# Patient Record
Sex: MALE | Race: WHITE | NOT HISPANIC OR LATINO | Employment: OTHER | URBAN - METROPOLITAN AREA
[De-identification: names, ages, dates, MRNs, and addresses within clinical notes are randomized per-mention and may not be internally consistent; named-entity substitution may affect disease eponyms.]

---

## 2017-01-17 ENCOUNTER — PATIENT MESSAGE (OUTPATIENT)
Dept: MEDICAL GROUP | Age: 67
End: 2017-01-17

## 2017-01-17 DIAGNOSIS — E66.9 OBESITY (BMI 35.0-39.9 WITHOUT COMORBIDITY): ICD-10-CM

## 2017-01-17 DIAGNOSIS — Z86.69 HISTORY OF OBSTRUCTIVE SLEEP APNEA: ICD-10-CM

## 2017-01-17 DIAGNOSIS — N40.1 BENIGN NODULAR PROSTATIC HYPERPLASIA WITH LOWER URINARY TRACT SYMPTOMS: ICD-10-CM

## 2017-01-17 DIAGNOSIS — E78.2 MIXED HYPERLIPIDEMIA: ICD-10-CM

## 2017-01-17 DIAGNOSIS — E61.1 IRON DEFICIENCY: ICD-10-CM

## 2017-01-17 DIAGNOSIS — E53.8 VITAMIN B 12 DEFICIENCY: ICD-10-CM

## 2017-01-17 NOTE — TELEPHONE ENCOUNTER
From: Jhony Rodriguez  To: Reinaldo Fenton M.D.  Sent: 1/17/2017 10:39 AM PST  Subject: Non-Urgent Medical Question    Hello Dr. Fenton,   I am still in the process of securing Long Term Care Insurance, and I am close to approval, but I would like your assistance concerning some final issues that I think are easily resolvable. I will leave an example letter for your review that I would like to have in my records file to allow completion of my LTC insurance. If you would like to discuss this with me, I will make an appointment. Thank you for your assistance. home number - [phone]. Jhony Rodriguez (Pete)

## 2017-01-18 ENCOUNTER — TELEPHONE (OUTPATIENT)
Dept: MEDICAL GROUP | Age: 67
End: 2017-01-18

## 2017-01-18 NOTE — TELEPHONE ENCOUNTER
----- Message from Hiral Jacob sent at 1/17/2017  2:19 PM PST -----  Pt came by to drop off a example letter that he needs Tameka Jennings to write on pt behalf. Patient ask if tameka Jennings can email said letter to him at [email or web address], or pt can  letter,also pt says if needs an appointment he is glad to come in. Please let him know what he needs to do. Put example letter in your basket..  Thank You,  Hiral

## 2017-01-18 NOTE — TELEPHONE ENCOUNTER
Needs appt. In order for me to do letrter. Also, Get and link last colonoscopy report- 2011 at [facility] I believe. Thanks

## 2017-01-18 NOTE — TELEPHONE ENCOUNTER
JORDYN ONLY      Phone Number Called: [phone]    Message: Pt scheduled for tomorrow.  records release sent to [facility].    Left Message for patient to call back: no

## 2017-01-18 NOTE — Clinical Note
[facility]  Reinaldo Fenton M.D.  [address]  [place] [address]  Fax: [phone] Authorization for Release/Disclosure of Protected Health Information   Name: JHONY RODRIGUEZ : 1950 SSN: [id number]   Address: [address] [place] [address] Phone:    [phone] (home) [phone] (work)   I authorize the entity listed below to release/disclose the PHI below to [facility]/Reinaldo Fenton M.D.   Provider or Entity Name:    [facility]   Address   City, State, [facility]   Phone:      Fax:  [phone]     Reason for request: continuity of care   Information to be released:    [x  ] LAST COLONOSCOPY, including any PATH REPORT [  ] LAST DEXA  [  ] LAST MAMMOGRAM  [  ] LAST PAP [  ] RETINA EXAM REPORT  [  ] IMMUNIZATION RECORDS  [  ] Release all info      [  ] Check here and initial the line next to each item to release ALL health information INCLUDING  _____ Care and treatment for drug and / or alcohol abuse  _____ HIV testing, infection status, or AIDS  _____ Genetic Testing    DATES OF SERVICE OR TIME PERIOD TO BE DISCLOSED: _____________  I understand and acknowledge that:  * This Authorization may be revoked at any time by you in writing, except if your health information has already been used or disclosed.  * Your health information that will be used or disclosed as a result of you signing this authorization could be re-disclosed by the recipient. If this occurs, your re-disclosed health information may no longer be protected by State or Federal laws.  * You may refuse to sign this Authorization. Your refusal will not affect your ability to obtain treatment.  * This Authorization becomes effective upon signing and will  on (date) __________. If no date is indicated, this Authorization will  one (1) year from the signature date.    Name: Jhony Rodriguez    Signature: ELECTRONICALLY SIGNED     Date: 2017

## 2017-01-18 NOTE — TELEPHONE ENCOUNTER
Please check chart every day and let me know when colonoscopy show up in [facility]- CALL [facility] DAILY IF NOT SHOWING UP

## 2017-01-19 ENCOUNTER — OFFICE VISIT (OUTPATIENT)
Dept: MEDICAL GROUP | Age: 67
End: 2017-01-19
Payer: COMMERCIAL

## 2017-01-19 VITALS
OXYGEN SATURATION: 99 % | SYSTOLIC BLOOD PRESSURE: 120 MMHG | HEART RATE: 66 BPM | TEMPERATURE: 98 F | HEIGHT: 77 IN | BODY MASS INDEX: 37.19 KG/M2 | DIASTOLIC BLOOD PRESSURE: 78 MMHG | WEIGHT: 315 LBS | RESPIRATION RATE: 16 BRPM

## 2017-01-19 DIAGNOSIS — E78.2 MIXED HYPERLIPIDEMIA: ICD-10-CM

## 2017-01-19 DIAGNOSIS — E61.1 IRON DEFICIENCY: ICD-10-CM

## 2017-01-19 DIAGNOSIS — Z98.84 GASTRIC BYPASS STATUS FOR OBESITY: ICD-10-CM

## 2017-01-19 DIAGNOSIS — G45.4 AMNESIA, GLOBAL, TRANSIENT: ICD-10-CM

## 2017-01-19 DIAGNOSIS — Z86.69 HISTORY OF OBSTRUCTIVE SLEEP APNEA: ICD-10-CM

## 2017-01-19 DIAGNOSIS — E53.8 VITAMIN B 12 DEFICIENCY: ICD-10-CM

## 2017-01-19 DIAGNOSIS — M15.9 PRIMARY OSTEOARTHRITIS INVOLVING MULTIPLE JOINTS: ICD-10-CM

## 2017-01-19 DIAGNOSIS — N40.1 BENIGN NODULAR PROSTATIC HYPERPLASIA WITH LOWER URINARY TRACT SYMPTOMS: ICD-10-CM

## 2017-01-19 DIAGNOSIS — Z86.79 S/P RF ABLATION OPERATION FOR ARRHYTHMIA: ICD-10-CM

## 2017-01-19 DIAGNOSIS — Z98.890 S/P RF ABLATION OPERATION FOR ARRHYTHMIA: ICD-10-CM

## 2017-01-19 DIAGNOSIS — H83.3X1: ICD-10-CM

## 2017-01-19 PROCEDURE — 99215 OFFICE O/P EST HI 40 MIN: CPT | Performed by: INTERNAL MEDICINE

## 2017-01-19 NOTE — Clinical Note
January 20, 2017        Jhony Rodriguez  [address] [place] [address]        Dear Jhony  and underwriting Department at [facility], regarding application number [phone]:    This patient requests a letter on his behalf documenting the resolution of following past medical problems:    Past medical problems which have resolved are as follows:    #1. history of atrial fibrillation in 2008 which resolved with radiofrequency ablation with no recurrence since then. He has had repeat EKGs which have remained normal and he has been discharged from the cardiologist's care for the past several years. He's had no recurrences. Anticipate no further problems from this. He denied any symptoms referable to a cardiac arrhythmia such as syncope or palpitations or chest pains.    #2. History of transient global amnesia in 2012, which resolved spontaneously in 3 days without any recurrence.  As previously documented patient had an extensive neurological workup with neurology consultation and negative or normal CT scanning of the head, EEG, carotid ultrasound, and echocardiogram. He has remained stable since that time and under my care without any recurrence of cognitive or memory impairment syncope. See his paresis or other neurological symptoms. This problem has remained completely resolved since a 3 day episode of 2012, and it is not anticipated that he should never have another recurrence.    In addition is certainly possible that this was merely a drug reaction to the interaction of meloxicam and Celebrex which he just started prior to the onset of the 2012 episode. Nevertheless there is no evidence of any seizure disorder or cerebrovascular disease or other neurological illness that may pose a problem in the future.    #3. History of obstructive sleep apnea, 2012-resolved with extensive weight loss.  The patient has not required any CPAP usage since then and does not suffer from snoring or excessive daytime sleepiness  hypertension or any other symptoms referable to sleep apnea. He continues to do well and the diagnosis of obstructive sleep apnea has resolved completely.           I you have any further questions please do not hesitate to call      Sincerely,        Reinaldo Fenton M.D.    Electronically Signed

## 2017-01-19 NOTE — MR AVS SNAPSHOT
"        Jhony Rodriguez   2017 11:40 AM   Office Visit   MRN: [id number]    Department:  [address] [place] [address]   Dept Phone:  [phone]    Description:  Male : 1950   Provider:  Reinaldo Fenton M.D.           Reason for Visit     Letter for School/Work letter needed for long term health care      Allergies as of 2017     Allergen Noted Reactions    Meloxicam 2013   Rash    alterred mentation    Penicillins 2007   Rash    Rash  Tolerated cefazolin 5/5/15      You were diagnosed with     Gastric bypass status for obesity   [333238]       Iron deficiency   [895184]       Benign nodular prostatic hyperplasia with lower urinary tract symptoms   [1409000]       Hearing loss d/t noise, right   [2731015]       S/P colonoscopy   [852045]       S/P RF ablation operation for arrhythmia   [355230]       BMI 37.0-37.9, adult   [1246653]       Primary osteoarthritis involving multiple joints   [1399766]       Mixed hyperlipidemia   [272.2.ICD-9-CM]       Vitamin B 12 deficiency   [833798]       History of obstructive sleep apnea   [2369224]       Amnesia, global, transient   [737003]         Vital Signs     Blood Pressure Pulse Temperature Respirations Height Weight    120/78 mmHg 66 36.7 °C (98 °F) 16 1.956 m (6' 5.01\") 142.883 kg (315 lb)    Body Mass Index Oxygen Saturation Smoking Status             37.35 kg/m2 99% Former Smoker         Basic Information     Date Of Birth Sex Race Ethnicity Preferred Language    1950 Male White Non- English      Problem List              ICD-10-CM Priority Class Noted - Resolved    S/P RF ablation operation for arrhythmia-; no recurrence; no meds Z98.890, Z86.79   12/10/2010 - Present    BMI 37.0-37.9, adult Z68.37   10/29/2012 - Present    DJD (degenerative joint disease)i- bilateral knees- s/p bilateral THR dr ash,  M19.90   10/29/2012 - Present    Gastric bypass status for obesity- dr gallo Z98.84   2012 - Present    HLD " (hyperlipidemia)- no meds E78.5   6/17/2016 - Present    Vitamin B 12 deficiency- due to gastric bypass E53.8   6/20/2016 - Present    History of obstructive sleep apnea- resolved with weight loss G47.33   6/20/2016 - Present    Iron deficiency- due to gastric bypass E61.1   1/19/2017 - Present    Benign nodular prostatic hyperplasia with lower urinary tract symptoms- controlled with prn tamsulosin 2-3x/wk N40.1   1/19/2017 - Present    S/P colonoscopy- 9/12/2008 at [facility] Z98.890   1/19/2017 - Present    Amnesia, global, transient- 2008, resolved; no recurrence; possibly due to due to medication interaction- celebrex and mobic   G45.4   1/19/2017 - Present      Health Maintenance        Date Due Completion Dates    IMM DTaP/Tdap/Td Vaccine (1 - Tdap) 7/19/1971 ---    IMM ZOSTER VACCINE 7/19/2012 ---    IMM INFLUENZA (1) 9/1/2016 11/19/2012    COLONOSCOPY 7/7/2023 7/7/2013 (N/S)    Override on 7/7/2013: (N/S)            Current Immunizations     Influenza TIV (IM) 11/19/2012      Below and/or attached are the medications your provider expects you to take. Review all of your home medications and newly ordered medications with your provider and/or pharmacist. Follow medication instructions as directed by your provider and/or pharmacist. Please keep your medication list with you and share with your provider. Update the information when medications are discontinued, doses are changed, or new medications (including over-the-counter products) are added; and carry medication information at all times in the event of emergency situations     Allergies:  MELOXICAM - Rash     PENICILLINS - Rash               Medications  Valid as of: January 19, 2017 -  1:23 PM    Generic Name Brand Name Tablet Size Instructions for use    Aspirin (Tablet Delayed Response) Aspirin 325 MG Take 1 Tab by mouth 2 Times a Day.        Cholecalciferol (Cap) Cholecalciferol 1000 UNIT Take 1,000 Units by mouth every day.        Cyanocobalamin (Tab)  vitamin B-12 1000 MCG Take 1 Tab by mouth every day.        Ferrous Sulfate Dried (Tab CR) Ferrous Sulfate Dried 45 MG Take 45 mg by mouth every day.        Naproxen Sodium   Take  by mouth.        Tamsulosin HCl (Cap) FLOMAX 0.4 MG Take 1 Cap by mouth ONE-HALF HOUR AFTER BREAKFAST.        .                 Medicines prescribed today were sent to:     [id number] - [place], [place] - [address]    [address] [place] [address]    Phone: [phone] Fax: [phone]    Open 24 Hours?: No      Medication refill instructions:       If your prescription bottle indicates you have medication refills left, it is not necessary to call your provider’s office. Please contact your pharmacy and they will refill your medication.    If your prescription bottle indicates you do not have any refills left, you may request refills at any time through one of the following ways: The online [employer] system (except Urgent Care), by calling your provider’s office, or by asking your pharmacy to contact your provider’s office with a refill request. Medication refills are processed only during regular business hours and may not be available until the next business day. Your provider may request additional information or to have a follow-up visit with you prior to refilling your medication.   *Please Note: Medication refills are assigned a new Rx number when refilled electronically. Your pharmacy may indicate that no refills were authorized even though a new prescription for the same medication is available at the pharmacy. Please request the medicine by name with the pharmacy before contacting your provider for a refill.        Referral     A referral request has been sent to our patient care coordination department. Please allow 3-5 business days for us to process this request and contact you either by phone or mail. If you do not hear from us by the 5th business day, please call us at [phone].           [employer]Cred  Access Code: Activation code not generated  Current MyChart Status: Active

## 2017-01-20 ASSESSMENT — ENCOUNTER SYMPTOMS
RESPIRATORY NEGATIVE: 1
CONSTITUTIONAL NEGATIVE: 1
GASTROINTESTINAL NEGATIVE: 1
PSYCHIATRIC NEGATIVE: 1
EYES NEGATIVE: 1
MUSCULOSKELETAL NEGATIVE: 1
CARDIOVASCULAR NEGATIVE: 1
NEUROLOGICAL NEGATIVE: 1

## 2017-01-21 NOTE — PROGRESS NOTES
Subjective:      Jhony Rodriguez is a 66 y.o. male who presents with Letter for School/Work   patient's here for discussion of his past medical problems that may present a difficulty for him in obtaining long-term health care insurance, and for me to provide a letter on his behalf for his insurance company documenting stability and/or resolution of these multiple problems.    Past medical problems which have resolved are as follows:    #1. history of atrial fibrillation in 2008 which resolved with radiofrequency ablation with no recurrence since then. He has had repeat EKGs which have remained normal and he has been discharged from the cardiologist's care for the past several years. He's had no recurrences. Anticipate no further problems from this. He denied any symptoms referable to a cardiac arrhythmia such as syncope or palpitations or chest pains.    #2. History of transient global amnesia in 2012, which resolved spontaneously in 3 days without any recurrence.  As previously documented patient had an extensive neurological workup with neurology consultation and negative or normal CT scanning of the head, EEG, carotid ultrasound, and echocardiogram. He has remained stable since that time and under my care without any recurrence of cognitive or memory impairment syncope. See his paresis or other neurological symptoms. This problem has remained completely resolved since a 3 day episode of 2012, and it is not anticipated that he should never have another recurrence.    In addition is certainly possible that this was merely a drug reaction to the interaction of meloxicam and Celebrex which he just started prior to the onset of the 2012 episode. Nevertheless there is no evidence of any seizure disorder or cerebrovascular disease or other neurological illness that may pose a problem in the future.    #3. History of obstructive sleep apnea, 2012-resolved with extensive weight loss.  The patient has not required any  CPAP usage since then and does not suffer from snoring or excessive daytime sleepiness hypertension or any other symptoms referable to sleep apnea. He continues to do well and the diagnosis of obstructive sleep apnea has resolved completely.    The patient's also here for follow-up of other chronic medical problems which include obesity, degenerative joint disease status post bilateral total knee arthroplasties, dyslipidemia treated with diet neck she has size and no medications, and B-12 and iron deficiencies secondary to his gastric bypass in 2005 . Also for follow-up of his BPH controlled with tamsulosin.    Since last visit patient has done well with no new complaints, other than mild hearing loss, over the last several months. No headaches or ear pain. Would like further evaluation with hearing evaluation and audiometry. Denies any chest pain PND or orthopnea or myalgias and only has occasional arthritic pains of his knees which responded well to Aleve.    Allergies   Allergen Reactions   • Meloxicam Rash     alterred mentation   • Penicillins Rash     Rash  Tolerated cefazolin 5/5/15     Outpatient Prescriptions Prior to Visit   Medication Sig Dispense Refill   • Aspirin 325 MG TBEC Take 1 Tab by mouth 2 Times a Day. 30 Tab 0   • Ferrous Sulfate Dried (SLOW RELEASE IRON) 45 MG TBCR Take 45 mg by mouth every day. 111 Tab 11   • Cyanocobalamin (VITAMIN B-12) 1000 MCG TABS Take 1 Tab by mouth every day. 30 Each 11   • Cholecalciferol (VITAMIN D3) 1000 UNIT CAPS Take 1,000 Units by mouth every day.     • tamsulosin (FLOMAX) 0.4 MG capsule Take 1 Cap by mouth ONE-HALF HOUR AFTER BREAKFAST. 90 Cap 4   • celecoxib (CELEBREX) 200 MG CAPS Take 1 Cap by mouth every day. 30 Cap 6     No facility-administered medications prior to visit.              HPI    Review of Systems   Constitutional: Negative.    HENT: Negative.    Eyes: Negative.    Respiratory: Negative.    Cardiovascular: Negative.    Gastrointestinal:  "Negative.    Genitourinary: Negative.    Musculoskeletal: Negative.    Skin: Negative.    Neurological: Negative.    Endo/Heme/Allergies: Negative.    Psychiatric/Behavioral: Negative.           Objective:     /78 mmHg  Pulse 66  Temp(Src) 36.7 °C (98 °F)  Resp 16  Ht 1.956 m (6' 5.01\")  Wt 142.883 kg (315 lb)  BMI 37.35 kg/m2  SpO2 99%     Physical Exam   Constitutional: He is oriented to person, place, and time. He appears well-developed and well-nourished. No distress.   HENT:   Head: Normocephalic and atraumatic.   Right Ear: External ear normal.   Left Ear: External ear normal.   Mouth/Throat: Oropharynx is clear and moist. No oropharyngeal exudate.   Eyes: Conjunctivae and EOM are normal. Pupils are equal, round, and reactive to light. Right eye exhibits no discharge.   Neck: Normal range of motion. Neck supple. No JVD present. No tracheal deviation present. No thyromegaly present.   Cardiovascular: Normal rate, regular rhythm, normal heart sounds and intact distal pulses.  Exam reveals no gallop.    Pulmonary/Chest: Effort normal and breath sounds normal. No respiratory distress. He has no wheezes. He has no rales. He exhibits no tenderness.   Abdominal: Soft. Bowel sounds are normal. He exhibits no distension and no mass. There is no tenderness. There is no rebound and no guarding. No hernia.   Genitourinary: Guaiac negative stool. No penile tenderness.   Musculoskeletal: He exhibits no edema or tenderness.   Lymphadenopathy:     He has no cervical adenopathy.   Neurological: He is alert and oriented to person, place, and time. He has normal reflexes. He displays normal reflexes. No cranial nerve deficit. He exhibits normal muscle tone. Coordination normal.   Skin: Skin is warm and dry. No rash noted. He is not diaphoretic. No erythema. No pallor.   Psychiatric: He has a normal mood and affect. His behavior is normal. Judgment and thought content normal.   Nursing note and vitals reviewed.  No " visits with results within 1 Month(s) from this visit.  Latest known visit with results is:    Orders Only on 06/22/2016   Component Date Value   • Occult Blood, IA 06/22/2016 Negative       No results found for: HBA1C  Lab Results   Component Value Date/Time    SODIUM 141 06/21/2016 09:57 AM    POTASSIUM 4.8 06/21/2016 09:57 AM    CHLORIDE 102 06/21/2016 09:57 AM    CO2 23 06/21/2016 09:57 AM    GLUCOSE 91 06/21/2016 09:57 AM    BUN 22 06/21/2016 09:57 AM    CREATININE 0.70* 06/21/2016 09:57 AM    BUN-CREATININE RATIO 31* 06/21/2016 09:57 AM    GLOM FILT RATE, EST >59 12/06/2010 08:03 AM    ALKALINE PHOSPHATASE 84 06/21/2016 09:57 AM    AST(SGOT) 17 06/21/2016 09:57 AM    ALT(SGPT) 14 06/21/2016 09:57 AM    TOTAL BILIRUBIN 0.5 06/21/2016 09:57 AM     Lab Results   Component Value Date/Time    INR 1.03 10/21/2013 12:12 PM    INR 1.51* 06/12/2008 06:30 AM     Lab Results   Component Value Date/Time    CHOLESTEROL, 06/21/2016 09:57 AM    * 06/21/2016 09:57 AM    HDL 53 06/21/2016 09:57 AM    TRIGLYCERIDES 81 06/21/2016 09:57 AM       No results found for: TESTOSTERONE  Lab Results   Component Value Date/Time    TSH 2.700 06/21/2016 09:57 AM    TSH 3.190 07/11/2013 07:33 AM     Lab Results   Component Value Date/Time    FREE T-4 0.81 07/07/2014 09:54 AM    FREE T-4 0.89 10/21/2013 12:12 PM     No results found for: URICACID  No components found for: VITB12  Lab Results   Component Value Date/Time    25-HYDROXY   VITAMIN D 25 23* 07/07/2014 09:54 AM    25-HYDROXY   VITAMIN D 25 24.5* 07/11/2013 07:33 AM                   Assessment/Plan:     1. Gastric bypass status for obesity-2005 dr gallo  Under good control. Continue same regimen.        2.  Mixed hyperlipidemia-mild, no meds, diet and exercise controlledUnder good control. Continue same regimen.        3. Benign nodular prostatic hyperplasia with lower urinary tract symptoms- controlled with prn tamsulosin 2-3x/wk   Under good control. Continue same  regimen.    4. Iron deficiency- due to gastric bypassUnder good control. Continue same regimen.       5. Vitamin B 12 deficiency- due to gastric bypassUnder good control. Continue same regimen.        6. BMI 37.0-37.9, adultUnder good control. Continue same regimen.       7. Primary osteoarthritis involving multiple jointsUnder good control. Continue same regimen.     - Naproxen Sodium (ALEVE PO); Take  by mouth.    8. Hearing loss d/t noise, right- NEW PROB - REF TO ENT     - REFERRAL TO ENT    9. Amnesia, global, transient- HISTORY OF, 2008, resolved; no recurrence; possibly due to due to medication interaction- celebrex and mobic       10. S/P RF ablation operation for arrhythmia-2008; no recurrence; no meds       11. History of obstructive sleep apnea- resolved with weight loss              40 minute face-to-face encounter took place today.  More than half of this time was spent in the coordination of care of the above problems, as well as counseling.       Letter dictated to [employer] in his insurance company as requested.

## 2017-02-17 ENCOUNTER — TELEPHONE (OUTPATIENT)
Dept: MEDICAL GROUP | Age: 67
End: 2017-02-17

## 2017-02-17 NOTE — TELEPHONE ENCOUNTER
Staci Veras-  called [phone]. Requesting  Med records from patient. Referred to med records dep.

## 2017-02-22 PROBLEM — E66.9 OBESITY (BMI 35.0-39.9 WITHOUT COMORBIDITY): Status: ACTIVE | Noted: 2017-02-22

## 2017-02-22 NOTE — TELEPHONE ENCOUNTER
1. Caller Name: Jhony                                         Call Back Number: [phone] (home)      Patient approves a detailed voicemail message: no    Patient states he doesn't feel it is necessary to have an office visit because this issue has already been discussed.  Patient states he just recently seen Dr. Fenton and there is nothing more needed.

## 2017-03-10 ENCOUNTER — PATIENT MESSAGE (OUTPATIENT)
Dept: MEDICAL GROUP | Age: 67
End: 2017-03-10

## 2017-03-14 ENCOUNTER — OFFICE VISIT (OUTPATIENT)
Dept: MEDICAL GROUP | Age: 67
End: 2017-03-14
Payer: COMMERCIAL

## 2017-03-14 VITALS
HEIGHT: 77 IN | DIASTOLIC BLOOD PRESSURE: 68 MMHG | BODY MASS INDEX: 36.84 KG/M2 | SYSTOLIC BLOOD PRESSURE: 122 MMHG | WEIGHT: 312 LBS | OXYGEN SATURATION: 95 % | TEMPERATURE: 98.1 F | HEART RATE: 77 BPM

## 2017-03-14 DIAGNOSIS — M62.08 DIASTASIS OF RECTUS ABDOMINIS: ICD-10-CM

## 2017-03-14 DIAGNOSIS — Z23 NEED FOR SHINGLES VACCINE: ICD-10-CM

## 2017-03-14 DIAGNOSIS — Z12.5 SCREENING FOR PROSTATE CANCER: ICD-10-CM

## 2017-03-14 DIAGNOSIS — E61.1 IRON DEFICIENCY: ICD-10-CM

## 2017-03-14 DIAGNOSIS — Z23 NEED FOR TDAP VACCINATION: ICD-10-CM

## 2017-03-14 DIAGNOSIS — E78.2 MIXED HYPERLIPIDEMIA: ICD-10-CM

## 2017-03-14 DIAGNOSIS — E66.9 OBESITY (BMI 30-39.9): ICD-10-CM

## 2017-03-14 DIAGNOSIS — E53.8 VITAMIN B 12 DEFICIENCY: ICD-10-CM

## 2017-03-14 DIAGNOSIS — N40.1 BENIGN NODULAR PROSTATIC HYPERPLASIA WITH LOWER URINARY TRACT SYMPTOMS: ICD-10-CM

## 2017-03-14 DIAGNOSIS — E66.9 OBESITY (BMI 35.0-39.9 WITHOUT COMORBIDITY): ICD-10-CM

## 2017-03-14 PROCEDURE — 90471 IMMUNIZATION ADMIN: CPT | Performed by: INTERNAL MEDICINE

## 2017-03-14 PROCEDURE — 90715 TDAP VACCINE 7 YRS/> IM: CPT | Performed by: INTERNAL MEDICINE

## 2017-03-14 PROCEDURE — 99214 OFFICE O/P EST MOD 30 MIN: CPT | Mod: 25 | Performed by: INTERNAL MEDICINE

## 2017-03-14 ASSESSMENT — ENCOUNTER SYMPTOMS
CARDIOVASCULAR NEGATIVE: 1
CONSTITUTIONAL NEGATIVE: 1
RESPIRATORY NEGATIVE: 1
NEUROLOGICAL NEGATIVE: 1
GASTROINTESTINAL NEGATIVE: 1
EYES NEGATIVE: 1
PSYCHIATRIC NEGATIVE: 1
MUSCULOSKELETAL NEGATIVE: 1

## 2017-03-14 ASSESSMENT — PAIN SCALES - GENERAL: PAINLEVEL: NO PAIN

## 2017-03-14 NOTE — TELEPHONE ENCOUNTER
From: Jhony Rodriguez  To: Reinaldo Fenton M.D.  Sent: 3/10/2017 4:35 PM PST  Subject: Procedure Question    I have scheduled an appointment with Dr. Reinaldo Fenton on Tuesday, March 14 @3:40 to examine and advise possible hernia below sternum. While I am at the office, could I also receive the Tetanus and Shingles shots? I had the Flu shot 4 months ago at a [facility] in [place]. Thank you in advance for your response.

## 2017-03-14 NOTE — MR AVS SNAPSHOT
"Jhony Rodriguez   3/14/2017 3:40 PM   Office Visit   MRN: [id number]    Department:  [address] [place] [address]   Dept Phone:  [phone]    Description:  Male : 1950   Provider:  Reinaldo Fenton M.D.           Reason for Visit     Hernia poss hernia      Allergies as of 3/14/2017     Allergen Noted Reactions    Meloxicam 2013   Rash    alterred mentation    Penicillins 2007   Rash    Rash  Tolerated cefazolin 5/5/15      You were diagnosed with     Diastasis of rectus abdominis   [3423270]       Obesity (BMI 35.0-39.9 without comorbidity) (HCC)   [907985]       Mixed hyperlipidemia   [272.2.ICD-9-CM]       Vitamin B 12 deficiency   [815975]       Iron deficiency   [738483]       Benign nodular prostatic hyperplasia with lower urinary tract symptoms   [3986080]       Need for shingles vaccine   [074606]       Need for Tdap vaccination   [515803]       Screening for prostate cancer   [997332]         Vital Signs     Blood Pressure Pulse Temperature Height Weight Body Mass Index    122/68 mmHg 77 36.7 °C (98.1 °F) 1.956 m (6' 5\") 141.522 kg (312 lb) 36.99 kg/m2    Oxygen Saturation Smoking Status                95% Former Smoker          Basic Information     Date Of Birth Sex Race Ethnicity Preferred Language    1950 Male White Non- English      Your appointments     Sep 14, 2017  9:40 AM   Established Patient with Reinaldo Fenton M.D.   [address] [id number] [phone]           You will be receiving a confirmation call a few days before your appointment from our automated call confirmation system.              Problem List              ICD-10-CM Priority Class Noted - Resolved    S/P RF ablation operation for arrhythmia-; no recurrence; no meds Z98.890, Z86.79   12/10/2010 - Present    BMI 37.0-37.9, adult Z68.37   10/29/2012 - Present    DJD (degenerative joint disease)i- bilateral knees- s/p bilateral " TKR dr ash, 2015 M19.90   10/29/2012 - Present    Gastric bypass status for obesity-2005 dr gallo Z98.84   11/19/2012 - Present     Mixed hyperlipidemia-mild, no meds, diet and exercise controlled E78.2   6/17/2016 - Present    Vitamin B 12 deficiency- due to gastric bypass E53.8   6/20/2016 - Present    History of obstructive sleep apnea- resolved with weight loss G47.33   6/20/2016 - Present    Iron deficiency- due to gastric bypass E61.1   1/19/2017 - Present    Benign nodular prostatic hyperplasia with lower urinary tract symptoms- controlled with prn tamsulosin 2-3x/wk N40.1   1/19/2017 - Present    S/P colonoscopy- 9/12/2008 at [facility] Z98.890   1/19/2017 - Present    Amnesia, global, transient- 2008, resolved; no recurrence; possibly due to due to medication interaction- celebrex and mobic   G45.4   1/19/2017 - Present    Obesity (BMI 35.0-39.9 without comorbidity) ([facility]) E66.9   2/22/2017 - Present      Health Maintenance        Date Due Completion Dates    IMM ZOSTER VACCINE 7/19/2012 ---    IMM INFLUENZA (1) 9/1/2016 11/19/2012    COLONOSCOPY 9/12/2018 9/12/2008    IMM DTaP/Tdap/Td Vaccine (2 - Td) 3/14/2027 3/14/2017            Current Immunizations     Influenza TIV (IM) 11/19/2012    Tdap Vaccine 3/14/2017      Below and/or attached are the medications your provider expects you to take. Review all of your home medications and newly ordered medications with your provider and/or pharmacist. Follow medication instructions as directed by your provider and/or pharmacist. Please keep your medication list with you and share with your provider. Update the information when medications are discontinued, doses are changed, or new medications (including over-the-counter products) are added; and carry medication information at all times in the event of emergency situations     Allergies:  MELOXICAM - Rash     PENICILLINS - Rash               Medications  Valid as of: March 14, 2017 -  4:15 PM    Generic Name Brand  Name Tablet Size Instructions for use    Aspirin (Tablet Delayed Response) Aspirin 325 MG Take 1 Tab by mouth 2 Times a Day.        Cholecalciferol (Cap) Cholecalciferol 1000 UNIT Take 1,000 Units by mouth every day.        Cyanocobalamin (Tab) vitamin B-12 1000 MCG Take 1 Tab by mouth every day.        Ferrous Sulfate Dried (Tab CR) Ferrous Sulfate Dried 45 MG Take 45 mg by mouth every day.        Naproxen Sodium   Take  by mouth.        NON SPECIFIED   Shingles vaccine        Tamsulosin HCl (Cap) FLOMAX 0.4 MG Take 1 Cap by mouth ONE-HALF HOUR AFTER BREAKFAST.        .                 Medicines prescribed today were sent to:     [id number] - [place], [place] - [address]    [address] [place] [address]    Phone: [phone] Fax: [phone]    Open 24 Hours?: No      Medication refill instructions:       If your prescription bottle indicates you have medication refills left, it is not necessary to call your provider’s office. Please contact your pharmacy and they will refill your medication.    If your prescription bottle indicates you do not have any refills left, you may request refills at any time through one of the following ways: The online [employer] system (except Urgent Care), by calling your provider’s office, or by asking your pharmacy to contact your provider’s office with a refill request. Medication refills are processed only during regular business hours and may not be available until the next business day. Your provider may request additional information or to have a follow-up visit with you prior to refilling your medication.   *Please Note: Medication refills are assigned a new Rx number when refilled electronically. Your pharmacy may indicate that no refills were authorized even though a new prescription for the same medication is available at the pharmacy. Please request the medicine by name with the pharmacy before contacting your provider for a refill.        Your To Do List       Future Labs/Procedures Complete By Expires    CBC WITH DIFFERENTIAL  As directed 3/14/2018    COMP METABOLIC PANEL  As directed 3/14/2018    LIPID PROFILE  As directed 3/14/2018    PROSTATE SPECIFIC AG SCREENING  As directed 3/14/2018    TSH  As directed 3/14/2018         MyChart Access Code: Activation code not generated  Current MyChart Status: Active

## 2017-03-15 NOTE — PROGRESS NOTES
Subjective:      Jhony Rodriguez is a 66 y.o. male who presents with Hernia  The patient is here for followup of chronic medical problems listed below. The patient is compliant with medications and having no side effects from them. Denies chest pain, abdominal pain, dyspnea, myalgias, or cough.   Patient Active Problem List    Diagnosis Date Noted   • Obesity (BMI 30-39.9) 03/14/2017   • Obesity (BMI 35.0-39.9 without comorbidity) (HCC) 02/22/2017   • Iron deficiency- due to gastric bypass 01/19/2017   • Benign nodular prostatic hyperplasia with lower urinary tract symptoms- controlled with prn tamsulosin 2-3x/wk 01/19/2017   • S/P colonoscopy- 9/12/2008 at [facility] 01/19/2017   • Amnesia, global, transient- 2008, resolved; no recurrence; possibly due to due to medication interaction- celebrex and mobic   01/19/2017   • Vitamin B 12 deficiency- due to gastric bypass 06/20/2016   • History of obstructive sleep apnea- resolved with weight loss 06/20/2016   •  Mixed hyperlipidemia-mild, no meds, diet and exercise controlled 06/17/2016   • Gastric bypass status for obesity-2005 dr gallo 11/19/2012   • BMI 37.0-37.9, adult 10/29/2012   • DJD (degenerative joint disease)i- bilateral knees- s/p bilateral TKR dr ash, 2015 10/29/2012   • S/P RF ablation operation for arrhythmia-2008; no recurrence; no meds 12/10/2010     Allergies   Allergen Reactions   • Meloxicam Rash     alterred mentation   • Penicillins Rash     Rash  Tolerated cefazolin 5/5/15     Outpatient Prescriptions Prior to Visit   Medication Sig Dispense Refill   • Naproxen Sodium (ALEVE PO) Take  by mouth.     • Aspirin 325 MG TBEC Take 1 Tab by mouth 2 Times a Day. 30 Tab 0   • Ferrous Sulfate Dried (SLOW RELEASE IRON) 45 MG TBCR Take 45 mg by mouth every day. 111 Tab 11   • Cyanocobalamin (VITAMIN B-12) 1000 MCG TABS Take 1 Tab by mouth every day. 30 Each 11   • Cholecalciferol (VITAMIN D3) 1000 UNIT CAPS Take 1,000 Units by mouth every day.     • tamsulosin  "(FLOMAX) 0.4 MG capsule Take 1 Cap by mouth ONE-HALF HOUR AFTER BREAKFAST. 90 Cap 4     No facility-administered medications prior to visit.               HPI    Review of Systems   Constitutional: Negative.    HENT: Negative.    Eyes: Negative.    Respiratory: Negative.    Cardiovascular: Negative.    Gastrointestinal: Negative.    Genitourinary: Negative.    Musculoskeletal: Negative.    Skin: Negative.    Neurological: Negative.    Endo/Heme/Allergies: Negative.    Psychiatric/Behavioral: Negative.           Objective:     /68 mmHg  Pulse 77  Temp(Src) 36.7 °C (98.1 °F)  Ht 1.956 m (6' 5\")  Wt 141.522 kg (312 lb)  BMI 36.99 kg/m2  SpO2 95%     Physical Exam   Constitutional: He is oriented to person, place, and time. He appears well-developed and well-nourished. No distress.   HENT:   Head: Normocephalic and atraumatic.   Right Ear: External ear normal.   Left Ear: External ear normal.   Mouth/Throat: Oropharynx is clear and moist. No oropharyngeal exudate.   Eyes: Conjunctivae and EOM are normal. Pupils are equal, round, and reactive to light. Right eye exhibits no discharge.   Neck: Normal range of motion. Neck supple. No JVD present. No tracheal deviation present. No thyromegaly present.   Cardiovascular: Normal rate, regular rhythm, normal heart sounds and intact distal pulses.  Exam reveals no gallop.    Pulmonary/Chest: Effort normal and breath sounds normal. No respiratory distress. He has no wheezes. He has no rales. He exhibits no tenderness.   Abdominal: Soft. Bowel sounds are normal. He exhibits no distension and no mass. There is no tenderness. There is no rebound and no guarding. No hernia.   Genitourinary: Guaiac negative stool. No penile tenderness.   Musculoskeletal: He exhibits no edema or tenderness.   Lymphadenopathy:     He has no cervical adenopathy.   Neurological: He is alert and oriented to person, place, and time. He has normal reflexes. He displays normal reflexes. No cranial " nerve deficit. He exhibits normal muscle tone. Coordination normal.   Skin: Skin is warm and dry. No rash noted. He is not diaphoretic. No erythema. No pallor.   Psychiatric: He has a normal mood and affect. His behavior is normal. Judgment and thought content normal.   Nursing note and vitals reviewed.  No visits with results within 1 Month(s) from this visit.  Latest known visit with results is:    Orders Only on 06/22/2016   Component Date Value   • Occult Blood, IA 06/22/2016 Negative       No results found for: HBA1C  Lab Results   Component Value Date/Time    SODIUM 141 06/21/2016 09:57 AM    POTASSIUM 4.8 06/21/2016 09:57 AM    CHLORIDE 102 06/21/2016 09:57 AM    CO2 23 06/21/2016 09:57 AM    GLUCOSE 91 06/21/2016 09:57 AM    BUN 22 06/21/2016 09:57 AM    CREATININE 0.70* 06/21/2016 09:57 AM    BUN-CREATININE RATIO 31* 06/21/2016 09:57 AM    GLOM FILT RATE, EST >59 12/06/2010 08:03 AM    ALKALINE PHOSPHATASE 84 06/21/2016 09:57 AM    AST(SGOT) 17 06/21/2016 09:57 AM    ALT(SGPT) 14 06/21/2016 09:57 AM    TOTAL BILIRUBIN 0.5 06/21/2016 09:57 AM     Lab Results   Component Value Date/Time    INR 1.03 10/21/2013 12:12 PM    INR 1.51* 06/12/2008 06:30 AM     Lab Results   Component Value Date/Time    CHOLESTEROL, 06/21/2016 09:57 AM    * 06/21/2016 09:57 AM    HDL 53 06/21/2016 09:57 AM    TRIGLYCERIDES 81 06/21/2016 09:57 AM       No results found for: TESTOSTERONE  Lab Results   Component Value Date/Time    TSH 2.700 06/21/2016 09:57 AM    TSH 3.190 07/11/2013 07:33 AM     Lab Results   Component Value Date/Time    FREE T-4 0.81 07/07/2014 09:54 AM    FREE T-4 0.89 10/21/2013 12:12 PM     No results found for: URICACID  No components found for: VITB12  Lab Results   Component Value Date/Time    25-HYDROXY   VITAMIN D 25 23* 07/07/2014 09:54 AM    25-HYDROXY   VITAMIN D 25 24.5* 07/11/2013 07:33 AM                    Assessment/Plan:     1. Diastasis of rectus abdominis- no reducible hernia-  reassurance       2. Obesity (BMI 35.0-39.9 without comorbidity) (HCC)   diet/exercise/lose 15 lbs.; patient counseled       - Patient identified as having weight management issue.  Appropriate orders and counseling given.    3.  Mixed hyperlipidemia-mild, no meds, diet and exercise controlled  Under good control. Continue same regimen.     - TSH; Future  - COMP METABOLIC PANEL; Future  - LIPID PROFILE; Future  - CBC WITH DIFFERENTIAL; Future    4. Vitamin B 12 deficiency- due to gastric bypass  Under good control. Continue same regimen.     5. Iron deficiency- due to gastric bypass  Under good control. Continue same regimen.       Under good control. Continue same regimen.  6. Benign nodular prostatic hyperplasia with lower urinary tract symptoms- controlled with prn tamsulosin 2-3x/wk       7. Need for shingles vaccine     - NON SPECIFIED; Shingles vaccine  Dispense: 1 Each; Refill: 0    8. Need for Tdap vaccination      - TDAP VACCINE =>8YO IM    9. Screening for prostate cancer        - PROSTATE SPECIFIC AG SCREENING; Future    10. Obesity (BMI 30-39.9)   diet/exercise/lose 15 lbs.; patient counseled        - Patient identified as having weight management issue.  Appropriate orders and counseling given.      30 minute face-to-face encounter took place today.  More than half of this time was spent in the coordination of care of the above problems, as well as counseling.

## 2017-08-07 ENCOUNTER — PATIENT MESSAGE (OUTPATIENT)
Dept: MEDICAL GROUP | Age: 67
End: 2017-08-07

## 2017-08-07 DIAGNOSIS — R21 RASH: ICD-10-CM

## 2017-08-08 ENCOUNTER — PATIENT MESSAGE (OUTPATIENT)
Dept: MEDICAL GROUP | Age: 67
End: 2017-08-08

## 2017-08-08 NOTE — TELEPHONE ENCOUNTER
"From: Jhony Rodriguez  To: Reinaldo Fenton M.D.  Sent: 8/7/2017 11:26 AM PDT  Subject: Non-Urgent Medical Question    Dermatologist referral request: The month of March, about one week after I had a DPT tetanus booster shot at the [facility] I had a skin breakout, redness under my chin and cheeks on my face, and on my arms. I scheduled an appointment with a dermatologist, Dr. Lan Garcia, waiting for two months to see him. I had \"dermatitis\", but could not give me a cause (not a fungus), and prescribed hydrocortisone (1%) and a moisturizer. Still have redness and irritation after 4 months. Need opinion  "

## 2017-08-08 NOTE — TELEPHONE ENCOUNTER
You should call Dr. Hagen's office. And explain the situation, that you are not not getting any better and would like a follow-up appointment. I also did a referral to another dermatologist in case he wanted to see a different one

## 2017-09-05 ENCOUNTER — APPOINTMENT (OUTPATIENT)
Dept: SOCIAL WORK | Facility: CLINIC | Age: 67
End: 2017-09-05
Payer: COMMERCIAL

## 2017-09-13 ENCOUNTER — TELEPHONE (OUTPATIENT)
Dept: MEDICAL GROUP | Age: 67
End: 2017-09-13

## 2017-09-13 NOTE — TELEPHONE ENCOUNTER
ESTABLISHED PATIENT PRE-VISIT PLANNING     Note: Patient will not be contacted if there is no indication to call.     1.  Reviewed notes from the last few office visits within the medical group: Yes    2.  If any orders were placed at last visit or intended to be done for this visit (i.e. 6 mos follow-up), do we have Results/Consult Notes?        •  Labs - Labs ordered, NOT completed. Patient advised to complete prior to next appointment. Called [facility] no labs on file. Informed patient to complete prior to next appt or reschedule.       •  Imaging - Imaging was not ordered at last office visit.       •  Referrals - Referral ordered, patient has NOT been seen.    3. Is this appointment scheduled as a Hospital Follow-Up? No    4.  Immunizations were updated in Epic using WebIZ?: Epic matches WebIZ       •  Web [employer] Recommendations: FLU, PREVNAR (PCV13) , TD and ZOSTAVAX (Shingles)    5.  Patient is due for the following Health Maintenance Topics:   Health Maintenance Due   Topic Date Due   • IMM ZOSTER VACCINE  07/19/2012   • IMM PNEUMOCOCCAL 65+ (ADULT) LOW/MEDIUM RISK SERIES (1 of 2 - PCV13) 07/19/2017   • IMM INFLUENZA (1) 09/01/2017       - Patient is up-to-date on all Health Maintenance topics. No records have been requested at this time.    6.  Patient was NOT informed to arrive 15 min prior to their scheduled appointment and bring in their medication bottles.

## 2017-09-14 ENCOUNTER — OFFICE VISIT (OUTPATIENT)
Dept: MEDICAL GROUP | Age: 67
End: 2017-09-14
Payer: MEDICARE

## 2017-09-14 VITALS
HEIGHT: 77 IN | DIASTOLIC BLOOD PRESSURE: 60 MMHG | TEMPERATURE: 98.1 F | BODY MASS INDEX: 36.25 KG/M2 | HEART RATE: 65 BPM | SYSTOLIC BLOOD PRESSURE: 120 MMHG | OXYGEN SATURATION: 95 % | WEIGHT: 307 LBS

## 2017-09-14 DIAGNOSIS — E53.8 VITAMIN B 12 DEFICIENCY: ICD-10-CM

## 2017-09-14 DIAGNOSIS — N40.1 BENIGN NODULAR PROSTATIC HYPERPLASIA WITH LOWER URINARY TRACT SYMPTOMS: ICD-10-CM

## 2017-09-14 DIAGNOSIS — E55.9 VITAMIN D DEFICIENCY: ICD-10-CM

## 2017-09-14 DIAGNOSIS — Z23 NEED FOR VACCINATION FOR PNEUMOCOCCUS: ICD-10-CM

## 2017-09-14 DIAGNOSIS — Z23 NEED FOR SHINGLES VACCINE: ICD-10-CM

## 2017-09-14 DIAGNOSIS — Z79.2 PROPHYLACTIC ANTIBIOTIC: ICD-10-CM

## 2017-09-14 DIAGNOSIS — Z23 NEED FOR 23-POLYVALENT PNEUMOCOCCAL POLYSACCHARIDE VACCINE: ICD-10-CM

## 2017-09-14 DIAGNOSIS — E66.9 OBESITY (BMI 35.0-39.9 WITHOUT COMORBIDITY): ICD-10-CM

## 2017-09-14 DIAGNOSIS — E61.1 IRON DEFICIENCY: ICD-10-CM

## 2017-09-14 DIAGNOSIS — E78.2 MIXED HYPERLIPIDEMIA: ICD-10-CM

## 2017-09-14 DIAGNOSIS — L30.9 ECZEMA, UNSPECIFIED TYPE: ICD-10-CM

## 2017-09-14 PROBLEM — Z98.890 S/P COLONOSCOPY: Status: RESOLVED | Noted: 2017-01-19 | Resolved: 2017-09-14

## 2017-09-14 PROCEDURE — 99214 OFFICE O/P EST MOD 30 MIN: CPT | Mod: 25 | Performed by: INTERNAL MEDICINE

## 2017-09-14 PROCEDURE — 90670 PCV13 VACCINE IM: CPT | Performed by: INTERNAL MEDICINE

## 2017-09-14 PROCEDURE — G0009 ADMIN PNEUMOCOCCAL VACCINE: HCPCS | Performed by: INTERNAL MEDICINE

## 2017-09-14 RX ORDER — CLINDAMYCIN HYDROCHLORIDE 150 MG/1
CAPSULE ORAL
Refills: 0 | COMMUNITY
Start: 2017-08-29 | End: 2018-02-05

## 2017-09-14 RX ORDER — ACETAMINOPHEN 160 MG
TABLET,DISINTEGRATING ORAL
Qty: 100 CAP | Refills: 4 | Status: SHIPPED | OUTPATIENT
Start: 2017-09-14 | End: 2018-02-05

## 2017-09-14 ASSESSMENT — ENCOUNTER SYMPTOMS
CONSTITUTIONAL NEGATIVE: 1
PSYCHIATRIC NEGATIVE: 1
GASTROINTESTINAL NEGATIVE: 1
NEUROLOGICAL NEGATIVE: 1
CARDIOVASCULAR NEGATIVE: 1
EYES NEGATIVE: 1
RESPIRATORY NEGATIVE: 1
MUSCULOSKELETAL NEGATIVE: 1

## 2017-09-14 ASSESSMENT — PATIENT HEALTH QUESTIONNAIRE - PHQ9: CLINICAL INTERPRETATION OF PHQ2 SCORE: 0

## 2017-09-14 NOTE — PROGRESS NOTES
Subjective:      Jhony Rodriguez is a 67 y.o. male who presents with Skin Discoloration (on right side of face possible side effect from T-Dap) and Medication Refill (Tamsulosin )  The patient is here for followup of chronic medical problems listed below. The patient is compliant with medications and having no side effects from them. Denies chest pain, abdominal pain, dyspnea, myalgias, or cough.   Patient Active Problem List    Diagnosis Date Noted   • Need for 23-polyvalent pneumococcal polysaccharide vaccine- tbd fall 2018 09/14/2017   • Prophylactic antibiotic- for dental work due to TKRs 09/14/2017   • Vitamin D deficiency 09/14/2017   • Obesity (BMI 35.0-39.9 without comorbidity) ([facility]) 02/22/2017   • Iron deficiency- due to gastric bypass 01/19/2017   • Benign nodular prostatic hyperplasia with lower urinary tract symptoms- controlled with prn tamsulosin 2-3x/wk 01/19/2017   • Amnesia, global, transient- 2008, resolved; no recurrence; possibly due to due to medication interaction- celebrex and mobic   01/19/2017   • Vitamin B 12 deficiency- due to gastric bypass 06/20/2016   • History of obstructive sleep apnea- resolved with weight loss 06/20/2016   •  Mixed hyperlipidemia-mild, no meds, diet and exercise controlled 06/17/2016   • Gastric bypass status for obesity-2005 dr gallo 11/19/2012   • S/P RF ablation operation for arrhythmia-2008; no recurrence; no meds 12/10/2010     Allergies   Allergen Reactions   • Meloxicam Rash     alterred mentation   • Penicillins Rash     Rash  Tolerated cefazolin 5/5/15       Outpatient Medications Prior to Visit   Medication Sig Dispense Refill   • tamsulosin (FLOMAX) 0.4 MG capsule Take 1 Cap by mouth ONE-HALF HOUR AFTER BREAKFAST. 90 Cap 4   • NON SPECIFIED Shingles vaccine 1 Each 0   • Naproxen Sodium (ALEVE PO) Take  by mouth.     • Ferrous Sulfate Dried (SLOW RELEASE IRON) 45 MG TBCR Take 45 mg by mouth every day. 111 Tab 11   • Cyanocobalamin (VITAMIN B-12) 1000 MCG  "TABS Take 1 Tab by mouth every day. 30 Each 11   • Cholecalciferol (VITAMIN D3) 1000 UNIT CAPS Take 1,000 Units by mouth every day.     • Aspirin 325 MG TBEC Take 1 Tab by mouth 2 Times a Day. (Patient not taking: Reported on 9/14/2017) 30 Tab 0     No facility-administered medications prior to visit.                HPI    Review of Systems   Constitutional: Negative.    HENT: Negative.    Eyes: Negative.    Respiratory: Negative.    Cardiovascular: Negative.    Gastrointestinal: Negative.    Genitourinary: Negative.    Musculoskeletal: Negative.    Skin: Positive for rash.   Neurological: Negative.    Endo/Heme/Allergies: Negative.    Psychiatric/Behavioral: Negative.           Objective:     /60   Pulse 65   Temp 36.7 °C (98.1 °F)   Ht 1.956 m (6' 5.01\")   Wt (!) 139.3 kg (307 lb)   SpO2 95%   BMI 36.40 kg/m²      Physical Exam   Constitutional: He is oriented to person, place, and time. He appears well-developed and well-nourished. No distress.   HENT:   Head: Normocephalic and atraumatic.   Right Ear: External ear normal.   Left Ear: External ear normal.   Nose: Nose normal.   Mouth/Throat: Oropharynx is clear and moist. No oropharyngeal exudate.   Eyes: Conjunctivae and EOM are normal. Pupils are equal, round, and reactive to light. Right eye exhibits no discharge. Left eye exhibits no discharge. No scleral icterus.   Neck: Normal range of motion. Neck supple. No JVD present. No tracheal deviation present. No thyromegaly present.   Cardiovascular: Normal rate, regular rhythm, normal heart sounds and intact distal pulses.  Exam reveals no gallop and no friction rub.    No murmur heard.  Pulmonary/Chest: Effort normal and breath sounds normal. No stridor. No respiratory distress. He has no wheezes. He has no rales. He exhibits no tenderness.   Abdominal: Soft. Bowel sounds are normal. He exhibits no distension and no mass. There is no tenderness. There is no rebound and no guarding.   Musculoskeletal: " Normal range of motion. He exhibits no edema or tenderness.   Lymphadenopathy:     He has no cervical adenopathy.   Neurological: He is alert and oriented to person, place, and time. He has normal reflexes. He displays normal reflexes. He exhibits normal muscle tone. Coordination normal.   Skin: Skin is warm and dry. Rash noted. He is not diaphoretic. There is erythema. No pallor.   Eczematous raised fluent rash involving anterior lateral neck bilaterally as well as forearms with maculopapular eruptions dorsally   Psychiatric: He has a normal mood and affect. His behavior is normal. Judgment and thought content normal.     No visits with results within 1 Month(s) from this visit.   Latest known visit with results is:   Orders Only on 06/22/2016   Component Date Value   • Occult Blood, IA 06/23/2016 Negative       No results found for: HBA1C  Lab Results   Component Value Date/Time    SODIUM 141 06/21/2016 09:57 AM    SODIUM 139 04/28/2015 09:39 AM    POTASSIUM 4.8 06/21/2016 09:57 AM    POTASSIUM 4.4 04/28/2015 09:39 AM    CHLORIDE 102 06/21/2016 09:57 AM    CHLORIDE 109 04/28/2015 09:39 AM    CO2 23 06/21/2016 09:57 AM    CO2 26 04/28/2015 09:39 AM    GLUCOSE 91 06/21/2016 09:57 AM    GLUCOSE 67 04/28/2015 09:39 AM    BUN 22 06/21/2016 09:57 AM    BUN 20 04/28/2015 09:39 AM    CREATININE 0.70 (L) 06/21/2016 09:57 AM    CREATININE 0.84 04/28/2015 09:39 AM    CREATININE 0.77 12/06/2010 08:03 AM    BUNCREATRAT 31 (H) 06/21/2016 09:57 AM    BUNCREATRAT 36 (H) 12/06/2010 08:03 AM    GLOMRATE >59 12/06/2010 08:03 AM    ALKPHOSPHAT 84 06/21/2016 09:57 AM    ALKPHOSPHAT 56 07/07/2014 09:54 AM    ASTSGOT 17 06/21/2016 09:57 AM    ASTSGOT 18 07/07/2014 09:54 AM    ALTSGPT 14 06/21/2016 09:57 AM    ALTSGPT 12 07/07/2014 09:54 AM    TBILIRUBIN 0.5 06/21/2016 09:57 AM    TBILIRUBIN 0.6 07/07/2014 09:54 AM     Lab Results   Component Value Date/Time    INR 1.03 10/21/2013 12:12 PM    INR 1.51 (H) 06/12/2008 06:30 AM     Lab  Results   Component Value Date/Time    CHOLSTRLTOT 175 06/21/2016 09:57 AM    CHOLSTRLTOT 176 07/07/2014 09:54 AM     (H) 06/21/2016 09:57 AM     (H) 07/07/2014 09:54 AM    HDL 53 06/21/2016 09:57 AM    HDL 48 07/07/2014 09:54 AM    TRIGLYCERIDE 81 06/21/2016 09:57 AM    TRIGLYCERIDE 80 07/07/2014 09:54 AM       No results found for: TESTOSTERONE  Lab Results   Component Value Date/Time    TSH 2.700 06/21/2016 09:57 AM    TSH 3.190 07/11/2013 07:33 AM     Lab Results   Component Value Date/Time    FREET4 0.81 07/07/2014 09:54 AM    FREET4 0.89 10/21/2013 12:12 PM     No results found for: URICACID  No components found for: VITB12  Lab Results   Component Value Date/Time    25HYDROXY 23 (L) 07/07/2014 09:54 AM    25HYDROXY 24.5 (L) 07/11/2013 07:33 AM    25HYDROXY 13 (L) 11/16/2012 09:08 AM               Assessment/Plan:     1. Eczema, unspecified type     - REFERRAL TO DERMATOLOGY    2.  Mixed hyperlipidemia-mild, no meds, diet and exercise controlled    Under good control. Continue same regimen.- TSH; Future  - COMP METABOLIC PANEL; Future  - LIPID PROFILE; Future  - CBC WITH DIFFERENTIAL; Future    3. Iron deficiency- due to gastric bypass     Under good control. Continue same regimen.  4. Vitamin B 12 deficiency- due to gastric bypass     Under good control. Continue same regimen.- VITAMIN B12; Future  - FOLATE; Future  - IRON/TOTAL IRON BIND; Future  - FERRITIN; Future    5. Obesity (BMI 35.0-39.9 without comorbidity) ([facility])    diet/exercise/lose 15 lbs.; patient counseled    - Patient identified as having weight management issue.  Appropriate orders and counseling given.    6. Benign nodular prostatic hyperplasia with lower urinary tract symptoms- controlled with prn tamsulosin 2-3x/wk     Under good control. Continue same regimen.  7. Need for vaccination for pneumococcus     - PNEUMOCOCCAL CONJUGATE VACCINE 13-VALENT    8. Need for 23-polyvalent pneumococcal polysaccharide vaccine- tbd fall  2018       9. Need for shingles vaccine     - NON SPECIFIED; Shingles vaccine  Dispense: 1 Each; Refill: 0    10. Prophylactic antibiotic- for dental work due to TKRs      - clindamycin (CLEOCIN) 150 MG Cap; TAKE 4 CAPSULES BY MOUTH 1 HOUR PRIOR TO DENTAL APPT; Refill: 0    11. Vitamin D deficiency          Under good control. Continue same regimen.- VITAMIN D,25 HYDROXY; Future  - Cholecalciferol (VITAMIN D3) 2000 UNIT Cap; One a day  Dispense: 100 Cap; Refill: 4     30 minute face-to-face encounter took place today.  More than half of this time was spent in the coordination of care of the above problems, as well as counseling.

## 2018-02-05 ENCOUNTER — HOSPITAL ENCOUNTER (OUTPATIENT)
Facility: MEDICAL CENTER | Age: 68
End: 2018-02-07
Attending: EMERGENCY MEDICINE | Admitting: INTERNAL MEDICINE
Payer: MEDICARE

## 2018-02-05 ENCOUNTER — RESOLUTE PROFESSIONAL BILLING HOSPITAL PROF FEE (OUTPATIENT)
Dept: HOSPITALIST | Facility: MEDICAL CENTER | Age: 68
End: 2018-02-05
Payer: MEDICARE

## 2018-02-05 ENCOUNTER — APPOINTMENT (OUTPATIENT)
Dept: RADIOLOGY | Facility: MEDICAL CENTER | Age: 68
End: 2018-02-05
Payer: MEDICARE

## 2018-02-05 DIAGNOSIS — R07.9 CHEST PAIN, UNSPECIFIED TYPE: ICD-10-CM

## 2018-02-05 LAB
ALBUMIN SERPL BCP-MCNC: 3.7 G/DL (ref 3.2–4.9)
ALBUMIN/GLOB SERPL: 1.5 G/DL
ALP SERPL-CCNC: 62 U/L (ref 30–99)
ALT SERPL-CCNC: 22 U/L (ref 2–50)
ANION GAP SERPL CALC-SCNC: 7 MMOL/L (ref 0–11.9)
APTT PPP: 27.2 SEC (ref 24.7–36)
AST SERPL-CCNC: 21 U/L (ref 12–45)
BASOPHILS # BLD AUTO: 1 % (ref 0–1.8)
BASOPHILS # BLD: 0.06 K/UL (ref 0–0.12)
BILIRUB SERPL-MCNC: 0.7 MG/DL (ref 0.1–1.5)
BNP SERPL-MCNC: 60 PG/ML (ref 0–100)
BUN SERPL-MCNC: 27 MG/DL (ref 8–22)
CALCIUM SERPL-MCNC: 8.6 MG/DL (ref 8.4–10.2)
CHLORIDE SERPL-SCNC: 107 MMOL/L (ref 96–112)
CO2 SERPL-SCNC: 25 MMOL/L (ref 20–33)
CREAT SERPL-MCNC: 0.92 MG/DL (ref 0.5–1.4)
EKG IMPRESSION: NORMAL
EOSINOPHIL # BLD AUTO: 0.14 K/UL (ref 0–0.51)
EOSINOPHIL NFR BLD: 2.3 % (ref 0–6.9)
ERYTHROCYTE [DISTWIDTH] IN BLOOD BY AUTOMATED COUNT: 42.4 FL (ref 35.9–50)
GLOBULIN SER CALC-MCNC: 2.4 G/DL (ref 1.9–3.5)
GLUCOSE SERPL-MCNC: 100 MG/DL (ref 65–99)
HCT VFR BLD AUTO: 44.2 % (ref 42–52)
HGB BLD-MCNC: 14.6 G/DL (ref 14–18)
IMM GRANULOCYTES # BLD AUTO: 0.01 K/UL (ref 0–0.11)
IMM GRANULOCYTES NFR BLD AUTO: 0.2 % (ref 0–0.9)
INR PPP: 1.07 (ref 0.87–1.13)
LIPASE SERPL-CCNC: 24 U/L (ref 7–58)
LYMPHOCYTES # BLD AUTO: 0.81 K/UL (ref 1–4.8)
LYMPHOCYTES NFR BLD: 13.4 % (ref 22–41)
MCH RBC QN AUTO: 29 PG (ref 27–33)
MCHC RBC AUTO-ENTMCNC: 33 G/DL (ref 33.7–35.3)
MCV RBC AUTO: 87.7 FL (ref 81.4–97.8)
MONOCYTES # BLD AUTO: 0.39 K/UL (ref 0–0.85)
MONOCYTES NFR BLD AUTO: 6.5 % (ref 0–13.4)
NEUTROPHILS # BLD AUTO: 4.62 K/UL (ref 1.82–7.42)
NEUTROPHILS NFR BLD: 76.6 % (ref 44–72)
NRBC # BLD AUTO: 0 K/UL
NRBC BLD-RTO: 0 /100 WBC
PLATELET # BLD AUTO: 234 K/UL (ref 164–446)
PMV BLD AUTO: 9.8 FL (ref 9–12.9)
POTASSIUM SERPL-SCNC: 4.2 MMOL/L (ref 3.6–5.5)
PROT SERPL-MCNC: 6.1 G/DL (ref 6–8.2)
PROTHROMBIN TIME: 13.5 SEC (ref 12–14.6)
RBC # BLD AUTO: 5.04 M/UL (ref 4.7–6.1)
SODIUM SERPL-SCNC: 139 MMOL/L (ref 135–145)
TROPONIN I SERPL-MCNC: <0.02 NG/ML (ref 0–0.04)
TROPONIN I SERPL-MCNC: <0.02 NG/ML (ref 0–0.04)
TSH SERPL DL<=0.005 MIU/L-ACNC: 1.19 UIU/ML (ref 0.38–5.33)
WBC # BLD AUTO: 6 K/UL (ref 4.8–10.8)

## 2018-02-05 PROCEDURE — 93005 ELECTROCARDIOGRAM TRACING: CPT | Performed by: EMERGENCY MEDICINE

## 2018-02-05 PROCEDURE — 71045 X-RAY EXAM CHEST 1 VIEW: CPT

## 2018-02-05 PROCEDURE — 85610 PROTHROMBIN TIME: CPT

## 2018-02-05 PROCEDURE — 700102 HCHG RX REV CODE 250 W/ 637 OVERRIDE(OP): Performed by: INTERNAL MEDICINE

## 2018-02-05 PROCEDURE — 83690 ASSAY OF LIPASE: CPT

## 2018-02-05 PROCEDURE — 700111 HCHG RX REV CODE 636 W/ 250 OVERRIDE (IP): Performed by: INTERNAL MEDICINE

## 2018-02-05 PROCEDURE — 84484 ASSAY OF TROPONIN QUANT: CPT

## 2018-02-05 PROCEDURE — 85730 THROMBOPLASTIN TIME PARTIAL: CPT

## 2018-02-05 PROCEDURE — 99218 PR INITIAL OBSERVATION CARE,LEVL I: CPT | Performed by: INTERNAL MEDICINE

## 2018-02-05 PROCEDURE — G0378 HOSPITAL OBSERVATION PER HR: HCPCS

## 2018-02-05 PROCEDURE — 83880 ASSAY OF NATRIURETIC PEPTIDE: CPT

## 2018-02-05 PROCEDURE — 85025 COMPLETE CBC W/AUTO DIFF WBC: CPT

## 2018-02-05 PROCEDURE — 84443 ASSAY THYROID STIM HORMONE: CPT

## 2018-02-05 PROCEDURE — A9270 NON-COVERED ITEM OR SERVICE: HCPCS | Performed by: INTERNAL MEDICINE

## 2018-02-05 PROCEDURE — 80053 COMPREHEN METABOLIC PANEL: CPT

## 2018-02-05 PROCEDURE — 36415 COLL VENOUS BLD VENIPUNCTURE: CPT

## 2018-02-05 PROCEDURE — 93005 ELECTROCARDIOGRAM TRACING: CPT

## 2018-02-05 PROCEDURE — 83036 HEMOGLOBIN GLYCOSYLATED A1C: CPT

## 2018-02-05 PROCEDURE — 99285 EMERGENCY DEPT VISIT HI MDM: CPT

## 2018-02-05 RX ORDER — ACETAMINOPHEN 325 MG/1
650 TABLET ORAL EVERY 6 HOURS PRN
Status: DISCONTINUED | OUTPATIENT
Start: 2018-02-05 | End: 2018-02-07 | Stop reason: HOSPADM

## 2018-02-05 RX ORDER — ACETAMINOPHEN 160 MG
1 TABLET,DISINTEGRATING ORAL SEE ADMIN INSTRUCTIONS
COMMUNITY
End: 2023-03-22 | Stop reason: SDUPTHER

## 2018-02-05 RX ORDER — NITROGLYCERIN 0.4 MG/1
0.4 TABLET SUBLINGUAL
Status: DISCONTINUED | OUTPATIENT
Start: 2018-02-05 | End: 2018-02-07 | Stop reason: HOSPADM

## 2018-02-05 RX ORDER — POLYETHYLENE GLYCOL 3350 17 G/17G
1 POWDER, FOR SOLUTION ORAL
Status: DISCONTINUED | OUTPATIENT
Start: 2018-02-05 | End: 2018-02-07 | Stop reason: HOSPADM

## 2018-02-05 RX ORDER — AMOXICILLIN 250 MG
2 CAPSULE ORAL 2 TIMES DAILY
Status: DISCONTINUED | OUTPATIENT
Start: 2018-02-05 | End: 2018-02-07 | Stop reason: HOSPADM

## 2018-02-05 RX ORDER — CHOLECALCIFEROL (VITAMIN D3) 25 MCG
1 TABLET,CHEWABLE ORAL SEE ADMIN INSTRUCTIONS
COMMUNITY
End: 2023-03-22 | Stop reason: SDUPTHER

## 2018-02-05 RX ORDER — TAMSULOSIN HYDROCHLORIDE 0.4 MG/1
0.4 CAPSULE ORAL
Status: DISCONTINUED | OUTPATIENT
Start: 2018-02-06 | End: 2018-02-07 | Stop reason: HOSPADM

## 2018-02-05 RX ORDER — NAPROXEN SODIUM 220 MG
220 TABLET ORAL PRN
COMMUNITY
End: 2018-04-03

## 2018-02-05 RX ORDER — BISACODYL 10 MG
10 SUPPOSITORY, RECTAL RECTAL
Status: DISCONTINUED | OUTPATIENT
Start: 2018-02-05 | End: 2018-02-07 | Stop reason: HOSPADM

## 2018-02-05 RX ORDER — ATORVASTATIN CALCIUM 40 MG/1
40 TABLET, FILM COATED ORAL EVERY EVENING
Status: DISCONTINUED | OUTPATIENT
Start: 2018-02-05 | End: 2018-02-07 | Stop reason: HOSPADM

## 2018-02-05 RX ORDER — ONDANSETRON 2 MG/ML
4 INJECTION INTRAMUSCULAR; INTRAVENOUS EVERY 4 HOURS PRN
Status: DISCONTINUED | OUTPATIENT
Start: 2018-02-05 | End: 2018-02-07 | Stop reason: HOSPADM

## 2018-02-05 RX ORDER — ONDANSETRON 4 MG/1
4 TABLET, ORALLY DISINTEGRATING ORAL EVERY 4 HOURS PRN
Status: DISCONTINUED | OUTPATIENT
Start: 2018-02-05 | End: 2018-02-07 | Stop reason: HOSPADM

## 2018-02-05 RX ADMIN — ATORVASTATIN CALCIUM 40 MG: 40 TABLET, FILM COATED ORAL at 20:44

## 2018-02-05 RX ADMIN — ENOXAPARIN SODIUM 40 MG: 100 INJECTION SUBCUTANEOUS at 17:49

## 2018-02-05 ASSESSMENT — LIFESTYLE VARIABLES
EVER_SMOKED: NEVER
ALCOHOL_USE: NO

## 2018-02-05 ASSESSMENT — ENCOUNTER SYMPTOMS
VOMITING: 0
BLURRED VISION: 0
ABDOMINAL PAIN: 0
FOCAL WEAKNESS: 0
DIZZINESS: 0
COUGH: 0
WEIGHT LOSS: 0
CHILLS: 0
DOUBLE VISION: 0
SHORTNESS OF BREATH: 1
HEADACHES: 0
NAUSEA: 1
FEVER: 0
DIARRHEA: 0

## 2018-02-05 ASSESSMENT — PATIENT HEALTH QUESTIONNAIRE - PHQ9
SUM OF ALL RESPONSES TO PHQ9 QUESTIONS 1 AND 2: 0
SUM OF ALL RESPONSES TO PHQ QUESTIONS 1-9: 0
2. FEELING DOWN, DEPRESSED, IRRITABLE, OR HOPELESS: NOT AT ALL
1. LITTLE INTEREST OR PLEASURE IN DOING THINGS: NOT AT ALL

## 2018-02-05 ASSESSMENT — PAIN SCALES - GENERAL
PAINLEVEL_OUTOF10: 0
PAINLEVEL_OUTOF10: 0

## 2018-02-05 NOTE — ED PROVIDER NOTES
ED Provider Note    CHIEF COMPLAINT  Chief Complaint   Patient presents with   • Chest Pain   • Shortness of Breath       HPI  Jhony Rodriguez is a 67 y.o. male who presents to the emergency department complaining that he has had 2 unprovoked episodes of symptoms consisting of suddenly feeling very weak and slightly diaphoretic some feeling of irregular heart rate and very mild tightness in the chest. He also had some very minimal associated nausea and shortness of breath. The 1st episode was on Monday it resolved spontaneously after he took aspirin and the 2nd episode was yesterday. The patient called his insurance company was told to call an ambulance but instead he decided to come today to the emergency department for evaluation and he is currently asymptomatic. He did take aspirin this morning. He does not recognize any specific precipitating events or exacerbating or alleviating factors    REVIEW OF SYSTEMS no hemoptysis no fever or chills no abdominal pain back pain vomiting or diarrhea. All other systems negative    PAST MEDICAL HISTORY  Past Medical History:   Diagnosis Date   • Arthritis     osteo knees   • Snoring     has had sleep study no cpap   The patient had an ablation procedure for atrial fibrillation approximately 15 years ago and has not had any recurrent atrial fibrillation.    FAMILY HISTORY  Family History   Problem Relation Age of Onset   • Cancer Maternal Aunt    • Cancer Maternal Uncle    • Cancer Maternal Grandfather        SOCIAL HISTORY  Social History     Social History   • Marital status:      Spouse name: N/A   • Number of children: N/A   • Years of education: N/A     Social History Main Topics   • Smoking status: Former Smoker     Years: 3.00     Types: Cigars     Quit date: 1/2/2012   • Smokeless tobacco: Never Used      Comment: occl cigar   • Alcohol use Yes      Comment: 1 per week   • Drug use: No   • Sexual activity: Not on file     Other Topics Concern   • Not on file  "    Social History Narrative   • No narrative on file       SURGICAL HISTORY  Past Surgical History:   Procedure Laterality Date   • KNEE ARTHROPLASTY TOTAL Left 5/5/2015    Procedure: KNEE ARTHROPLASTY TOTAL ;  Surgeon: Mike Michele M.D.;  Location: SURGERY [facility];  Service:    • KNEE ARTHROPLASTY TOTAL  1/6/2015    Performed by Mike Michele M.D. at SURGERY [facility]   • INGUINAL HERNIA REPAIR  8/16/2013    Performed by Martin Dillon M.D. at SURGERY [facility]   • OTHER ORTHOPEDIC SURGERY  2006    left knee meniscectomy; dr rhodes   • GASTRIC BYPASS LAPAROSCOPIC  2004    dr. gallo   • LAPAROTOMY  2004    bowel perforation repair, following gastric  bypass       CURRENT MEDICATIONS  Home Medications     Reviewed by Isacc Sparks (Pharmacy Tech) on 02/05/18 at 1407  Med List Status: Complete   Medication Last Dose Status   Cholecalciferol (VITAMIN D3) 1000 UNIT CAPS > 1 week Active   Cholecalciferol (VITAMIN D3) 2000 UNIT Cap > 1 week Active   Cyanocobalamin (B-12) 1000 MCG Tab CR > 1 week Active   Ferrous Sulfate Dried (SLOW RELEASE IRON) 45 MG Tab CR > 1 week Active   naproxen (ALEVE) 220 MG tablet 1/29/2018 Active   tamsulosin (FLOMAX) 0.4 MG capsule 2/1/2018 Active                ALLERGIES  Allergies   Allergen Reactions   • Meloxicam Rash     alterred mentation   • Penicillins Rash     Rash  Tolerated cefazolin 5/5/15       PHYSICAL EXAM  VITAL SIGNS: /74   Pulse 60   Temp 36.9 °C (98.5 °F)   Resp 18   Ht 1.956 m (6' 5\")   Wt (!) 142.4 kg (313 lb 15 oz)   SpO2 95%   BMI 37.23 kg/m²    Oxygen saturation is interpreted as adequate  Constitutional: Awake verbal mildly anxious but otherwise well-appearing individual in no distress  HENT: Mucous membranes are moist  Eyes: Erythema or discharge or jaundice  Neck: Trachea midline no JVD  Cardiovascular: Regular rate and rhythm  Lungs: Clear and equal bilaterally with no apparent difficulty breathing  Abdomen/Back: " Obese soft nontender no rebound or guarding or peritoneal findings no CVA tenderness with percussion  Skin: Warm and dry  Musculoskeletal: There is 1+ edema symmetrically in both lower extremities  Neurologic: Awake verbal moving all extremities without difficulty    Laboratory  CBC showed white blood count 6.0 hemoglobin is adequate at 14.6 patient metabolic panels unremarkable LFTs and lipase are normal troponin was within the normal range at less than 0.02 and the BMP is normal at 60 INR is 1.17    EKG interpretation  12-lead EKG shows sinus rhythm 61 bpm there is a left axis deviation there is slight widening of the QRS complex suggesting possible bundle branch block pattern and there is no pathologic ST elevation or depression findings are similar to a cardiogram from January 2, 2015    Radiology  DX-CHEST-LIMITED (1 VIEW)    (Results Pending)   Chest x-ray report is pending at this time    MEDICAL DECISION MAKING and DISPOSITION  In the emergency department an IV was established the patient was placed on the cardiac monitor and he remains asymptomatic and hemodynamically stable. He took aspirin prior to arrival. I reviewed all the findings with the patient and I reviewed the case with the hospitalist and the patient is admitted to the hospitalist service for further evaluation and treatment    IMPRESSION  1. Chest pain      Electronically signed by: Tru Brewer, 2/5/2018 2:39 PM

## 2018-02-05 NOTE — ED NOTES
1338:  PIV placed in LAC, blood drawn and sent to lab.    Discussed POC w/ pt and spouse including pending test results.  Pt currently denies c/o CP or feeling short of breath.

## 2018-02-05 NOTE — ED NOTES
SOB on last Monday. Concerned he had an irregular pulse on Monday - states hx of ablation.  States feeling SOB and chest pain, and nausea through out the week.   Stopped his flomax.

## 2018-02-06 LAB
ANION GAP SERPL CALC-SCNC: 7 MMOL/L (ref 0–11.9)
BASOPHILS # BLD AUTO: 1.2 % (ref 0–1.8)
BASOPHILS # BLD: 0.06 K/UL (ref 0–0.12)
BUN SERPL-MCNC: 20 MG/DL (ref 8–22)
CALCIUM SERPL-MCNC: 8.4 MG/DL (ref 8.4–10.2)
CHLORIDE SERPL-SCNC: 107 MMOL/L (ref 96–112)
CHOLEST SERPL-MCNC: 135 MG/DL (ref 100–199)
CO2 SERPL-SCNC: 26 MMOL/L (ref 20–33)
CREAT SERPL-MCNC: 0.73 MG/DL (ref 0.5–1.4)
DEPRECATED D DIMER PPP IA-ACNC: <200 NG/ML(D-DU)
EOSINOPHIL # BLD AUTO: 0.21 K/UL (ref 0–0.51)
EOSINOPHIL NFR BLD: 4.4 % (ref 0–6.9)
ERYTHROCYTE [DISTWIDTH] IN BLOOD BY AUTOMATED COUNT: 42.2 FL (ref 35.9–50)
EST. AVERAGE GLUCOSE BLD GHB EST-MCNC: 111 MG/DL
GLUCOSE SERPL-MCNC: 88 MG/DL (ref 65–99)
HBA1C MFR BLD: 5.5 % (ref 0–5.6)
HCT VFR BLD AUTO: 40.9 % (ref 42–52)
HDLC SERPL-MCNC: 51 MG/DL
HGB BLD-MCNC: 13.3 G/DL (ref 14–18)
IMM GRANULOCYTES # BLD AUTO: 0.01 K/UL (ref 0–0.11)
IMM GRANULOCYTES NFR BLD AUTO: 0.2 % (ref 0–0.9)
LDLC SERPL CALC-MCNC: 76 MG/DL
LYMPHOCYTES # BLD AUTO: 0.87 K/UL (ref 1–4.8)
LYMPHOCYTES NFR BLD: 18 % (ref 22–41)
MCH RBC QN AUTO: 28.7 PG (ref 27–33)
MCHC RBC AUTO-ENTMCNC: 32.5 G/DL (ref 33.7–35.3)
MCV RBC AUTO: 88.1 FL (ref 81.4–97.8)
MONOCYTES # BLD AUTO: 0.37 K/UL (ref 0–0.85)
MONOCYTES NFR BLD AUTO: 7.7 % (ref 0–13.4)
NEUTROPHILS # BLD AUTO: 3.3 K/UL (ref 1.82–7.42)
NEUTROPHILS NFR BLD: 68.5 % (ref 44–72)
NRBC # BLD AUTO: 0 K/UL
NRBC BLD-RTO: 0 /100 WBC
PLATELET # BLD AUTO: 194 K/UL (ref 164–446)
PMV BLD AUTO: 10.5 FL (ref 9–12.9)
POTASSIUM SERPL-SCNC: 4 MMOL/L (ref 3.6–5.5)
RBC # BLD AUTO: 4.64 M/UL (ref 4.7–6.1)
SODIUM SERPL-SCNC: 140 MMOL/L (ref 135–145)
TRIGL SERPL-MCNC: 39 MG/DL (ref 0–149)
WBC # BLD AUTO: 4.8 K/UL (ref 4.8–10.8)

## 2018-02-06 PROCEDURE — G0378 HOSPITAL OBSERVATION PER HR: HCPCS

## 2018-02-06 PROCEDURE — 700102 HCHG RX REV CODE 250 W/ 637 OVERRIDE(OP): Performed by: INTERNAL MEDICINE

## 2018-02-06 PROCEDURE — 80061 LIPID PANEL: CPT

## 2018-02-06 PROCEDURE — A9270 NON-COVERED ITEM OR SERVICE: HCPCS | Performed by: INTERNAL MEDICINE

## 2018-02-06 PROCEDURE — 85025 COMPLETE CBC W/AUTO DIFF WBC: CPT

## 2018-02-06 PROCEDURE — 99225 PR SUBSEQUENT OBSERVATION CARE,LEVEL II: CPT | Performed by: FAMILY MEDICINE

## 2018-02-06 PROCEDURE — 85379 FIBRIN DEGRADATION QUANT: CPT

## 2018-02-06 PROCEDURE — 80048 BASIC METABOLIC PNL TOTAL CA: CPT

## 2018-02-06 RX ADMIN — ASPIRIN 81 MG: 81 TABLET, COATED ORAL at 12:53

## 2018-02-06 ASSESSMENT — ENCOUNTER SYMPTOMS
DIARRHEA: 0
NAUSEA: 0
FEVER: 0
CHILLS: 0
PALPITATIONS: 1
BLURRED VISION: 0
WEAKNESS: 0
WHEEZING: 0
SORE THROAT: 0
SHORTNESS OF BREATH: 0
VOMITING: 0
HEARTBURN: 0
ABDOMINAL PAIN: 0
COUGH: 0
DIZZINESS: 0

## 2018-02-06 ASSESSMENT — PAIN SCALES - GENERAL
PAINLEVEL_OUTOF10: 0

## 2018-02-06 NOTE — PROGRESS NOTES
Bedside report given to dayshift ALEXUS Blevins. Plan of care discussed. All questions answered. Pt out of bed, stretching his legs. No complaints or needs from patient as of this time.

## 2018-02-06 NOTE — PROGRESS NOTES
Pt awake, sitting at the edge of his bed watching movie. Pt stable, no complaints or needs currently reported. Safety measures in place. Will continue to monitor.   decreased strength/impaired balance/impaired postural control

## 2018-02-06 NOTE — CARE PLAN
Problem: Communication  Goal: The ability to communicate needs accurately and effectively will improve  Educated pt on signs and symptoms of chest pain and to call right away when he experiences any of these. Pt verbalized understanding.

## 2018-02-06 NOTE — PROGRESS NOTES
Paged Dr. Telles at 0542 regarding pt's low BP of 95/52 and HR 51. MD called back at 0551 and gave an order to administer NS 250cc bolus x1 over telephone with read-back.

## 2018-02-06 NOTE — PROGRESS NOTES
NS 250cc bolus completed. VSS obtained. /66 (MAP 77), HR 56, 98% on O2. No other complaints or needs identified by pt currently. Pt aware of NPO status for a stress test this morning and verbalized understanding of the procedure.

## 2018-02-06 NOTE — CARE PLAN
Problem: Safety  Goal: Will remain free from falls  Oriented pt to room and environment. Educated pt on the importance of call light especially when he's feeling dizzy or experiences chest pain or shortness of breath.

## 2018-02-06 NOTE — PROGRESS NOTES
Patient presents for treadmill test without imaging.  Chart reviewed.  Most recent ECG shows LBBB.  Call to Dr. Ramírez.  Order received to cancel TM order. Discussed rationale for canceling TM with patient who states he understands.  Return to [facility] via w/c with transport. Discussed with Gen VAUGHAN, attending nurse.

## 2018-02-06 NOTE — H&P
Hospital Medicine History and Physical    Date of Service  2/5/2018    Chief Complaint  Chief Complaint   Patient presents with   • Chest Pain   • Shortness of Breath       History of Presenting Illness  67 y.o. male who presented 2/5/2018 with chest pain.    1 week ago and today, patient experienced 15 minutes of chest tightness, nausea, and SOB. Both events occurred at rest. He measured his HR and it was low 60s, which is low for him. He did take aspirin.  He denies CP with exertion though he is mostly sedentary. He's a nonsmoker and negative family history for vascular disease. He called [facility] for advice and came to the ED.  He has h/o afib s/p ablation 12 years ago, not on any medications.    In the ED, initial troponin was normal. EKG showed possible new LBBB though appears unchanged from previous EKG. Telemetry shows HR 50-60s. He currently denies CP   Primary Care Physician  Reinaldo Fenton M.D.    Consultants  none    Code Status  Full    Review of Systems  Review of Systems   Constitutional: Negative for chills, fever and weight loss.   Eyes: Negative for blurred vision and double vision.   Respiratory: Positive for shortness of breath. Negative for cough.    Cardiovascular: Positive for chest pain. Negative for leg swelling.   Gastrointestinal: Positive for nausea. Negative for abdominal pain, diarrhea and vomiting.   Genitourinary: Negative for dysuria.   Skin: Negative for rash.   Neurological: Negative for dizziness, focal weakness and headaches.        Past Medical History  Past Medical History:   Diagnosis Date   • Arthritis     osteo knees   • Snoring     has had sleep study no cpap       Surgical History  Past Surgical History:   Procedure Laterality Date   • KNEE ARTHROPLASTY TOTAL Left 5/5/2015    Procedure: KNEE ARTHROPLASTY TOTAL ;  Surgeon: Mike Michele M.D.;  Location: SURGERY [facility];  Service:    • KNEE ARTHROPLASTY TOTAL  1/6/2015    Performed by Mike Michele M.D.  at SURGERY [facility]   • INGUINAL HERNIA REPAIR  2013    Performed by Martin Dillon M.D. at SURGERY [facility]   • OTHER ORTHOPEDIC SURGERY  2006    left knee meniscectomy; dr rhodes   • GASTRIC BYPASS LAPAROSCOPIC      dr. gallo   • LAPAROTOMY  2004    bowel perforation repair, following gastric  bypass       Medications  No current facility-administered medications on file prior to encounter.      Current Outpatient Prescriptions on File Prior to Encounter   Medication Sig Dispense Refill   • tamsulosin (FLOMAX) 0.4 MG capsule Take 1 Cap by mouth ONE-HALF HOUR AFTER BREAKFAST. 90 Cap 4   • Cholecalciferol (VITAMIN D3) 1000 UNIT CAPS Take 1,000 Units by mouth See Admin Instructions. 2 times a week         Family History  Family History   Problem Relation Age of Onset   • Cancer Maternal Aunt    • Cancer Maternal Uncle    • Cancer Maternal Grandfather        Social History  Social History   Substance Use Topics   • Smoking status: Former Smoker     Years: 3.00     Types: Cigars     Quit date: 2012   • Smokeless tobacco: Never Used      Comment: occl cigar   • Alcohol use Yes      Comment: 1 per week       Allergies  Allergies   Allergen Reactions   • Meloxicam Rash     alterred mentation   • Penicillins Rash     Rash  Tolerated cefazolin 5/5/15        Physical Exam  Laboratory   Hemodynamics  Temp (24hrs), Av.9 °C (98.5 °F), Min:36.9 °C (98.5 °F), Max:36.9 °C (98.5 °F)   Temperature: 36.9 °C (98.5 °F)  Pulse  Av  Min: 60  Max: 60    Blood Pressure : 124/74, NIBP: 128/81      Respiratory      Respiration: 18, Pulse Oximetry: 95 %             Physical Exam   Constitutional: He is oriented to person, place, and time. He appears well-developed and well-nourished. He is cooperative.   HENT:   Head: Normocephalic and atraumatic.   Eyes: Conjunctivae and EOM are normal.   Cardiovascular: Normal rate and regular rhythm.  Exam reveals no gallop and no friction rub.    No murmur  heard.  Pulmonary/Chest: Effort normal and breath sounds normal. He has no wheezes. He has no rales. He exhibits no tenderness.   Abdominal: Soft. There is no tenderness. There is no rebound and no guarding.   Musculoskeletal: He exhibits edema.   Neurological: He is alert and oriented to person, place, and time.   Skin: Skin is warm and dry.       Recent Labs      02/05/18   1338   WBC  6.0   RBC  5.04   HEMOGLOBIN  14.6   HEMATOCRIT  44.2   MCV  87.7   MCH  29.0   MCHC  33.0*   RDW  42.4   PLATELETCT  234   MPV  9.8     Recent Labs      02/05/18   1338   SODIUM  139   POTASSIUM  4.2   CHLORIDE  107   CO2  25   GLUCOSE  100*   BUN  27*   CREATININE  0.92   CALCIUM  8.6     Recent Labs      02/05/18   1338   ALTSGPT  22   ASTSGOT  21   ALKPHOSPHAT  62   TBILIRUBIN  0.7   LIPASE  24   GLUCOSE  100*     Recent Labs      02/05/18   1338   APTT  27.2   INR  1.07     Recent Labs      02/05/18   1338   BNPBTYPENAT  60         Lab Results   Component Value Date    TROPONINI <0.02 02/05/2018     Urinalysis:    Lab Results  Component Value Date/Time   SPECGRAVITY 1.019 04/28/2015 0939   GLUCOSEUR Negative 04/28/2015 0939   KETONES Negative 04/28/2015 0939   NITRITE Positive (A) 04/28/2015 0939   WBCURINE 10-20 (A) 04/28/2015 0939   RBCURINE 2-5 (A) 04/28/2015 0939   BACTERIA Few (A) 04/28/2015 0939   EPITHELCELL Few 04/28/2015 0939        Imaging  DX-CHEST-LIMITED (1 VIEW)    (Results Pending)   NM-CARDIAC STRESS TEST    (Results Pending)        Assessment/Plan     I anticipate this patient is appropriate for observation status at this time.    * Chest pain   Assessment & Plan    Nonexertional CP with associated symptoms concerning for CAD. Unknown lipids and A1c, has not seen a doctor in years. Possible arrhythmia given new bradycardia per patient. EKG showed possible LBBB, though QRS appears narrow and unchanged from previous EKG. Low Well's score, unlikely PE . CXR negative for lung pathology.  - telemetry  - serial  troponins and EKGs  - stress testing  - asa, statin  - lipid panel, A1c ordered          Benign nodular prostatic hyperplasia with lower urinary tract symptoms- controlled with prn tamsulosin 2-3x/wk- (present on admission)   Assessment & Plan    Cont flomax        S/P RF ablation operation for arrhythmia-2008; no recurrence; no meds- (present on admission)   Assessment & Plan    Monitor on telemetry            VTE prophylaxis: lovenox.

## 2018-02-06 NOTE — PROGRESS NOTES
Assessment completed. Scheduled medication (lipitor 40mg PO) given. Pt refused senna as he stated his bowel movements are regular. Pt sitting up in bed, resting. Pt verbalized concern regarding his low heart rate but no other complaints or needs as of this time. Call light and personal belongings within reach. Educated pt on the importance of informing me right away if he experiences chest pain or shortness of breath. Pt verbalized understanding and stated he's stable and feels comfortable for now. Will continue to monitor.

## 2018-02-06 NOTE — PROGRESS NOTES
[facility] Hospitalist Progress Note    Date of Service: 2018    Chief Complaint  67 y.o. male admitted 2018 with Chest pain, palpitations.    Interval Problem Update  CP - insists that it was tightness and not pain, with associated shortness of breath, D dimer negative  Palpitations - intermittent, has not been caught on monitor yet    Consultants/Specialty  None    Disposition  TBD        Review of Systems   Constitutional: Negative for chills, fever and malaise/fatigue.   HENT: Negative for hearing loss and sore throat.    Eyes: Negative for blurred vision.   Respiratory: Negative for cough, shortness of breath and wheezing.    Cardiovascular: Positive for chest pain and palpitations. Negative for leg swelling.   Gastrointestinal: Negative for abdominal pain, diarrhea, heartburn, nausea and vomiting.   Genitourinary: Negative for dysuria.   Neurological: Negative for dizziness and weakness.      Physical Exam  Laboratory/Imaging   Hemodynamics  Temp (24hrs), Av.6 °C (97.8 °F), Min:36.4 °C (97.6 °F), Max:36.7 °C (98 °F)   Temperature: 36.6 °C (97.8 °F)  Pulse  Av.3  Min: 51  Max: 79    Blood Pressure : 121/80      Respiratory      Respiration: 16, Pulse Oximetry: 96 %             Fluids    Intake/Output Summary (Last 24 hours) at 18 1535  Last data filed at 18 1222   Gross per 24 hour   Intake              480 ml   Output                0 ml   Net              480 ml       Nutrition  Orders Placed This Encounter   Procedures   • DIET ORDER     Standing Status:   Standing     Number of Occurrences:   1     Order Specific Question:   Diet:     Answer:   Regular [1]     Physical Exam   Constitutional: He is oriented to person, place, and time. He appears well-developed and well-nourished.   HENT:   Head: Normocephalic and atraumatic.   Eyes: Conjunctivae are normal. Pupils are equal, round, and reactive to light.   Neck: No tracheal deviation present. No thyromegaly present.   Cardiovascular:  Normal rate and regular rhythm.    Pulmonary/Chest: Effort normal and breath sounds normal.   Abdominal: Soft. Bowel sounds are normal. He exhibits no distension. There is no tenderness.   Musculoskeletal: He exhibits no edema.   Lymphadenopathy:     He has no cervical adenopathy.   Neurological: He is alert and oriented to person, place, and time.   Skin: Skin is warm and dry.   Nursing note and vitals reviewed.      Recent Labs      02/05/18   1338  02/06/18   0434   WBC  6.0  4.8   RBC  5.04  4.64*   HEMOGLOBIN  14.6  13.3*   HEMATOCRIT  44.2  40.9*   MCV  87.7  88.1   MCH  29.0  28.7   MCHC  33.0*  32.5*   RDW  42.4  42.2   PLATELETCT  234  194   MPV  9.8  10.5     Recent Labs      02/05/18   1338  02/06/18   0434   SODIUM  139  140   POTASSIUM  4.2  4.0   CHLORIDE  107  107   CO2  25  26   GLUCOSE  100*  88   BUN  27*  20   CREATININE  0.92  0.73   CALCIUM  8.6  8.4     Recent Labs      02/05/18   1338   APTT  27.2   INR  1.07     Recent Labs      02/05/18   1338   BNPBTYPENAT  60     Recent Labs      02/06/18   0434   TRIGLYCERIDE  39   HDL  51   LDL  76          Assessment/Plan     * Chest pain- (present on admission)   Assessment & Plan    ASA, Lipitor  For stress test          S/P RF ablation operation for arrhythmia-2008; no recurrence; no meds- (present on admission)   Assessment & Plan    Continue to monitor        Benign nodular prostatic hyperplasia with lower urinary tract symptoms- controlled with prn tamsulosin 2-3x/wk- (present on admission)   Assessment & Plan    Cont flomax            Reviewed items::  EKG reviewed, Radiology images reviewed, Labs reviewed and Medications reviewed  Jackson catheter::  No Jakcson  DVT prophylaxis pharmacological::  Enoxaparin (Lovenox)  Ulcer Prophylaxis::  No

## 2018-02-06 NOTE — CARE PLAN
Problem: Safety  Goal: Will remain free from injury  Outcome: PROGRESSING AS EXPECTED    Intervention: Provide assistance with mobility  Encourage to call for any assistance needed, call light within reach.      Problem: Knowledge Deficit  Goal: Knowledge of disease process/condition, treatment plan, diagnostic tests, and medications will improve  Outcome: PROGRESSING AS EXPECTED    Intervention: Explain information regarding disease process/condition, treatment plan, diagnostic tests, and medications and document in education  Updated with plan of care, for stress test today.

## 2018-02-06 NOTE — PROGRESS NOTES
Telemetry Summary    Rhythm SB/SR  HR 46-70s  Ectopy L-BBB, Frequent PVC/PAC/Bigem/Couplets  HI 0.16  QRS 0.12  QT 0.36

## 2018-02-07 ENCOUNTER — APPOINTMENT (OUTPATIENT)
Dept: RADIOLOGY | Facility: MEDICAL CENTER | Age: 68
End: 2018-02-07
Attending: INTERNAL MEDICINE
Payer: MEDICARE

## 2018-02-07 VITALS
HEIGHT: 77 IN | TEMPERATURE: 98.2 F | RESPIRATION RATE: 18 BRPM | BODY MASS INDEX: 37.07 KG/M2 | WEIGHT: 313.94 LBS | DIASTOLIC BLOOD PRESSURE: 71 MMHG | HEART RATE: 62 BPM | SYSTOLIC BLOOD PRESSURE: 121 MMHG | OXYGEN SATURATION: 95 %

## 2018-02-07 PROCEDURE — 99217 PR OBSERVATION CARE DISCHARGE: CPT | Performed by: FAMILY MEDICINE

## 2018-02-07 PROCEDURE — A9502 TC99M TETROFOSMIN: HCPCS

## 2018-02-07 PROCEDURE — G0378 HOSPITAL OBSERVATION PER HR: HCPCS

## 2018-02-07 ASSESSMENT — PATIENT HEALTH QUESTIONNAIRE - PHQ9
2. FEELING DOWN, DEPRESSED, IRRITABLE, OR HOPELESS: NOT AT ALL
SUM OF ALL RESPONSES TO PHQ QUESTIONS 1-9: 0
SUM OF ALL RESPONSES TO PHQ9 QUESTIONS 1 AND 2: 0
1. LITTLE INTEREST OR PLEASURE IN DOING THINGS: NOT AT ALL

## 2018-02-07 ASSESSMENT — PAIN SCALES - GENERAL
PAINLEVEL_OUTOF10: 0
PAINLEVEL_OUTOF10: 0

## 2018-02-07 NOTE — CARE PLAN
Problem: Knowledge Deficit  Goal: Knowledge of the prescribed therapeutic regimen will improve    Intervention: Discuss information regarding therpeutic regimen and document in education  Discussed plan of care.      Problem: Discharge Barriers/Planning  Goal: Patient's continuum of care needs will be met    Intervention: Assess potential discharge barriers on admission and throughout hospital stay  Will collaborate with care team all aspects of discharge plan.

## 2018-02-07 NOTE — DISCHARGE SUMMARY
Hospital Medicine Discharge Note     Admit Date:  2/5/2018       Discharge Date:   2/7/2018    Attending Physician:  Carlos Ramírez     Diagnoses (includes active and resolved):   Principal Problem:    Chest pain POA: Yes  Active Problems:    S/P RF ablation operation for arrhythmia-2008; no recurrence; no meds POA: Yes    Benign nodular prostatic hyperplasia with lower urinary tract symptoms- controlled with prn tamsulosin 2-3x/wk POA: Yes  Resolved Problems:    * No resolved hospital problems. *        Hospital Summary (Brief Narrative):       67-year-old white male who came in for chest pain. He describes the chest pain as chest tightness. He has some associated shortness of breath. He also some history of atrial fibrillation states he had an ablation in the past. He has had some palpitations. There were no events or arrhythmic events noted on the telemetry monitor. Patient underwent stress test which was noted to be negative. His symptoms have resolved. I advised to continue follow-up with cardiology.         Consultants:      None    Procedures:        None    Discharge Medications:        Medication Reconciliation Completed     Medication List      CONTINUE taking these medications      Instructions   ALEVE 220 MG tablet  Generic drug:  naproxen   Take 220 mg by mouth as needed.  Dose:  220 mg     B-12 1000 MCG Tbcr   Take 1 Tab by mouth See Admin Instructions. 2 times a week  Dose:  1 Tab     * Vitamin D3 2000 UNIT Caps   Take 1 Cap by mouth See Admin Instructions. 2 times a week  Dose:  1 Cap     * Cholecalciferol 1000 UNIT Caps   Take 1,000 Units by mouth See Admin Instructions. 2 times a week  Dose:  1000 Units     SLOW RELEASE IRON 45 MG Tbcr  Generic drug:  Ferrous Sulfate Dried   Take 1 Tab by mouth See Admin Instructions. 2 times a week  Dose:  1 Tab     tamsulosin 0.4 MG capsule  Commonly known as:  FLOMAX   Take 1 Cap by mouth ONE-HALF HOUR AFTER BREAKFAST.  Dose:  0.4 mg        * This list has 2  medication(s) that are the same as other medications prescribed for you. Read the directions carefully, and ask your doctor or other care provider to review them with you.                  Disposition:  Home    Diet:   Regular    Activity:   As tolerated    Code status:  Full    Primary Care Provider:    Reinaldo Fenton M.D.    Follow up appointment details :        Inocente Moss M.D.  [address]  [address]  [facility] [address]  [phone]          Future Appointments  Date Time Provider Department Center   2/8/2018 11:20 AM CARE MANAGER [facility]   2/13/2018 3:20 PM Reinaldo Fenton M.D. [facility]   2/15/2018 8:20 AM [facility]   3/15/2018 10:00 AM Reinaldo Fenton M.D. [facility]       Pending Studies:        None        #################################################      Most Recent Labs:    Lab Results   Component Value Date/Time    WBC 4.8 02/06/2018 04:34 AM    WBC 5.8 12/06/2010 08:03 AM    RBC 4.64 (L) 02/06/2018 04:34 AM    RBC 4.97 12/06/2010 08:03 AM    HEMOGLOBIN 13.3 (L) 02/06/2018 04:34 AM    HEMATOCRIT 40.9 (L) 02/06/2018 04:34 AM    MCV 88.1 02/06/2018 04:34 AM    MCV 85 12/06/2010 08:03 AM    MCH 28.7 02/06/2018 04:34 AM    MCH 28.0 12/06/2010 08:03 AM    MCHC 32.5 (L) 02/06/2018 04:34 AM    MPV 10.5 02/06/2018 04:34 AM    NEUTSPOLYS 68.50 02/06/2018 04:34 AM    LYMPHOCYTES 18.00 (L) 02/06/2018 04:34 AM    MONOCYTES 7.70 02/06/2018 04:34 AM    EOSINOPHILS 4.40 02/06/2018 04:34 AM    BASOPHILS 1.20 02/06/2018 04:34 AM    HYPOCHROMIA 1+ 07/07/2014 09:54 AM      Lab Results   Component Value Date/Time    SODIUM 140 02/06/2018 04:34 AM    POTASSIUM 4.0 02/06/2018 04:34 AM    CHLORIDE 107 02/06/2018 04:34 AM    CO2 26 02/06/2018 04:34 AM    GLUCOSE 88 02/06/2018 04:34 AM    BUN 20 02/06/2018 04:34 AM    CREATININE 0.73 02/06/2018 04:34 AM    CREATININE 0.77 12/06/2010 08:03 AM    BUNCREATRAT 31 (H) 06/21/2016 09:57 AM    BUNCREATRAT 36 (H) 12/06/2010 08:03 AM     GLOMRATE >59 12/06/2010 08:03 AM      Lab Results   Component Value Date/Time    ALTSGPT 22 02/05/2018 01:38 PM    ASTSGOT 21 02/05/2018 01:38 PM    ALKPHOSPHAT 62 02/05/2018 01:38 PM    TBILIRUBIN 0.7 02/05/2018 01:38 PM    LIPASE 24 02/05/2018 01:38 PM    ALBUMIN 3.7 02/05/2018 01:38 PM    GLOBULIN 2.4 02/05/2018 01:38 PM    INR 1.07 02/05/2018 01:38 PM     Lab Results   Component Value Date/Time    PROTHROMBTM 13.5 02/05/2018 01:38 PM    INR 1.07 02/05/2018 01:38 PM        Imaging/ Testing:      NM-CARDIAC STRESS TEST   Final Result      DX-CHEST-LIMITED (1 VIEW)   Final Result         No acute cardiopulmonary abnormalities are identified.          Instructions:      The patient was instructed to return to the ER in the event of worsening symptoms. I have counseled the patient on the importance of compliance and the patient has agreed to proceed with all medical recommendations and follow up plan indicated above.   The patient understands that all medications come with benefits and risks. Risks may include permanent injury or death and these risks can be minimized with close reassessment and monitoring.

## 2018-02-07 NOTE — PROGRESS NOTES
BS report received. Patient in bed, alert and oriented. No c/o pain, nausea or sob at this time. POC discussed, verbalized understanding. Bed low and locked. Call light and belonging within reach and demonstrated use of call light. Fall precaution in effect. Hourly rounding in place.

## 2018-02-07 NOTE — DISCHARGE INSTRUCTIONS
Discharge Instructions    Discharged to home by car with relative. Discharged via wheelchair, hospital escort: Yes.  Special equipment needed: Not Applicable    Be sure to schedule a follow-up appointment with your primary care doctor or any specialists as instructed.     Discharge Plan:   Influenza Vaccine Indication: Not indicated: Previously immunized this influenza season and > 8 years of age    I understand that a diet low in cholesterol, fat, and sodium is recommended for good health. Unless I have been given specific instructions below for another diet, I accept this instruction as my diet prescription.   Other diet: regular    Special Instructions: None    · Is patient discharged on Warfarin / Coumadin?   No     Depression / Suicide Risk    As you are discharged from this [facility] facility, it is important to learn how to keep safe from harming yourself.    Recognize the warning signs:  · Abrupt changes in personality, positive or negative- including increase in energy   · Giving away possessions  · Change in eating patterns- significant weight changes-  positive or negative  · Change in sleeping patterns- unable to sleep or sleeping all the time   · Unwillingness or inability to communicate  · Depression  · Unusual sadness, discouragement and loneliness  · Talk of wanting to die  · Neglect of personal appearance   · Rebelliousness- reckless behavior  · Withdrawal from people/activities they love  · Confusion- inability to concentrate     If you or a loved one observes any of these behaviors or has concerns about self-harm, here's what you can do:  · Talk about it- your feelings and reasons for harming yourself  · Remove any means that you might use to hurt yourself (examples: pills, rope, extension cords, firearm)  · Get professional help from the community (Mental Health, Substance Abuse, psychological counseling)  · Do not be alone:Call your Safe Contact- someone whom you trust who will be there for  you.  · Call your local CRISIS HOTLINE 272-9304 or 963-031-4732  · Call your local Children's Mobile Crisis Response Team [place] (431) 574-6571 or www.[employer]  · Call the toll free National Suicide Prevention Hotlines   · National Suicide Prevention Lifeline 475-424-ADUJ (7410)  · National [employer] Line Network 800-SUICIDE (760-1342)    Chest Pain Observation  It is often hard to give a specific diagnosis for the cause of chest pain. Among other possibilities your symptoms might be caused by inadequate oxygen delivery to your heart (angina). Angina that is not treated or evaluated can lead to a heart attack (myocardial infarction) or death.  Blood tests, electrocardiograms, and X-rays may have been done to help determine a possible cause of your chest pain. After evaluation and observation, your health care provider has determined that it is unlikely your pain was caused by an unstable condition that requires hospitalization. However, a full evaluation of your pain may need to be completed, with additional diagnostic testing as directed. It is very important to keep your follow-up appointments. Not keeping your follow-up appointments could result in permanent heart damage, disability, or death. If there is any problem keeping your follow-up appointments, you must call your health care provider.  HOME CARE INSTRUCTIONS   Due to the slight chance that your pain could be angina, it is important to follow your health care provider's treatment plan and also maintain a healthy lifestyle:  · Maintain or work toward achieving a healthy weight.  · Stay physically active and exercise regularly.  · Decrease your salt intake.  · Eat a balanced, healthy diet. Talk to a dietitian to learn about heart-healthy foods.  · Increase your fiber intake by including whole grains, vegetables, fruits, and nuts in your diet.  · Avoid situations that cause stress, anger, or depression.  · Take medicines as advised by your health  care provider. Report any side effects to your health care provider. Do not stop medicines or adjust the dosages on your own.  · Quit smoking. Do not use nicotine patches or gum until you check with your health care provider.  · Keep your blood pressure, blood sugar, and cholesterol levels within normal limits.  · Limit alcohol intake to no more than 1 drink per day for women who are not pregnant and 2 drinks per day for men.  · Do not abuse drugs.  SEEK IMMEDIATE MEDICAL CARE IF:  You have severe chest pain or pressure which may include symptoms such as:  · You feel pain or pressure in your arms, neck, jaw, or back.  · You have severe back or abdominal pain, feel sick to your stomach (nauseous), or throw up (vomit).  · You are sweating profusely.  · You are having a fast or irregular heartbeat.  · You feel short of breath while at rest.  · You notice increasing shortness of breath during rest, sleep, or with activity.  · You have chest pain that does not get better after rest or after taking your usual medicine.  · You wake from sleep with chest pain.  · You are unable to sleep because you cannot breathe.  · You develop a frequent cough or you are coughing up blood.  · You feel dizzy, faint, or experience extreme fatigue.  · You develop severe weakness, dizziness, fainting, or chills.  Any of these symptoms may represent a serious problem that is an emergency. Do not wait to see if the symptoms will go away. Call your local emergency services (911 in the [place] Do not drive yourself to the hospital.  MAKE SURE YOU:  · Understand these instructions.  · Will watch your condition.  · Will get help right away if you are not doing well or get worse.     This information is not intended to replace advice given to you by your health care provider. Make sure you discuss any questions you have with your health care provider.     Document Released: 01/20/2012 Document Revised: 12/23/2014 Document Reviewed:  06/19/2014  Elsevier Interactive Patient Education ©2016 [employer]

## 2018-02-07 NOTE — DISCHARGE PLANNING
Medical Social Work    Referral: CHAD reviewed the chart this AM.      Intervention: Per flowsheet, pt lives with spouse and expects to d.c home.  Pt does not have home O2 and is not currently on O2.  Therapies have not evaluated at the time of this note.  Based upon this review and information, there are no SS or DC needs identified at this time.      Plan: CHAD Wiley available to assist with any d.c planning.    Care Transition Team Assessment    Information Source  Information Given By: Patient    Readmission Evaluation  Is this a readmission?: No    Elopement Risk  Legal Hold: No  Ambulatory or Self Mobile in Wheelchair: No-Not an Elopement Risk  Disoriented: No  Psychiatric Symptoms: None  History of Wandering: No  Elopement this Admit: No  Vocalizing Wanting to Leave: No  Displays Behaviors, Body Language Wanting to Leave: No-Not at Risk for Elopement  Elopement Risk: Not at Risk for Elopement    Interdisciplinary Discharge Planning  Does Admitting Nurse Feel This Could be a Complex Discharge?: No  Primary Care Physician: Dr. Reinaldo Fenton  Lives with - Patient's Self Care Capacity: Spouse  Patient or legal guardian wants to designate a caregiver (see row info): No  Support Systems: Friends / Neighbors  Housing / Facility: [address]  Do You Take your Prescribed Medications Regularly: Yes  Able to Return to Previous ADL's: Yes  Mobility Issues: No  Prior Services: None  Patient Expects to be Discharged to:: home  Assistance Needed: No  Durable Medical Equipment: Not Applicable    Discharge Preparedness  What is your plan after discharge?: Uncertain - pending medical team collaboration  What are your discharge supports?: Spouse         Finances  Prescription Coverage: Yes    Vision / Hearing Impairment  Vision Impairment : Yes  Hearing Impairment : No    Values / Beliefs / Concerns  Values / Beliefs Concerns : No    Advance Directive  Advance Directive?: None    Domestic Abuse  Have you ever been the victim of  abuse or violence?: No  Physical Abuse or Sexual Abuse: No  Verbal Abuse or Emotional Abuse: No  Possible Abuse Reported to:: Not Applicable    Psychological Assessment  History of Substance Abuse: None

## 2018-02-07 NOTE — PROGRESS NOTES
Patient educated re: Treadmill stress test with NM MPI study. Nursing goals identified: knowledge deficit, potential for anxiety r/t stress test, potential for compromised cardiac output. Care plan includes educating patient, reassurance and access to ACLS cart/team. No acute symptoms of myocardial ischemia at this time. Pt denies CP, denies valve disease, denies taking Beta blocker. After resting images attained, patient prepped for treadmill stress test. Pt achieved 96% of MPHR, max HR = 149, METS = 10.1 without symptoms. Occ PAC's seen on monitor. See documentation. Patient tolerated procedure well.

## 2018-02-07 NOTE — CARE PLAN
Problem: Safety  Goal: Will remain free from falls    Intervention: Assess risk factors for falls  Room/floor clear. Non skid socks on. Proper signs up. Bed alarm on, bed low and locked. Call light and belonging within reach. Hourly rounding in place to make sure needs are met.        Problem: Infection  Goal: Will remain free from infection    Intervention: Implement standard precautions and perform hand washing before and after patient contact  Hand washing every encounter. IV ports scrubbed with alcohol when hanging medicine. Patient watch for s/s of infection. Patient taught to report s/s of infection, verbalized understanding.        Problem: Venous Thromboembolism (VTW)/Deep Vein Thrombosis (DVT) Prevention:  Goal: Patient will participate in Venous Thrombosis (VTE)/Deep Vein Thrombosis (DVT)Prevention Measures    Intervention: Encourage patient to perform ankle flex, foot rotation, and knee flex exercises in addition to other prophylatic measures every hour while awake  Encouraged to perform coughing and deep breathing, verbalized understanding.       Problem: Respiratory:  Goal: Respiratory status will improve    Intervention: Educate and encourage coughing and deep breathing  Encouraged to perform coughing and deep breathing, verbalized understanding.

## 2018-02-07 NOTE — PROGRESS NOTES
Report received from night shift RN (Cintia). Discussed plan of care, assumed care of patient. Safety measures in place. Will keep NPO until hearing from hospitalist.

## 2018-02-08 ENCOUNTER — TELEPHONE (OUTPATIENT)
Dept: MEDICAL GROUP | Age: 68
End: 2018-02-08

## 2018-02-08 ENCOUNTER — PATIENT OUTREACH (OUTPATIENT)
Dept: HEALTH INFORMATION MANAGEMENT | Facility: OTHER | Age: 68
End: 2018-02-08

## 2018-02-08 DIAGNOSIS — E29.1 HYPOGONADISM MALE: ICD-10-CM

## 2018-02-08 NOTE — TELEPHONE ENCOUNTER
----- Message from Hiral Jacob sent at 2/8/2018  1:04 PM PST -----  Patient would like testosterone and urine lab added to his labs  Will be by to  2/9/18  Thank You

## 2018-02-09 ENCOUNTER — HOSPITAL ENCOUNTER (OUTPATIENT)
Dept: LAB | Facility: MEDICAL CENTER | Age: 68
End: 2018-02-09
Attending: INTERNAL MEDICINE
Payer: MEDICARE

## 2018-02-09 DIAGNOSIS — E78.2 MIXED HYPERLIPIDEMIA: ICD-10-CM

## 2018-02-09 DIAGNOSIS — E55.9 VITAMIN D DEFICIENCY: ICD-10-CM

## 2018-02-09 DIAGNOSIS — E29.1 HYPOGONADISM MALE: ICD-10-CM

## 2018-02-09 DIAGNOSIS — E53.8 VITAMIN B 12 DEFICIENCY: ICD-10-CM

## 2018-02-09 LAB
25(OH)D3 SERPL-MCNC: 18 NG/ML (ref 30–100)
ALBUMIN SERPL BCP-MCNC: 3.7 G/DL (ref 3.2–4.9)
ALBUMIN/GLOB SERPL: 1.4 G/DL
ALP SERPL-CCNC: 69 U/L (ref 30–99)
ALT SERPL-CCNC: 14 U/L (ref 2–50)
ANION GAP SERPL CALC-SCNC: 4 MMOL/L (ref 0–11.9)
APPEARANCE UR: CLEAR
AST SERPL-CCNC: 17 U/L (ref 12–45)
BASOPHILS # BLD AUTO: 1 % (ref 0–1.8)
BASOPHILS # BLD: 0.06 K/UL (ref 0–0.12)
BILIRUB SERPL-MCNC: 0.5 MG/DL (ref 0.1–1.5)
BILIRUB UR QL STRIP.AUTO: NEGATIVE
BUN SERPL-MCNC: 22 MG/DL (ref 8–22)
CALCIUM SERPL-MCNC: 8.8 MG/DL (ref 8.5–10.5)
CHLORIDE SERPL-SCNC: 109 MMOL/L (ref 96–112)
CHOLEST SERPL-MCNC: 129 MG/DL (ref 100–199)
CO2 SERPL-SCNC: 27 MMOL/L (ref 20–33)
COLOR UR: YELLOW
CREAT SERPL-MCNC: 0.77 MG/DL (ref 0.5–1.4)
CULTURE IF INDICATED INDCX: NO UA CULTURE
EOSINOPHIL # BLD AUTO: 0.13 K/UL (ref 0–0.51)
EOSINOPHIL NFR BLD: 2.2 % (ref 0–6.9)
ERYTHROCYTE [DISTWIDTH] IN BLOOD BY AUTOMATED COUNT: 42.9 FL (ref 35.9–50)
FERRITIN SERPL-MCNC: 25.5 NG/ML (ref 22–322)
FOLATE SERPL-MCNC: 11.9 NG/ML
GLOBULIN SER CALC-MCNC: 2.6 G/DL (ref 1.9–3.5)
GLUCOSE SERPL-MCNC: 118 MG/DL (ref 65–99)
GLUCOSE UR STRIP.AUTO-MCNC: NEGATIVE MG/DL
HCT VFR BLD AUTO: 48.2 % (ref 42–52)
HDLC SERPL-MCNC: 58 MG/DL
HGB BLD-MCNC: 15.4 G/DL (ref 14–18)
IMM GRANULOCYTES # BLD AUTO: 0.04 K/UL (ref 0–0.11)
IMM GRANULOCYTES NFR BLD AUTO: 0.7 % (ref 0–0.9)
IRON SATN MFR SERPL: 16 % (ref 15–55)
IRON SERPL-MCNC: 64 UG/DL (ref 50–180)
KETONES UR STRIP.AUTO-MCNC: NEGATIVE MG/DL
LDLC SERPL CALC-MCNC: 61 MG/DL
LEUKOCYTE ESTERASE UR QL STRIP.AUTO: NEGATIVE
LYMPHOCYTES # BLD AUTO: 0.65 K/UL (ref 1–4.8)
LYMPHOCYTES NFR BLD: 10.9 % (ref 22–41)
MCH RBC QN AUTO: 28.8 PG (ref 27–33)
MCHC RBC AUTO-ENTMCNC: 32 G/DL (ref 33.7–35.3)
MCV RBC AUTO: 90.3 FL (ref 81.4–97.8)
MICRO URNS: NORMAL
MONOCYTES # BLD AUTO: 0.26 K/UL (ref 0–0.85)
MONOCYTES NFR BLD AUTO: 4.4 % (ref 0–13.4)
NEUTROPHILS # BLD AUTO: 4.8 K/UL (ref 1.82–7.42)
NEUTROPHILS NFR BLD: 80.8 % (ref 44–72)
NITRITE UR QL STRIP.AUTO: NEGATIVE
NRBC # BLD AUTO: 0 K/UL
NRBC BLD-RTO: 0 /100 WBC
PH UR STRIP.AUTO: 5.5 [PH]
PLATELET # BLD AUTO: 235 K/UL (ref 164–446)
PMV BLD AUTO: 10.6 FL (ref 9–12.9)
POTASSIUM SERPL-SCNC: 4.1 MMOL/L (ref 3.6–5.5)
PROT SERPL-MCNC: 6.3 G/DL (ref 6–8.2)
PROT UR QL STRIP: NEGATIVE MG/DL
PSA SERPL-MCNC: 0.16 NG/ML (ref 0–4)
RBC # BLD AUTO: 5.34 M/UL (ref 4.7–6.1)
RBC UR QL AUTO: NEGATIVE
SODIUM SERPL-SCNC: 140 MMOL/L (ref 135–145)
SP GR UR STRIP.AUTO: 1.03
TIBC SERPL-MCNC: 407 UG/DL (ref 250–450)
TRIGL SERPL-MCNC: 48 MG/DL (ref 0–149)
TSH SERPL DL<=0.005 MIU/L-ACNC: 2.06 UIU/ML (ref 0.38–5.33)
UROBILINOGEN UR STRIP.AUTO-MCNC: 0.2 MG/DL
VIT B12 SERPL-MCNC: 363 PG/ML (ref 211–911)
WBC # BLD AUTO: 5.9 K/UL (ref 4.8–10.8)

## 2018-02-09 PROCEDURE — 80053 COMPREHEN METABOLIC PANEL: CPT

## 2018-02-09 PROCEDURE — 84402 ASSAY OF FREE TESTOSTERONE: CPT

## 2018-02-09 PROCEDURE — 85025 COMPLETE CBC W/AUTO DIFF WBC: CPT

## 2018-02-09 PROCEDURE — 81003 URINALYSIS AUTO W/O SCOPE: CPT

## 2018-02-09 PROCEDURE — 82728 ASSAY OF FERRITIN: CPT

## 2018-02-09 PROCEDURE — 84270 ASSAY OF SEX HORMONE GLOBUL: CPT

## 2018-02-09 PROCEDURE — 82607 VITAMIN B-12: CPT

## 2018-02-09 PROCEDURE — 84403 ASSAY OF TOTAL TESTOSTERONE: CPT

## 2018-02-09 PROCEDURE — 36415 COLL VENOUS BLD VENIPUNCTURE: CPT

## 2018-02-09 PROCEDURE — 84153 ASSAY OF PSA TOTAL: CPT

## 2018-02-09 PROCEDURE — 84443 ASSAY THYROID STIM HORMONE: CPT

## 2018-02-09 PROCEDURE — 83540 ASSAY OF IRON: CPT

## 2018-02-09 PROCEDURE — 83550 IRON BINDING TEST: CPT

## 2018-02-09 PROCEDURE — 82306 VITAMIN D 25 HYDROXY: CPT

## 2018-02-09 PROCEDURE — 80061 LIPID PANEL: CPT

## 2018-02-09 PROCEDURE — 82746 ASSAY OF FOLIC ACID SERUM: CPT

## 2018-02-11 LAB
SHBG SERPL-SCNC: 114 NMOL/L (ref 11–80)
TESTOST FREE MFR SERPL: 0.8 % (ref 1.6–2.9)
TESTOST FREE SERPL-MCNC: 33 PG/ML (ref 47–244)
TESTOST SERPL-MCNC: 430 NG/DL (ref 300–720)

## 2018-02-13 ENCOUNTER — OFFICE VISIT (OUTPATIENT)
Dept: MEDICAL GROUP | Age: 68
End: 2018-02-13
Payer: MEDICARE

## 2018-02-13 VITALS
DIASTOLIC BLOOD PRESSURE: 62 MMHG | RESPIRATION RATE: 14 BRPM | WEIGHT: 315 LBS | OXYGEN SATURATION: 97 % | TEMPERATURE: 97.9 F | HEIGHT: 76 IN | BODY MASS INDEX: 38.36 KG/M2 | HEART RATE: 62 BPM | SYSTOLIC BLOOD PRESSURE: 102 MMHG

## 2018-02-13 DIAGNOSIS — Z98.890 S/P RF ABLATION OPERATION FOR ARRHYTHMIA: ICD-10-CM

## 2018-02-13 DIAGNOSIS — E61.1 IRON DEFICIENCY: ICD-10-CM

## 2018-02-13 DIAGNOSIS — Z23 NEED FOR 23-POLYVALENT PNEUMOCOCCAL POLYSACCHARIDE VACCINE: ICD-10-CM

## 2018-02-13 DIAGNOSIS — Z86.79 S/P RF ABLATION OPERATION FOR ARRHYTHMIA: ICD-10-CM

## 2018-02-13 DIAGNOSIS — E55.9 VITAMIN D DEFICIENCY: ICD-10-CM

## 2018-02-13 DIAGNOSIS — E78.2 MIXED HYPERLIPIDEMIA: ICD-10-CM

## 2018-02-13 DIAGNOSIS — E53.8 VITAMIN B 12 DEFICIENCY: ICD-10-CM

## 2018-02-13 DIAGNOSIS — N40.1 BENIGN NODULAR PROSTATIC HYPERPLASIA WITH LOWER URINARY TRACT SYMPTOMS: ICD-10-CM

## 2018-02-13 DIAGNOSIS — E66.9 OBESITY (BMI 30-39.9): ICD-10-CM

## 2018-02-13 DIAGNOSIS — Z12.11 SCREEN FOR COLON CANCER: ICD-10-CM

## 2018-02-13 PROBLEM — R07.9 CHEST PAIN: Status: RESOLVED | Noted: 2018-02-05 | Resolved: 2018-02-13

## 2018-02-13 PROCEDURE — 99215 OFFICE O/P EST HI 40 MIN: CPT | Performed by: INTERNAL MEDICINE

## 2018-02-13 ASSESSMENT — ENCOUNTER SYMPTOMS
CARDIOVASCULAR NEGATIVE: 1
EYES NEGATIVE: 1
NEUROLOGICAL NEGATIVE: 1
RESPIRATORY NEGATIVE: 1
CONSTITUTIONAL NEGATIVE: 1
PSYCHIATRIC NEGATIVE: 1
MUSCULOSKELETAL NEGATIVE: 1
GASTROINTESTINAL NEGATIVE: 1

## 2018-02-14 NOTE — PROGRESS NOTES
Subjective:      Jhony Rodriguez is a 67 y.o. male who presents with Annual Exam (physical)  The patient is here for followup of chronic medical problems listed below. The patient is compliant with medications and having no side effects from them. Denies chest pain, abdominal pain, dyspnea, myalgias, or cough.   Patient Active Problem List    Diagnosis Date Noted   • S/P RF ablation operation for arrhythmia-2008; no recurrence; no meds 12/10/2010     Priority: Medium   • Benign nodular prostatic hyperplasia with lower urinary tract symptoms- controlled with prn tamsulosin 2-3x/wk 01/19/2017     Priority: Low   • Obesity (BMI 30-39.9) 02/13/2018   • Need for 23-polyvalent pneumococcal polysaccharide vaccine- tbd fall 2018 09/14/2017   • Prophylactic antibiotic- for dental work due to TKRs 09/14/2017   • Vitamin D deficiency 09/14/2017   • Obesity (BMI 35.0-39.9 without comorbidity) 02/22/2017   • Iron deficiency- due to gastric bypass 01/19/2017   • Amnesia, global, transient- 2008, resolved; no recurrence; possibly due to due to medication interaction- celebrex and mobic   01/19/2017   • Vitamin B 12 deficiency- due to gastric bypass 06/20/2016   • History of obstructive sleep apnea- resolved with weight loss 06/20/2016   •  Mixed hyperlipidemia-mild, no meds, diet and exercise controlled 06/17/2016   • Gastric bypass status for obesity-2005 dr gallo 11/19/2012     Allergies   Allergen Reactions   • Meloxicam Rash     alterred mentation   • Penicillins Rash     Rash  Tolerated cefazolin 5/5/15     Outpatient Medications Prior to Visit   Medication Sig Dispense Refill   • Cholecalciferol (VITAMIN D3) 2000 UNIT Cap Take 1 Cap by mouth See Admin Instructions. 2 times a week     • Cyanocobalamin (B-12) 1000 MCG Tab CR Take 1 Tab by mouth See Admin Instructions. 2 times a week     • Ferrous Sulfate Dried (SLOW RELEASE IRON) 45 MG Tab CR Take 1 Tab by mouth See Admin Instructions. 2 times a week     • naproxen (ALEVE)  "220 MG tablet Take 220 mg by mouth as needed.     • tamsulosin (FLOMAX) 0.4 MG capsule Take 1 Cap by mouth ONE-HALF HOUR AFTER BREAKFAST. 90 Cap 4   • Cholecalciferol (VITAMIN D3) 1000 UNIT CAPS Take 1,000 Units by mouth See Admin Instructions. 2 times a week       No facility-administered medications prior to visit.                HPI    Review of Systems   Constitutional: Negative.    HENT: Negative.    Eyes: Negative.    Respiratory: Negative.    Cardiovascular: Negative.    Gastrointestinal: Negative.    Genitourinary: Negative.    Musculoskeletal: Negative.    Skin: Negative.    Neurological: Negative.    Endo/Heme/Allergies: Negative.    Psychiatric/Behavioral: Negative.           Objective:     /62   Pulse 62   Temp 36.6 °C (97.9 °F)   Resp 14   Ht 1.918 m (6' 3.5\")   Wt (!) 142.9 kg (315 lb)   SpO2 97%   BMI 38.85 kg/m²      Physical Exam   Constitutional: He is oriented to person, place, and time. He appears well-developed and well-nourished. No distress.   HENT:   Head: Normocephalic and atraumatic.   Right Ear: External ear normal.   Left Ear: External ear normal.   Nose: Nose normal.   Mouth/Throat: Oropharynx is clear and moist. No oropharyngeal exudate.   Eyes: Conjunctivae and EOM are normal. Pupils are equal, round, and reactive to light. Right eye exhibits no discharge. Left eye exhibits no discharge. No scleral icterus.   Neck: Normal range of motion. Neck supple. No JVD present. No tracheal deviation present. No thyromegaly present.   Cardiovascular: Normal rate, regular rhythm, normal heart sounds and intact distal pulses.  Exam reveals no gallop and no friction rub.    No murmur heard.  Pulmonary/Chest: Effort normal and breath sounds normal. No stridor. No respiratory distress. He has no wheezes. He has no rales. He exhibits no tenderness.   Abdominal: Soft. Bowel sounds are normal. He exhibits no distension and no mass. There is no tenderness. There is no rebound and no guarding. "   Musculoskeletal: Normal range of motion. He exhibits no edema or tenderness.   Lymphadenopathy:     He has no cervical adenopathy.   Neurological: He is alert and oriented to person, place, and time. He has normal reflexes. He displays normal reflexes. He exhibits normal muscle tone. Coordination normal.   Skin: Skin is warm and dry. No rash noted. He is not diaphoretic. No erythema. No pallor.   Psychiatric: He has a normal mood and affect. His behavior is normal. Judgment and thought content normal.     Hospital Outpatient Visit on 02/09/2018   Component Date Value   • Prostatic Specific Antig* 02/09/2018 0.16    • Color 02/09/2018 Yellow    • Character 02/09/2018 Clear    • Specific Gravity 02/09/2018 1.026    • Ph 02/09/2018 5.5    • Glucose 02/09/2018 Negative    • Ketones 02/09/2018 Negative    • Protein 02/09/2018 Negative    • Bilirubin 02/09/2018 Negative    • Urobilinogen, Urine 02/09/2018 0.2    • Nitrite 02/09/2018 Negative    • Leukocyte Esterase 02/09/2018 Negative    • Occult Blood 02/09/2018 Negative    • Micro Urine Req 02/09/2018 see below    • Culture Indicated 02/09/2018 No    • TSH 02/09/2018 2.060    • Sodium 02/09/2018 140    • Potassium 02/09/2018 4.1    • Chloride 02/09/2018 109    • Co2 02/09/2018 27    • Anion Gap 02/09/2018 4.0    • Glucose 02/09/2018 118*   • Bun 02/09/2018 22    • Creatinine 02/09/2018 0.77    • Calcium 02/09/2018 8.8    • AST(SGOT) 02/09/2018 17    • ALT(SGPT) 02/09/2018 14    • Alkaline Phosphatase 02/09/2018 69    • Total Bilirubin 02/09/2018 0.5    • Albumin 02/09/2018 3.7    • Total Protein 02/09/2018 6.3    • Globulin 02/09/2018 2.6    • A-G Ratio 02/09/2018 1.4    • Cholesterol,Tot 02/09/2018 129    • Triglycerides 02/09/2018 48    • HDL 02/09/2018 58    • LDL 02/09/2018 61    • WBC 02/09/2018 5.9    • RBC 02/09/2018 5.34    • Hemoglobin 02/09/2018 15.4    • Hematocrit 02/09/2018 48.2    • MCV 02/09/2018 90.3    • MCH 02/09/2018 28.8    • MCHC 02/09/2018 32.0*    • RDW 2018 42.9    • Platelet Count 2018 235    • MPV 2018 10.6    • Neutrophils-Polys 2018 80.80*   • Lymphocytes 2018 10.90*   • Monocytes 2018 4.40    • Eosinophils 2018 2.20    • Basophils 2018 1.00    • Immature Granulocytes 2018 0.70    • Nucleated RBC 2018 0.00    • Neutrophils (Absolute) 2018 4.80    • Lymphs (Absolute) 2018 0.65*   • Monos (Absolute) 2018 0.26    • Eos (Absolute) 2018 0.13    • Baso (Absolute) 2018 0.06    • Immature Granulocytes (a* 2018 0.04    • NRBC (Absolute) 2018 0.00    • Vitamin B12 -True Cobala* 2018 363    • Folate -Folic Acid 2018 11.9    • Iron 2018 64    • Total Iron Binding 2018 407    • % Saturation 2018 16    • Ferritin 2018 25.5    • 25-Hydroxy   Vitamin D 25 2018 18*   • Testosterone,Total 2018 430    • Sex Hormone Bind Globulin 2018 114*   • Free Testosterone 2018 33*   • Testosterone % Free 2018 0.8*   • GFR If  2018 >60    • GFR If Non  Ameri* 2018 >60    Admission on 2018, Discharged on 2018   Component Date Value   • Report 2018                      Value:[facility] Emergency Dept.    Test Date:  2018  Pt Name:    YOSSI MONTES               [facility]  MRN:        [id number]                      Room:  Gender:     Male                         Technician: WES  :        1950                   Requested By:ER TRIAGE PROTOCOL  Order #:    [id number]                    Reading MD:    Measurements  Intervals                                Axis  Rate:       61                           P:          37  NV:         190                          QRS:        -23  QRSD:       122                          T:          54  QT:         412  QTc:        415    Interpretive Statements  Sinus rhythm  Left bundle branch  block  Compared to ECG 01/02/2015 12:44:11  Left bundle-branch block now present  Sinus bradycardia no longer present     • Troponin I 02/05/2018 <0.02    • B Natriuretic Peptide 02/05/2018 60    • WBC 02/05/2018 6.0    • RBC 02/05/2018 5.04    • Hemoglobin 02/05/2018 14.6    • Hematocrit 02/05/2018 44.2    • MCV 02/05/2018 87.7    • MCH 02/05/2018 29.0    • MCHC 02/05/2018 33.0*   • RDW 02/05/2018 42.4    • Platelet Count 02/05/2018 234    • MPV 02/05/2018 9.8    • Neutrophils-Polys 02/05/2018 76.60*   • Lymphocytes 02/05/2018 13.40*   • Monocytes 02/05/2018 6.50    • Eosinophils 02/05/2018 2.30    • Basophils 02/05/2018 1.00    • Immature Granulocytes 02/05/2018 0.20    • Nucleated RBC 02/05/2018 0.00    • Neutrophils (Absolute) 02/05/2018 4.62    • Lymphs (Absolute) 02/05/2018 0.81*   • Monos (Absolute) 02/05/2018 0.39    • Eos (Absolute) 02/05/2018 0.14    • Baso (Absolute) 02/05/2018 0.06    • Immature Granulocytes (a* 02/05/2018 0.01    • NRBC (Absolute) 02/05/2018 0.00    • Sodium 02/05/2018 139    • Potassium 02/05/2018 4.2    • Chloride 02/05/2018 107    • Co2 02/05/2018 25    • Anion Gap 02/05/2018 7.0    • Glucose 02/05/2018 100*   • Bun 02/05/2018 27*   • Creatinine 02/05/2018 0.92    • Calcium 02/05/2018 8.6    • AST(SGOT) 02/05/2018 21    • ALT(SGPT) 02/05/2018 22    • Alkaline Phosphatase 02/05/2018 62    • Total Bilirubin 02/05/2018 0.7    • Albumin 02/05/2018 3.7    • Total Protein 02/05/2018 6.1    • Globulin 02/05/2018 2.4    • A-G Ratio 02/05/2018 1.5    • PT 02/05/2018 13.5    • INR 02/05/2018 1.07    • APTT 02/05/2018 27.2    • Lipase 02/05/2018 24    • GFR If  02/05/2018 >60    • GFR If Non  Ameri* 02/05/2018 >60    • Troponin I 02/05/2018 <0.02    • TSH 02/05/2018 1.190    • Glycohemoglobin 02/05/2018 5.5    • Est Avg Glucose 02/05/2018 111    • Sodium 02/06/2018 140    • Potassium 02/06/2018 4.0    • Chloride 02/06/2018 107    • Co2 02/06/2018 26    • Glucose  02/06/2018 88    • Bun 02/06/2018 20    • Creatinine 02/06/2018 0.73    • Calcium 02/06/2018 8.4    • Anion Gap 02/06/2018 7.0    • WBC 02/06/2018 4.8    • RBC 02/06/2018 4.64*   • Hemoglobin 02/06/2018 13.3*   • Hematocrit 02/06/2018 40.9*   • MCV 02/06/2018 88.1    • MCH 02/06/2018 28.7    • MCHC 02/06/2018 32.5*   • RDW 02/06/2018 42.2    • Platelet Count 02/06/2018 194    • MPV 02/06/2018 10.5    • Neutrophils-Polys 02/06/2018 68.50    • Lymphocytes 02/06/2018 18.00*   • Monocytes 02/06/2018 7.70    • Eosinophils 02/06/2018 4.40    • Basophils 02/06/2018 1.20    • Immature Granulocytes 02/06/2018 0.20    • Nucleated RBC 02/06/2018 0.00    • Neutrophils (Absolute) 02/06/2018 3.30    • Lymphs (Absolute) 02/06/2018 0.87*   • Monos (Absolute) 02/06/2018 0.37    • Eos (Absolute) 02/06/2018 0.21    • Baso (Absolute) 02/06/2018 0.06    • Immature Granulocytes (a* 02/06/2018 0.01    • NRBC (Absolute) 02/06/2018 0.00    • Cholesterol,Tot 02/06/2018 135    • Triglycerides 02/06/2018 39    • HDL 02/06/2018 51    • LDL 02/06/2018 76    • GFR If  02/06/2018 >60    • GFR If Non  Ameri* 02/06/2018 >60    • D-Dimer Screen 02/06/2018 <200       Lab Results   Component Value Date/Time    HBA1C 5.5 02/05/2018 01:38 PM     Lab Results   Component Value Date/Time    SODIUM 140 02/09/2018 10:32 AM    POTASSIUM 4.1 02/09/2018 10:32 AM    CHLORIDE 109 02/09/2018 10:32 AM    CO2 27 02/09/2018 10:32 AM    GLUCOSE 118 (H) 02/09/2018 10:32 AM    BUN 22 02/09/2018 10:32 AM    CREATININE 0.77 02/09/2018 10:32 AM    CREATININE 0.77 12/06/2010 08:03 AM    BUNCREATRAT 31 (H) 06/21/2016 09:57 AM    BUNCREATRAT 36 (H) 12/06/2010 08:03 AM    GLOMRATE >59 12/06/2010 08:03 AM    ALKPHOSPHAT 69 02/09/2018 10:32 AM    ASTSGOT 17 02/09/2018 10:32 AM    ALTSGPT 14 02/09/2018 10:32 AM    TBILIRUBIN 0.5 02/09/2018 10:32 AM     Lab Results   Component Value Date/Time    INR 1.07 02/05/2018 01:38 PM    INR 1.03 10/21/2013 12:12 PM     INR 1.51 (H) 06/12/2008 06:30 AM     Lab Results   Component Value Date/Time    CHOLSTRLTOT 129 02/09/2018 10:32 AM    LDL 61 02/09/2018 10:32 AM    HDL 58 02/09/2018 10:32 AM    TRIGLYCERIDE 48 02/09/2018 10:32 AM       Lab Results   Component Value Date/Time    TESTOSTERONE 430 02/09/2018 10:32 AM     Lab Results   Component Value Date/Time    TSH 2.700 06/21/2016 09:57 AM    TSH 3.190 07/11/2013 07:33 AM     Lab Results   Component Value Date/Time    FREET4 0.81 07/07/2014 09:54 AM    FREET4 0.89 10/21/2013 12:12 PM     No results found for: URICACID  No components found for: VITB12  Lab Results   Component Value Date/Time    25HYDROXY 18 (L) 02/09/2018 10:32 AM    25HYDROXY 23 (L) 07/07/2014 09:54 AM                 Assessment/Plan:     1.  Mixed hyperlipidemia-mild, no meds, diet and exercise controlled    Under good control. Continue same regimen.      2. Iron deficiency- due to gastric bypass        Under good control. Continue same regimen.  3. Vitamin B 12 deficiency- due to gastric bypass     Under good control. Continue same regimen.    4. S/P RF ablation operation for arrhythmia-2008; no recurrence; no meds          Under good control. Continue same regimen.    5. Benign nodular prostatic hyperplasia with lower urinary tract symptoms- controlled with prn tamsulosin 2-3x/wk      Under good control. Continue same regimen.    6. Need for 23-polyvalent pneumococcal polysaccharide vaccine- tbd fall 2018      Under good control. Continue same regimen.    7. Screen for colon cancer     - REFERRAL TO GASTROENTEROLOGY    8. Vitamin D deficiency     Under good control. Continue same regimen.  - Cholecalciferol 1000 UNIT Cap; Take 2 Caps by mouth See Admin Instructions. 2 times a week  Dispense: 100 Cap; Refill: 4    40 minute face-to-face encounter took place today.  More than half of this time was spent in the coordination of care of the above problems, as well as counseling.

## 2018-02-15 ENCOUNTER — OFFICE VISIT (OUTPATIENT)
Dept: MEDICAL GROUP | Facility: CLINIC | Age: 68
End: 2018-02-15
Payer: MEDICARE

## 2018-02-15 DIAGNOSIS — Z53.8 APPOINTMENT CANCELED BY HOSPITAL: ICD-10-CM

## 2018-03-09 ENCOUNTER — PATIENT OUTREACH (OUTPATIENT)
Dept: HEALTH INFORMATION MANAGEMENT | Facility: OTHER | Age: 68
End: 2018-03-09

## 2018-03-09 NOTE — PROGRESS NOTES
1. Attempt #: 1    2. [facility] Verified: yes    3. Verify PCP: yes    4. Care Team Updated:       •   DME Company (gait device, O2, CPAP, etc.): YES       •   Other Specialists (eye doctor, derm, GYN, cardiology, endo, etc): YES    5.  Reviewed/Updated the following with patient:       •   Communication Preference Obtained? YES       •   Preferred Pharmacy? YES       •   Preferred Lab? YES       •   Family History (document living status of immediate family members and if + hx of cancer, diabetes, hypertension, hyperlipidemia, heart attack, stroke) YES. Was Abstract Encounter opened and chart updated? YES    6. [facility] Activation: already active    7. [facility] Bridget: no    8. Annual Wellness Visit Scheduling  Scheduling Status:Scheduled      9. Care Gap Scheduling (Attempt to Schedule EACH Overdue Care Gap!)     Health Maintenance Due   Topic Date Due   • Annual Wellness Visit  1950   • IMM ZOSTER VACCINE  07/19/2012        Scheduled patient for Annual Wellness Visit    10. Patient was advised: “This is a free wellness visit. The provider will screen for medical conditions to help you stay healthy. If you have other concerns to address you may be asked to discuss these at a separate visit or there may be an additional fee.”     11. Patient was informed to arrive 15 min prior to their scheduled appointment and bring in their medication bottles.

## 2018-03-26 ENCOUNTER — TELEPHONE (OUTPATIENT)
Dept: MEDICAL GROUP | Age: 68
End: 2018-03-26

## 2018-03-26 NOTE — TELEPHONE ENCOUNTER
Future Appointments       Provider Department Center    4/3/2018 11:20 AM Reinaldo Fenton M.D.; [facility] [address]        ANNUAL WELLNESS VISIT PRE-VISIT PLANNING WITH OUTREACH    1.  Immunizations were updated in Epic using WebIZ?:Epic matches WebIZ       •  WebIZ Recommendations: ZOSTAVAX (Shingles)       •  Is patient due for Tdap? NO       •  Is patient due for Shingles?YES. Patient was not notified of copay/out of pocket cost.    2.  MDX printed for Provider? YES

## 2018-04-03 ENCOUNTER — OFFICE VISIT (OUTPATIENT)
Dept: MEDICAL GROUP | Age: 68
End: 2018-04-03
Payer: MEDICARE

## 2018-04-03 VITALS
OXYGEN SATURATION: 95 % | TEMPERATURE: 98.2 F | HEIGHT: 76 IN | BODY MASS INDEX: 38.36 KG/M2 | WEIGHT: 315 LBS | HEART RATE: 71 BPM | DIASTOLIC BLOOD PRESSURE: 62 MMHG | SYSTOLIC BLOOD PRESSURE: 124 MMHG

## 2018-04-03 DIAGNOSIS — Z00.00 MEDICARE ANNUAL WELLNESS VISIT, SUBSEQUENT: ICD-10-CM

## 2018-04-03 DIAGNOSIS — E61.1 IRON DEFICIENCY: ICD-10-CM

## 2018-04-03 DIAGNOSIS — Z86.79 S/P RF ABLATION OPERATION FOR ARRHYTHMIA: ICD-10-CM

## 2018-04-03 DIAGNOSIS — E66.9 OBESITY (BMI 30-39.9): ICD-10-CM

## 2018-04-03 DIAGNOSIS — Z98.890 S/P RF ABLATION OPERATION FOR ARRHYTHMIA: ICD-10-CM

## 2018-04-03 DIAGNOSIS — Z79.2 PROPHYLACTIC ANTIBIOTIC: ICD-10-CM

## 2018-04-03 DIAGNOSIS — Z86.69 HISTORY OF OBSTRUCTIVE SLEEP APNEA: ICD-10-CM

## 2018-04-03 DIAGNOSIS — Z98.84 GASTRIC BYPASS STATUS FOR OBESITY: ICD-10-CM

## 2018-04-03 DIAGNOSIS — N40.1 BENIGN NODULAR PROSTATIC HYPERPLASIA WITH LOWER URINARY TRACT SYMPTOMS: ICD-10-CM

## 2018-04-03 DIAGNOSIS — E29.1 HYPOGONADISM IN MALE: ICD-10-CM

## 2018-04-03 DIAGNOSIS — G45.4 AMNESIA, GLOBAL, TRANSIENT: ICD-10-CM

## 2018-04-03 DIAGNOSIS — E53.8 VITAMIN B 12 DEFICIENCY: ICD-10-CM

## 2018-04-03 DIAGNOSIS — Z23 NEED FOR 23-POLYVALENT PNEUMOCOCCAL POLYSACCHARIDE VACCINE: ICD-10-CM

## 2018-04-03 DIAGNOSIS — E78.2 MIXED HYPERLIPIDEMIA: ICD-10-CM

## 2018-04-03 DIAGNOSIS — E55.9 VITAMIN D DEFICIENCY: ICD-10-CM

## 2018-04-03 PROCEDURE — G0402 INITIAL PREVENTIVE EXAM: HCPCS | Performed by: INTERNAL MEDICINE

## 2018-04-03 RX ORDER — CEPHALEXIN 500 MG/1
500 CAPSULE ORAL 4 TIMES DAILY
COMMUNITY
End: 2018-04-03

## 2018-04-03 RX ORDER — MAGNESIUM OXIDE 400 MG/1
400 TABLET ORAL DAILY
COMMUNITY
End: 2018-04-03

## 2018-04-03 ASSESSMENT — PAIN SCALES - GENERAL: PAINLEVEL: NO PAIN

## 2018-04-03 ASSESSMENT — PATIENT HEALTH QUESTIONNAIRE - PHQ9: CLINICAL INTERPRETATION OF PHQ2 SCORE: 0

## 2018-04-03 ASSESSMENT — ACTIVITIES OF DAILY LIVING (ADL): BATHING_REQUIRES_ASSISTANCE: 0

## 2018-04-03 NOTE — PROGRESS NOTES
Chief Complaint   Patient presents with   • Annual Wellness Visit     SCP         HPI:  Jhony is a 67 y.o. here for Medicare Annual Wellness Visit         Patient Active Problem List    Diagnosis Date Noted   • S/P RF ablation operation for arrhythmia-2008; no recurrence; no meds 12/10/2010     Priority: Medium   • Benign nodular prostatic hyperplasia with lower urinary tract symptoms- controlled with prn tamsulosin 2-3x/wk 01/19/2017     Priority: Low   • Obesity (BMI 30-39.9) 02/13/2018   • Need for 23-polyvalent pneumococcal polysaccharide vaccine- tbd fall 2018 09/14/2017   • Prophylactic antibiotic- for dental work due to TKRs 09/14/2017   • Vitamin D deficiency 09/14/2017   • Obesity (BMI 35.0-39.9 without comorbidity) 02/22/2017   • Iron deficiency- due to gastric bypass 01/19/2017   • Amnesia, global, transient- 2008, resolved; no recurrence; possibly due to due to medication interaction- celebrex and mobic   01/19/2017   • Vitamin B 12 deficiency- due to gastric bypass 06/20/2016   • History of obstructive sleep apnea- resolved with weight loss 06/20/2016   •  Mixed hyperlipidemia-mild, no meds, diet and exercise controlled 06/17/2016   • Gastric bypass status for obesity-2005 dr gallo 11/19/2012       Current Outpatient Prescriptions   Medication Sig Dispense Refill   • magnesium oxide (MAG-OX) 400 MG Tab Take 400 mg by mouth every day.     • cephALEXin (KEFLEX) 500 MG Cap Take 500 mg by mouth 4 times a day.     • Cholecalciferol (VITAMIN D3) 2000 UNIT Cap Take 1 Cap by mouth See Admin Instructions. 2 times a week     • Cyanocobalamin (B-12) 1000 MCG Tab CR Take 1 Tab by mouth See Admin Instructions. 2 times a week     • Ferrous Sulfate Dried (SLOW RELEASE IRON) 45 MG Tab CR Take 1 Tab by mouth See Admin Instructions. 2 times a week     • naproxen (ALEVE) 220 MG tablet Take 220 mg by mouth as needed.     • tamsulosin (FLOMAX) 0.4 MG capsule Take 1 Cap by mouth ONE-HALF HOUR AFTER BREAKFAST. 90 Cap 4   •  Cholecalciferol 1000 UNIT Cap Take 2 Caps by mouth See Admin Instructions. 2 times a week (Patient not taking: Reported on 4/3/2018) 100 Cap 4     No current facility-administered medications for this visit.         Patient is taking medications as noted in medication list.  Current supplements as per medication list.     Allergies: Meloxicam and Penicillins    Current social contact/activities: spend time with family    Patient's perception of their health: good    Is patient current with immunizations? No, due for ZOSTAVAX (Shingles). Patient is interested in receiving NONE today.    He  reports that he quit smoking about 6 years ago. His smoking use included Cigars. He quit after 3.00 years of use. He has never used smokeless tobacco. He reports that he drinks alcohol. He reports that he does not use drugs.  Counseling given: Not Answered        DPA/Advanced directive: Patient has Advanced Directive, but it is not on file. Instructed to bring in a copy to scan into their chart.    ROS:    Gait: Uses no assistive device    Ostomy: no     Other tubes: no    Amputations: no    Chronic oxygen use no     Last eye exam 7/2017    Wears hearing aids: no    : Denies any urinary leakage during the last 6 months       Screening:          Depression Screening    Little interest or pleasure in doing things?  0 - not at all  Feeling down, depressed, or hopeless? 0 - not at all  Patient Health Questionnaire Score: 0    If depressive symptoms identified deferred to follow up visit unless specifically addressed in assessment and plan.    Interpretation of PHQ-9 Total Score   Score Severity   1-4 No Depression   5-9 Mild Depression   10-14 Moderate Depression   15-19 Moderately Severe Depression   20-27 Severe Depression    Screening for Cognitive Impairment    Three Minute Recall (apple, watch, terese)  3/3 Banana sunrise fence  Draw clock face with all 12 numbers set to the hand to show 10 minutes past 11 o'clock  0  Spacing  4/5  If cognitive concerns identified, deferred for follow up unless specifically addressed in assessment and plan.    Fall Risk Assessment    Has the patient had two or more falls in the last year or any fall with injury in the last year?  No  If fall risk identified, deferred for follow up unless specifically addressed in assessment and plan.    Safety Assessment    Throw rugs on floor.  Yes  Handrails on all stairs.  Yes  Good lighting in all hallways.  Yes  Difficulty hearing.  No  Patient counseled about all safety risks that were identified.    Functional Assessment ADLs    Are there any barriers preventing you from cooking for yourself or meeting nutritional needs?  No.    Are there any barriers preventing you from driving safely or obtaining transportation?  No.    Are there any barriers preventing you from using a telephone or calling for help?  No.    Are there any barriers preventing you from shopping?  No.    Are there any barriers preventing you from taking care of your own finances?  No.    Are there any barriers preventing you from managing your medications?  No.    Are there any barriers preventing you from showering, bathing or dressing yourself?  No.    Are you currently engaging any exercise or physical activity?  Yes.  Gym 3 times a week    Health Maintenance Summary                Annual Wellness Visit Overdue 1950     IMM ZOSTER VACCINE Overdue 7/19/2012     COLONOSCOPY Next Due 9/12/2018      Done 9/12/2008 REFERRAL TO GI FOR COLONOSCOPY    IMM PNEUMOCOCCAL 65+ (ADULT) LOW/MEDIUM RISK SERIES Next Due 9/14/2018      Done 9/14/2017 Imm Admin: Pneumococcal Conjugate Vaccine (Prevnar/PCV-13)    IMM DTaP/Tdap/Td Vaccine Next Due 3/14/2027      Done 3/14/2017 Imm Admin: Tdap Vaccine          Patient Care Team:  Reinlado Fenton M.D. as PCP - General (Internal Medicine)    Social History   Substance Use Topics   • Smoking status: Former Smoker     Years: 3.00     Types: Cigars     Quit  "date: 1/2/2012   • Smokeless tobacco: Never Used      Comment: occl cigar   • Alcohol use Yes      Comment: 1 per week     Family History   Problem Relation Age of Onset   • Cancer Maternal Aunt    • Cancer Maternal Uncle    • Cancer Maternal Grandfather    • Thyroid Mother    • Heart Disease Mother    • Heart Disease Brother      He  has a past medical history of Arthritis; H/O cardiac radiofrequency ablation (2006); and Snoring. He also has no past medical history of Anesthesia.   Past Surgical History:   Procedure Laterality Date   • KNEE ARTHROPLASTY TOTAL Left 5/5/2015    Procedure: KNEE ARTHROPLASTY TOTAL ;  Surgeon: Mike Michele M.D.;  Location: SURGERY [facility];  Service:    • KNEE ARTHROPLASTY TOTAL  1/6/2015    Performed by Mike Michele M.D. at SURGERY [facility]   • INGUINAL HERNIA REPAIR  8/16/2013    Performed by Martin Dillon M.D. at SURGERY [facility]   • OTHER ORTHOPEDIC SURGERY  2006    left knee meniscectomy; dr rhodes   • GASTRIC BYPASS LAPAROSCOPIC  2004    dr. gallo   • LAPAROTOMY  2004    bowel perforation repair, following gastric  bypass           Exam:     Blood pressure 124/62, pulse 71, temperature 36.8 °C (98.2 °F), height 1.918 m (6' 3.5\"), weight (!) 146.3 kg (322 lb 9.6 oz), SpO2 95 %. Body mass index is 39.79 kg/m².    Hearing excellent.    Dentition good  Alert, oriented in no acute distress.  Eye contact is good, speech goal directed, affect calm       Assessment and Plan. The following treatment and monitoring plan is recommended:      1. Medicare annual wellness visit, subsequent         - Initial Wellness Visit - Includes PPPS ()    2. S/P RF ablation operation for arrhythmia-2008; no recurrence; no meds   Under good control. Continue same regimen.    - Initial Wellness Visit - Includes PPPS ()    3. Gastric bypass status for obesity-2005 dr gallo     Under good control. Continue same regimen.  - Initial Wellness Visit - Includes PPPS " ()    4.  Mixed hyperlipidemia-mild, no meds, diet and exercise controlled    Under good control. Continue same regimen.  - COMP METABOLIC PANEL; Future  - LIPID PROFILE; Future  - CBC WITH DIFFERENTIAL; Future  - Initial Wellness Visit - Includes PPPS ()    5. Vitamin B 12 deficiency- due to gastric bypass     Under good control. Continue same regimen.  - Initial Wellness Visit - Includes PPPS ()    6. History of obstructive sleep apnea- resolved with weight loss     Under good control. Continue same regimen.  - Initial Wellness Visit - Includes PPPS ()    7. Iron deficiency- due to gastric bypass    Under good control. Continue same regimen.  - Initial Wellness Visit - Includes PPPS ()    8. Benign nodular prostatic hyperplasia with lower urinary tract symptoms- controlled with prn tamsulosin 2-3x/wk     - Initial Wellness Visit - Includes PPPS ()    9. Amnesia, global, transient- 2008, resolved; no recurrence; possibly due to due to medication interaction- celebrex and mobic      Under good control. Continue same regimen.  - Initial Wellness Visit - Includes PPPS ()    10. Need for 23-polyvalent pneumococcal polysaccharide vaccine- tbd fall 2018    Under good control. Continue same regimen.  - Initial Wellness Visit - Includes PPPS ()    11. Prophylactic antibiotic- for dental work due to TKRs    Under good control. Continue same regimen.  - Initial Wellness Visit - Includes PPPS ()    12. Vitamin D deficiency    Under good control. Continue same regimen.- VITAMIN D,25 HYDROXY; Future  - Initial Wellness Visit - Includes PPPS ()    13. Obesity (BMI 30-39.9)    diet/exercise/lose 15 lbs.; patient counseled    - Patient identified as having weight management issue.  Appropriate orders and counseling given.  - Initial Wellness Visit - Includes PPPS ()    14. Hypogonadism in male  Not at goal   diet/exercise/lose 15 lbs.; patient counseled  TESTOSTERONE, FREE AND  TOTAL  - Initial Wellness Visit - Includes PPPS ()         Services suggested: No services needed at this time  Health Care Screening: Age-appropriate preventive services recommended by USPTF and ACIP covered by Medicare were discussed today. Services ordered if indicated and agreed upon by the patient.  Referrals offered: PT/OT/Nutrition counseling/Behavioral Health/Smoking cessation as per orders if indicated.    Discussion today about general wellness and lifestyle habits:    · Prevent falls and reduce trip hazards; Cautioned about securing or removing rugs.  · Have a working fire alarm and carbon monoxide detector;   · Engage in regular physical activity and social activities       Follow-up: No Follow-up on file.

## 2018-07-10 ENCOUNTER — OFFICE VISIT (OUTPATIENT)
Dept: MEDICAL GROUP | Age: 68
End: 2018-07-10
Payer: MEDICARE

## 2018-07-10 ENCOUNTER — HOSPITAL ENCOUNTER (OUTPATIENT)
Facility: MEDICAL CENTER | Age: 68
End: 2018-07-10
Attending: INTERNAL MEDICINE
Payer: MEDICARE

## 2018-07-10 VITALS
OXYGEN SATURATION: 95 % | WEIGHT: 315 LBS | HEIGHT: 76 IN | TEMPERATURE: 99.2 F | HEART RATE: 66 BPM | DIASTOLIC BLOOD PRESSURE: 86 MMHG | BODY MASS INDEX: 38.36 KG/M2 | SYSTOLIC BLOOD PRESSURE: 128 MMHG

## 2018-07-10 DIAGNOSIS — N40.1 BENIGN NODULAR PROSTATIC HYPERPLASIA WITH LOWER URINARY TRACT SYMPTOMS: ICD-10-CM

## 2018-07-10 DIAGNOSIS — R35.0 URINARY FREQUENCY: ICD-10-CM

## 2018-07-10 DIAGNOSIS — E61.1 IRON DEFICIENCY: ICD-10-CM

## 2018-07-10 DIAGNOSIS — E55.9 VITAMIN D DEFICIENCY: ICD-10-CM

## 2018-07-10 DIAGNOSIS — N41.0 ACUTE PROSTATITIS WITHOUT HEMATURIA: ICD-10-CM

## 2018-07-10 DIAGNOSIS — R39.15 URGENCY OF URINATION: ICD-10-CM

## 2018-07-10 DIAGNOSIS — E78.2 MIXED HYPERLIPIDEMIA: ICD-10-CM

## 2018-07-10 DIAGNOSIS — R35.1 NOCTURIA: ICD-10-CM

## 2018-07-10 DIAGNOSIS — E66.9 OBESITY (BMI 30-39.9): ICD-10-CM

## 2018-07-10 PROCEDURE — 99214 OFFICE O/P EST MOD 30 MIN: CPT | Performed by: INTERNAL MEDICINE

## 2018-07-10 PROCEDURE — 87086 URINE CULTURE/COLONY COUNT: CPT

## 2018-07-10 RX ORDER — SULFAMETHOXAZOLE AND TRIMETHOPRIM 800; 160 MG/1; MG/1
1 TABLET ORAL 2 TIMES DAILY
Qty: 30 TAB | Refills: 1 | Status: SHIPPED | OUTPATIENT
Start: 2018-07-10 | End: 2019-02-07 | Stop reason: SDUPTHER

## 2018-07-10 ASSESSMENT — ENCOUNTER SYMPTOMS
GASTROINTESTINAL NEGATIVE: 1
EYES NEGATIVE: 1
CARDIOVASCULAR NEGATIVE: 1
RESPIRATORY NEGATIVE: 1
MUSCULOSKELETAL NEGATIVE: 1
CONSTITUTIONAL NEGATIVE: 1
PSYCHIATRIC NEGATIVE: 1
NEUROLOGICAL NEGATIVE: 1

## 2018-07-11 LAB
AMBIGUOUS DTTM AMBI4: NORMAL
SIGNIFICANT IND 70042: NORMAL
SITE SITE: NORMAL
SOURCE SOURCE: NORMAL

## 2018-07-11 NOTE — PROGRESS NOTES
Subjective:      Jhony Rodriguez is a 67 y.o. male who presents with UTI (x2 weeks)  and   The patient is here for followup of chronic medical problems listed below. The patient is compliant with medications and having no side effects from them. Denies chest pain, abdominal pain, dyspnea, myalgias, or cough.   Patient Active Problem List    Diagnosis Date Noted   • S/P RF ablation operation for arrhythmia-2008; no recurrence; no meds 12/10/2010     Priority: Medium   • Benign nodular prostatic hyperplasia with lower urinary tract symptoms- controlled with prn tamsulosin 2-3x/wk 01/19/2017     Priority: Low   • Obesity (BMI 30-39.9) 02/13/2018   • Need for 23-polyvalent pneumococcal polysaccharide vaccine- tbd fall 2018 09/14/2017   • Prophylactic antibiotic- for dental work due to TKRs 09/14/2017   • Vitamin D deficiency 09/14/2017   • Iron deficiency- due to gastric bypass 01/19/2017   • Amnesia, global, transient- 2008, resolved; no recurrence; possibly due to due to medication interaction- celebrex and mobic   01/19/2017   • Vitamin B 12 deficiency- due to gastric bypass 06/20/2016   • History of obstructive sleep apnea- resolved with weight loss 06/20/2016   •  Mixed hyperlipidemia-mild, no meds, diet and exercise controlled 06/17/2016   • Gastric bypass status for obesity-2005 dr gallo 11/19/2012     Allergies   Allergen Reactions   • Meloxicam Rash     alterred mentation   • Penicillins Rash     Rash  Tolerated cefazolin 5/5/15       ,  Outpatient Medications Prior to Visit   Medication Sig Dispense Refill   • Cholecalciferol (VITAMIN D3) 2000 UNIT Cap Take 1 Cap by mouth See Admin Instructions. 2 times a week     • Cyanocobalamin (B-12) 1000 MCG Tab CR Take 1 Tab by mouth See Admin Instructions. 2 times a week     • Ferrous Sulfate Dried (SLOW RELEASE IRON) 45 MG Tab CR Take 1 Tab by mouth See Admin Instructions. 2 times a week     • tamsulosin (FLOMAX) 0.4 MG capsule Take 1 Cap by mouth ONE-HALF HOUR AFTER  "BREAKFAST. 90 Cap 4     No facility-administered medications prior to visit.              UTI         Review of Systems   Constitutional: Negative.    HENT: Negative.    Eyes: Negative.    Respiratory: Negative.    Cardiovascular: Negative.    Gastrointestinal: Negative.    Genitourinary: Positive for dysuria, frequency and urgency.   Musculoskeletal: Negative.    Skin: Negative.    Neurological: Negative.    Endo/Heme/Allergies: Negative.    Psychiatric/Behavioral: Negative.           Objective:     /86   Pulse 66   Temp 37.3 °C (99.2 °F)   Ht 1.918 m (6' 3.51\")   Wt (!) 144.2 kg (318 lb)   SpO2 95%   BMI 39.21 kg/m²      Physical Exam   Constitutional: He is oriented to person, place, and time. He appears well-developed and well-nourished. No distress.   HENT:   Head: Normocephalic and atraumatic.   Right Ear: External ear normal.   Left Ear: External ear normal.   Nose: Nose normal.   Mouth/Throat: Oropharynx is clear and moist. No oropharyngeal exudate.   Eyes: Conjunctivae and EOM are normal. Pupils are equal, round, and reactive to light. Right eye exhibits no discharge. Left eye exhibits no discharge. No scleral icterus.   Neck: Normal range of motion. Neck supple. No JVD present. No tracheal deviation present. No thyromegaly present.   Cardiovascular: Normal rate, regular rhythm, normal heart sounds and intact distal pulses.  Exam reveals no gallop and no friction rub.    No murmur heard.  Pulmonary/Chest: Effort normal and breath sounds normal. No stridor. No respiratory distress. He has no wheezes. He has no rales. He exhibits no tenderness.   Abdominal: Soft. Bowel sounds are normal. He exhibits no distension and no mass. There is no tenderness. There is no rebound and no guarding.   Genitourinary: Penis normal.   Genitourinary Comments: No epididymal tenderness.  He does have bilateral hydroceles. Digital rectal exam showed only slight enlargement of the prostate with only very minimal " tenderness to palpation.  No nodules.   Musculoskeletal: Normal range of motion. He exhibits no edema or tenderness.   Lymphadenopathy:     He has no cervical adenopathy.   Neurological: He is alert and oriented to person, place, and time. He has normal reflexes. He displays normal reflexes. He exhibits normal muscle tone. Coordination normal.   Skin: Skin is warm and dry. No rash noted. He is not diaphoretic. No erythema. No pallor.   Psychiatric: He has a normal mood and affect. His behavior is normal. Judgment and thought content normal.     No visits with results within 1 Month(s) from this visit.   Latest known visit with results is:   Hospital Outpatient Visit on 02/09/2018   Component Date Value   • Prostatic Specific Antig* 02/09/2018 0.16    • Color 02/09/2018 Yellow    • Character 02/09/2018 Clear    • Specific Gravity 02/09/2018 1.026    • Ph 02/09/2018 5.5    • Glucose 02/09/2018 Negative    • Ketones 02/09/2018 Negative    • Protein 02/09/2018 Negative    • Bilirubin 02/09/2018 Negative    • Urobilinogen, Urine 02/09/2018 0.2    • Nitrite 02/09/2018 Negative    • Leukocyte Esterase 02/09/2018 Negative    • Occult Blood 02/09/2018 Negative    • Micro Urine Req 02/09/2018 see below    • Culture Indicated 02/09/2018 No    • TSH 02/09/2018 2.060    • Sodium 02/09/2018 140    • Potassium 02/09/2018 4.1    • Chloride 02/09/2018 109    • Co2 02/09/2018 27    • Anion Gap 02/09/2018 4.0    • Glucose 02/09/2018 118*   • Bun 02/09/2018 22    • Creatinine 02/09/2018 0.77    • Calcium 02/09/2018 8.8    • AST(SGOT) 02/09/2018 17    • ALT(SGPT) 02/09/2018 14    • Alkaline Phosphatase 02/09/2018 69    • Total Bilirubin 02/09/2018 0.5    • Albumin 02/09/2018 3.7    • Total Protein 02/09/2018 6.3    • Globulin 02/09/2018 2.6    • A-G Ratio 02/09/2018 1.4    • Cholesterol,Tot 02/09/2018 129    • Triglycerides 02/09/2018 48    • HDL 02/09/2018 58    • LDL 02/09/2018 61    • WBC 02/09/2018 5.9    • RBC 02/09/2018 5.34    •  Hemoglobin 02/09/2018 15.4    • Hematocrit 02/09/2018 48.2    • MCV 02/09/2018 90.3    • MCH 02/09/2018 28.8    • MCHC 02/09/2018 32.0*   • RDW 02/09/2018 42.9    • Platelet Count 02/09/2018 235    • MPV 02/09/2018 10.6    • Neutrophils-Polys 02/09/2018 80.80*   • Lymphocytes 02/09/2018 10.90*   • Monocytes 02/09/2018 4.40    • Eosinophils 02/09/2018 2.20    • Basophils 02/09/2018 1.00    • Immature Granulocytes 02/09/2018 0.70    • Nucleated RBC 02/09/2018 0.00    • Neutrophils (Absolute) 02/09/2018 4.80    • Lymphs (Absolute) 02/09/2018 0.65*   • Monos (Absolute) 02/09/2018 0.26    • Eos (Absolute) 02/09/2018 0.13    • Baso (Absolute) 02/09/2018 0.06    • Immature Granulocytes (a* 02/09/2018 0.04    • NRBC (Absolute) 02/09/2018 0.00    • Vitamin B12 -True Cobala* 02/09/2018 363    • Folate -Folic Acid 02/09/2018 11.9    • Iron 02/09/2018 64    • Total Iron Binding 02/09/2018 407    • % Saturation 02/09/2018 16    • Ferritin 02/09/2018 25.5    • 25-Hydroxy   Vitamin D 25 02/09/2018 18*   • Testosterone,Total 02/09/2018 430    • Sex Hormone Bind Globulin 02/09/2018 114*   • Free Testosterone 02/09/2018 33*   • Testosterone % Free 02/09/2018 0.8*   • GFR If  02/09/2018 >60    • GFR If Non  Ameri* 02/09/2018 >60       ,  Lab Results   Component Value Date/Time    HBA1C 5.5 02/05/2018 01:38 PM     Lab Results   Component Value Date/Time    SODIUM 140 02/09/2018 10:32 AM    POTASSIUM 4.1 02/09/2018 10:32 AM    CHLORIDE 109 02/09/2018 10:32 AM    CO2 27 02/09/2018 10:32 AM    GLUCOSE 118 (H) 02/09/2018 10:32 AM    BUN 22 02/09/2018 10:32 AM    CREATININE 0.77 02/09/2018 10:32 AM    CREATININE 0.77 12/06/2010 08:03 AM    BUNCREATRAT 31 (H) 06/21/2016 09:57 AM    BUNCREATRAT 36 (H) 12/06/2010 08:03 AM    GLOMRATE >59 12/06/2010 08:03 AM    ALKPHOSPHAT 69 02/09/2018 10:32 AM    ASTSGOT 17 02/09/2018 10:32 AM    ALTSGPT 14 02/09/2018 10:32 AM    TBILIRUBIN 0.5 02/09/2018 10:32 AM     Lab Results    Component Value Date/Time    INR 1.07 02/05/2018 01:38 PM    INR 1.03 10/21/2013 12:12 PM    INR 1.51 (H) 06/12/2008 06:30 AM     Lab Results   Component Value Date/Time    CHOLSTRLTOT 129 02/09/2018 10:32 AM    LDL 61 02/09/2018 10:32 AM    HDL 58 02/09/2018 10:32 AM    TRIGLYCERIDE 48 02/09/2018 10:32 AM       Lab Results   Component Value Date/Time    TESTOSTERONE 430 02/09/2018 10:32 AM     Lab Results   Component Value Date/Time    TSH 2.700 06/21/2016 09:57 AM    TSH 3.190 07/11/2013 07:33 AM     Lab Results   Component Value Date/Time    FREET4 0.81 07/07/2014 09:54 AM    FREET4 0.89 10/21/2013 12:12 PM     No results found for: URICACID  No components found for: VITB12  Lab Results   Component Value Date/Time    25HYDROXY 18 (L) 02/09/2018 10:32 AM    25HYDROXY 23 (L) 07/07/2014 09:54 AM                 Assessment/Plan:     1. Urgency of urination-probably secondary to prostatitis.  His dipstick UA was negative, although this could be explained by recent Cleocin usage provided to him by a friend.      - REFERRAL TO UROLOGY  - URINE CULTURE(NEW); Future    2. Urinary frequency-as above.     - REFERRAL TO UROLOGY  - URINE CULTURE(NEW); Future    3. Nocturia-as above.  Continue Flomax.     - REFERRAL TO UROLOGY  - URINE CULTURE(NEW); Future    4. Acute prostatitis without hematuria-as above.      - REFERRAL TO UROLOGY  - URINE CULTURE(NEW); Future  - sulfamethoxazole-trimethoprim (BACTRIM DS) 800-160 MG tablet; Take 1 Tab by mouth 2 times a day.  Dispense: 30 Tab; Refill: 1    5. Benign nodular prostatic hyperplasia with lower urinary tract symptoms- controlled with prn tamsulosin 2-3x/wk         Under good control. Continue same regimen    .6.  Mixed hyperlipidemia-mild, no meds, diet and exercise controlled   Under good control. Continue same regimen.  7. Vitamin D deficiency   Under good control. Continue same regimen.  8. Obesity (BMI 30-39.9)    diet/exercise/lose 15 lbs.; patient counseled      9. Iron  deficiency- due to gastric bypass   Under good control. Continue same regimen.        30 minute face-to-face encounter took place today.  More than half of this time was spent in the coordination of care of the above problems, as well as counseling.

## 2018-07-13 LAB
BACTERIA UR CULT: NORMAL
SIGNIFICANT IND 70042: NORMAL
SITE SITE: NORMAL
SOURCE SOURCE: NORMAL

## 2018-10-08 ENCOUNTER — IMMUNIZATION (OUTPATIENT)
Dept: SOCIAL WORK | Facility: CLINIC | Age: 68
End: 2018-10-08
Payer: MEDICARE

## 2018-10-08 DIAGNOSIS — Z23 NEED FOR VACCINATION: ICD-10-CM

## 2018-10-08 PROCEDURE — G0008 ADMIN INFLUENZA VIRUS VAC: HCPCS | Performed by: REGISTERED NURSE

## 2018-10-08 PROCEDURE — 90662 IIV NO PRSV INCREASED AG IM: CPT | Performed by: REGISTERED NURSE

## 2018-10-10 RX ORDER — TAMSULOSIN HYDROCHLORIDE 0.4 MG/1
0.4 CAPSULE ORAL
Qty: 90 CAP | Refills: 4 | Status: SHIPPED | OUTPATIENT
Start: 2018-10-10 | End: 2019-12-08 | Stop reason: SDUPTHER

## 2019-01-31 ENCOUNTER — HOSPITAL ENCOUNTER (OUTPATIENT)
Facility: MEDICAL CENTER | Age: 69
End: 2019-01-31
Attending: INTERNAL MEDICINE
Payer: MEDICARE

## 2019-01-31 LAB — H PYLORI AG STL QL IA: NOT DETECTED

## 2019-01-31 PROCEDURE — 87338 HPYLORI STOOL AG IA: CPT

## 2019-02-07 DIAGNOSIS — N41.0 ACUTE PROSTATITIS WITHOUT HEMATURIA: ICD-10-CM

## 2019-02-08 RX ORDER — SULFAMETHOXAZOLE AND TRIMETHOPRIM 800; 160 MG/1; MG/1
TABLET ORAL
Qty: 90 TAB | Refills: 4 | Status: SHIPPED | OUTPATIENT
Start: 2019-02-08 | End: 2020-04-08

## 2019-04-30 ENCOUNTER — PATIENT OUTREACH (OUTPATIENT)
Dept: HEALTH INFORMATION MANAGEMENT | Facility: OTHER | Age: 69
End: 2019-04-30

## 2019-04-30 NOTE — PROGRESS NOTES
Outcome: Left voicemail/ message    Please transfer to [facility]  [phone] when patient returns call.     WebIZ Checked & Epic Updated:  yes     [facility] Verified: yes     Attempt # 1

## 2019-05-22 ENCOUNTER — OFFICE VISIT (OUTPATIENT)
Dept: MEDICAL GROUP | Age: 69
End: 2019-05-22
Payer: MEDICARE

## 2019-05-22 VITALS
OXYGEN SATURATION: 97 % | HEART RATE: 69 BPM | TEMPERATURE: 98.8 F | HEIGHT: 76 IN | WEIGHT: 315 LBS | SYSTOLIC BLOOD PRESSURE: 132 MMHG | BODY MASS INDEX: 38.36 KG/M2 | DIASTOLIC BLOOD PRESSURE: 68 MMHG

## 2019-05-22 DIAGNOSIS — E29.1 HYPOGONADISM IN MALE: ICD-10-CM

## 2019-05-22 DIAGNOSIS — E66.01 MORBID OBESITY WITH BMI OF 40.0-44.9, ADULT (HCC): ICD-10-CM

## 2019-05-22 DIAGNOSIS — G89.29 CHRONIC RIGHT HIP PAIN: ICD-10-CM

## 2019-05-22 DIAGNOSIS — E78.2 MIXED HYPERLIPIDEMIA: ICD-10-CM

## 2019-05-22 DIAGNOSIS — E55.9 VITAMIN D DEFICIENCY: ICD-10-CM

## 2019-05-22 DIAGNOSIS — E66.9 OBESITY (BMI 30-39.9): ICD-10-CM

## 2019-05-22 DIAGNOSIS — M25.551 CHRONIC RIGHT HIP PAIN: ICD-10-CM

## 2019-05-22 DIAGNOSIS — Z00.00 MEDICARE ANNUAL WELLNESS VISIT, SUBSEQUENT: ICD-10-CM

## 2019-05-22 DIAGNOSIS — N40.1 BENIGN NODULAR PROSTATIC HYPERPLASIA WITH LOWER URINARY TRACT SYMPTOMS: ICD-10-CM

## 2019-05-22 DIAGNOSIS — E61.1 IRON DEFICIENCY: ICD-10-CM

## 2019-05-22 DIAGNOSIS — Z12.5 SCREENING FOR PROSTATE CANCER: ICD-10-CM

## 2019-05-22 DIAGNOSIS — E53.8 VITAMIN B 12 DEFICIENCY: ICD-10-CM

## 2019-05-22 PROCEDURE — G0439 PPPS, SUBSEQ VISIT: HCPCS | Performed by: INTERNAL MEDICINE

## 2019-05-22 PROCEDURE — 8041 PR SCP AHA: Performed by: INTERNAL MEDICINE

## 2019-05-22 ASSESSMENT — ENCOUNTER SYMPTOMS: GENERAL WELL-BEING: GOOD

## 2019-05-22 ASSESSMENT — PATIENT HEALTH QUESTIONNAIRE - PHQ9: CLINICAL INTERPRETATION OF PHQ2 SCORE: 0

## 2019-05-22 ASSESSMENT — ACTIVITIES OF DAILY LIVING (ADL): BATHING_REQUIRES_ASSISTANCE: 0

## 2019-05-22 NOTE — PROGRESS NOTES
Chief Complaint   Patient presents with   • Annual Wellness Visit     AWV [facility]          HPI:  Jhony Rodriguez is a 68 y.o. here for Medicare Annual Wellness Visit.    Patient Active Problem List    Diagnosis Date Noted   • S/P RF ablation operation for arrhythmia-2008; no recurrence; no meds 12/10/2010     Priority: Medium   • Benign nodular prostatic hyperplasia with lower urinary tract symptoms- controlled with prn tamsulosin 2-3x/wk 01/19/2017     Priority: Low   • Hypogonadism in male 05/22/2019   • Obesity (BMI 30-39.9) 02/13/2018   • Need for 23-polyvalent pneumococcal polysaccharide vaccine- tbd fall 2018 09/14/2017   • Prophylactic antibiotic- for dental work due to TKRs 09/14/2017   • Vitamin D deficiency 09/14/2017   • Iron deficiency- due to gastric bypass 01/19/2017   • Amnesia, global, transient- 2008, resolved; no recurrence; possibly due to due to medication interaction- celebrex and mobic   01/19/2017   • Vitamin B 12 deficiency- due to gastric bypass 06/20/2016   • History of obstructive sleep apnea- resolved with weight loss 06/20/2016   •  Mixed hyperlipidemia-mild, no meds, diet and exercise controlled 06/17/2016   • Gastric bypass status for obesity-2005 dr gallo 11/19/2012       Current Outpatient Prescriptions   Medication Sig Dispense Refill   • sulfamethoxazole-trimethoprim (BACTRIM DS) 800-160 MG tablet TAKE 1 TABLET BY MOUTH TWICE A DAY 90 Tab 4   • tamsulosin (FLOMAX) 0.4 MG capsule Take 1 Cap by mouth ONE-HALF HOUR AFTER BREAKFAST. 90 Cap 4   • Cholecalciferol (VITAMIN D3) 2000 UNIT Cap Take 1 Cap by mouth See Admin Instructions. 2 times a week     • Cyanocobalamin (B-12) 1000 MCG Tab CR Take 1 Tab by mouth See Admin Instructions. 2 times a week     • Ferrous Sulfate Dried (SLOW RELEASE IRON) 45 MG Tab CR Take 1 Tab by mouth See Admin Instructions. 2 times a week       No current facility-administered medications for this visit.             Current supplements as per medication list.        Allergies: Meloxicam and Penicillins    Current social contact/activities: spend time with grandkids     He  reports that he quit smoking about 7 years ago. His smoking use included Cigars. He quit after 3.00 years of use. He has never used smokeless tobacco. He reports that he drinks alcohol. He reports that he does not use drugs.  Counseling given: Not Answered      DPA/Advanced Directive:  Patient has Durable Power of , but it is not on file. Instructed to bring in a copy to scan into their chart.    ROS:    Gait: Uses no assistive device  Ostomy: No  Other tubes: No  Amputations: No  Chronic oxygen use: No  Last eye exam: 2018  Wears hearing aids: No   : Denies any urinary leakage during the last 6 months    Screening:     Depression Screening    Little interest or pleasure in doing things?  0 - not at all  Feeling down, depressed , or hopeless? 0 - not at all  Patient Health Questionnaire Score: 0     If depressive symptoms identified deferred to follow up visit unless specifically addressed in assessment and plan.    Interpretation of PHQ-9 Total Score   Score Severity   1-4 No Depression   5-9 Mild Depression   10-14 Moderate Depression   15-19 Moderately Severe Depression   20-27 Severe Depression    Screening for Cognitive Impairment    Three Minute Recall (village, kitchen, baby) 3/3    Jose clock face with all 12 numbers and set the hands to show 10 past 10.  Yes    Cognitive concerns identified deferred for follow up unless specifically addressed in assessment and plan.    Fall Risk Assessment    Has the patient had two or more falls in the last year or any fall with injury in the last year?  No    Safety Assessment    Throw rugs on floor.  Yes  Handrails on all stairs.  No  Good lighting in all hallways.  Yes  Difficulty hearing.  No  Patient counseled about all safety risks that were identified.    Functional Assessment ADLs    Are there any barriers preventing you from cooking for  yourself or meeting nutritional needs?  No.    Are there any barriers preventing you from driving safely or obtaining transportation?  No.    Are there any barriers preventing you from using a telephone or calling for help?  No.    Are there any barriers preventing you from shopping?  No.    Are there any barriers preventing you from taking care of your own finances?  No.    Are there any barriers preventing you from managing your medications?  No.    Are there any barriers preventing you from showering, bathing or dressing yourself?  No.    Are you currently engaging in any exercise or physical activity?  Yes.  Gym 3 times weekly  What is your perception of your health?  Good.      Health Maintenance Summary                IMM ZOSTER VACCINES Overdue 7/19/2002     IMM PNEUMOCOCCAL 65+ (ADULT) LOW/MEDIUM RISK SERIES Overdue 9/14/2018      Done 9/14/2017 Imm Admin: Pneumococcal Conjugate Vaccine (Prevnar/PCV-13)    COLONOSCOPY Next Due 1/25/2024      Done 1/25/2019 REFERRAL TO GI FOR COLONOSCOPY     Patient has more history with this topic...    IMM DTaP/Tdap/Td Vaccine Next Due 3/14/2027      Done 3/14/2017 Imm Admin: Tdap Vaccine          Patient Care Team:  Reinaldo Fenton M.D. as PCP - General (Internal Medicine)        Social History   Substance Use Topics   • Smoking status: Former Smoker     Years: 3.00     Types: Cigars     Quit date: 1/2/2012   • Smokeless tobacco: Never Used      Comment: occl cigar   • Alcohol use Yes      Comment: 1 per week     Family History   Problem Relation Age of Onset   • Cancer Maternal Aunt    • Cancer Maternal Uncle    • Cancer Maternal Grandfather    • Thyroid Mother    • Heart Disease Mother    • Heart Disease Brother      He  has a past medical history of Arthritis; H/O cardiac radiofrequency ablation (2006); and Snoring. He also has no past medical history of Anesthesia.   Past Surgical History:   Procedure Laterality Date   • KNEE ARTHROPLASTY TOTAL Left 5/5/2015     "Procedure: KNEE ARTHROPLASTY TOTAL ;  Surgeon: Mike Michele M.D.;  Location: SURGERY [facility];  Service:    • KNEE ARTHROPLASTY TOTAL  1/6/2015    Performed by Mike Michele M.D. at SURGERY [facility]   • INGUINAL HERNIA REPAIR  8/16/2013    Performed by Martin Dillon M.D. at SURGERY [facility]   • OTHER ORTHOPEDIC SURGERY  2006    left knee meniscectomy; dr rhodes   • GASTRIC BYPASS LAPAROSCOPIC  2004    dr. gallo   • LAPAROTOMY  2004    bowel perforation repair, following gastric  bypass       Exam:   /68   Pulse 69   Temp 37.1 °C (98.8 °F)   Ht 1.918 m (6' 3.51\")   Wt (!) 147.2 kg (324 lb 9.6 oz)   SpO2 97%  Body mass index is 40.03 kg/m².    Hearing excellent.    Dentition good  Alert, oriented in no acute distress.  Eye contact is good, speech goal directed, affect calm    Assessment and Plan. The following treatment and monitoring plan is recommended:     1. Medicare annual wellness visit, subsequent   done    2.  Mixed hyperlipidemia-mild, no meds, diet and exercise controlled  Lipid panel completed on 2/9/18. Cholesterol 129; Triglycerides 48; HDL 58; LDL 61. Under good control. Continue same regimen of high protein, low carb diet. Plan to evaluate with:    - TSH; Future  - Comp Metabolic Panel; Future  - Lipid Profile; Future  - CBC WITH DIFFERENTIAL; Future    3. Vitamin B 12 deficiency- due to gastric bypass  Under good control. Continue same regimen of Vitamin B12 supplementation. Plan to evaluate with:    - VITAMIN B12; Future    4. Vitamin D deficiency  Under good control. Continue same regimen of Vitamin D supplementation. Plan to evaluate with:    - VITAMIN D,25 HYDROXY; Future    5.  Morbid obesity with BMI of 40.0-44.9, adult (HCC)  Patient counseled to diet, exercise, and lose 15 pounds.    6. Benign nodular prostatic hyperplasia with lower urinary tract symptoms- controlled with prn tamsulosin 2-3x/wk  Under good control. Continue same regimen of Tamsulosin " 0.4mg.    7. Chronic right hip pain  Hip pain for 6 months, worse on right. Interfering with patient's exercise regimen. Patient thinks it may be his arthritis acting up. Plan to further evaluate with bilateral hip x-rays and Sed rate.    - DX-HIP-BILATERAL-WITH PELVIS-3/4 VIEWS; Future  - WESTERGREN SED RATE; Future    8. Screening for prostate cancer  Plan to evaluate with PSA.    - PROSTATE SPECIFIC AG SCREENING; Future    9. Iron deficiency- due to gastric bypass  Under good control. Continue same regimen of Ferrous Sulfate 45mg. Plan to evaluate with:    - IRON/TOTAL IRON BIND; Future    10. Hypogonadism in male  Testerone on 2/9/18 was 430. Patient denies fatigue. Plan to reevaluate before next appointment.    - TESTOSTERONE, FREE AND TOTAL; Future        Services suggested: No services needed at this time    Health Care Screening: Age-appropriate preventive services recommended by USPTF and ACIP covered by Medicare were discussed today. Services ordered if indicated and agreed upon by the patient.  Referrals offered: Community-based lifestyle interventions to reduce health risks and promote self-management and wellness, fall prevention, nutrition, physical activity, tobacco-use cessation, weight loss, and mental health services as per orders if indicated.    Discussion today about general wellness and lifestyle habits:    · Prevent falls and reduce trip hazards; Cautioned about securing or removing rugs.  · Have a working fire alarm and carbon monoxide detector;   · Engage in regular physical activity and social activities     Follow-up: No Follow-up on file.        40 minute face-to-face encounter took place today.  More than half of this time was spent in the coordination of care of the above problems, as well as counseling.

## 2019-05-23 ENCOUNTER — HOSPITAL ENCOUNTER (OUTPATIENT)
Dept: RADIOLOGY | Facility: MEDICAL CENTER | Age: 69
End: 2019-05-23
Attending: INTERNAL MEDICINE
Payer: MEDICARE

## 2019-05-23 ENCOUNTER — HOSPITAL ENCOUNTER (OUTPATIENT)
Dept: LAB | Facility: MEDICAL CENTER | Age: 69
End: 2019-05-23
Attending: INTERNAL MEDICINE
Payer: MEDICARE

## 2019-05-23 DIAGNOSIS — G89.29 CHRONIC RIGHT HIP PAIN: ICD-10-CM

## 2019-05-23 DIAGNOSIS — E61.1 IRON DEFICIENCY: ICD-10-CM

## 2019-05-23 DIAGNOSIS — M25.551 CHRONIC RIGHT HIP PAIN: ICD-10-CM

## 2019-05-23 DIAGNOSIS — E78.2 MIXED HYPERLIPIDEMIA: ICD-10-CM

## 2019-05-23 DIAGNOSIS — E55.9 VITAMIN D DEFICIENCY: ICD-10-CM

## 2019-05-23 DIAGNOSIS — M16.11 PRIMARY OSTEOARTHRITIS OF RIGHT HIP: ICD-10-CM

## 2019-05-23 DIAGNOSIS — E53.8 VITAMIN B 12 DEFICIENCY: ICD-10-CM

## 2019-05-23 DIAGNOSIS — Z12.5 SCREENING FOR PROSTATE CANCER: ICD-10-CM

## 2019-05-23 LAB
25(OH)D3 SERPL-MCNC: 13 NG/ML (ref 30–100)
ALBUMIN SERPL BCP-MCNC: 4.3 G/DL (ref 3.2–4.9)
ALBUMIN/GLOB SERPL: 1.7 G/DL
ALP SERPL-CCNC: 77 U/L (ref 30–99)
ALT SERPL-CCNC: 19 U/L (ref 2–50)
ANION GAP SERPL CALC-SCNC: 6 MMOL/L (ref 0–11.9)
AST SERPL-CCNC: 20 U/L (ref 12–45)
BASOPHILS # BLD AUTO: 0.9 % (ref 0–1.8)
BASOPHILS # BLD: 0.05 K/UL (ref 0–0.12)
BILIRUB SERPL-MCNC: 0.7 MG/DL (ref 0.1–1.5)
BUN SERPL-MCNC: 23 MG/DL (ref 8–22)
CALCIUM SERPL-MCNC: 9.4 MG/DL (ref 8.5–10.5)
CHLORIDE SERPL-SCNC: 103 MMOL/L (ref 96–112)
CHOLEST SERPL-MCNC: 180 MG/DL (ref 100–199)
CO2 SERPL-SCNC: 30 MMOL/L (ref 20–33)
CREAT SERPL-MCNC: 0.77 MG/DL (ref 0.5–1.4)
EOSINOPHIL # BLD AUTO: 0.19 K/UL (ref 0–0.51)
EOSINOPHIL NFR BLD: 3.5 % (ref 0–6.9)
ERYTHROCYTE [DISTWIDTH] IN BLOOD BY AUTOMATED COUNT: 41.9 FL (ref 35.9–50)
ERYTHROCYTE [SEDIMENTATION RATE] IN BLOOD BY WESTERGREN METHOD: 3 MM/HOUR (ref 0–20)
FASTING STATUS PATIENT QL REPORTED: NORMAL
GLOBULIN SER CALC-MCNC: 2.5 G/DL (ref 1.9–3.5)
GLUCOSE SERPL-MCNC: 91 MG/DL (ref 65–99)
HCT VFR BLD AUTO: 50.5 % (ref 42–52)
HDLC SERPL-MCNC: 65 MG/DL
HGB BLD-MCNC: 15.6 G/DL (ref 14–18)
IMM GRANULOCYTES # BLD AUTO: 0.01 K/UL (ref 0–0.11)
IMM GRANULOCYTES NFR BLD AUTO: 0.2 % (ref 0–0.9)
IRON SATN MFR SERPL: 26 % (ref 15–55)
IRON SERPL-MCNC: 113 UG/DL (ref 50–180)
LDLC SERPL CALC-MCNC: 101 MG/DL
LYMPHOCYTES # BLD AUTO: 0.63 K/UL (ref 1–4.8)
LYMPHOCYTES NFR BLD: 11.6 % (ref 22–41)
MCH RBC QN AUTO: 27.9 PG (ref 27–33)
MCHC RBC AUTO-ENTMCNC: 30.9 G/DL (ref 33.7–35.3)
MCV RBC AUTO: 90.3 FL (ref 81.4–97.8)
MONOCYTES # BLD AUTO: 0.37 K/UL (ref 0–0.85)
MONOCYTES NFR BLD AUTO: 6.8 % (ref 0–13.4)
NEUTROPHILS # BLD AUTO: 4.2 K/UL (ref 1.82–7.42)
NEUTROPHILS NFR BLD: 77 % (ref 44–72)
NRBC # BLD AUTO: 0 K/UL
NRBC BLD-RTO: 0 /100 WBC
PLATELET # BLD AUTO: 230 K/UL (ref 164–446)
PMV BLD AUTO: 10.2 FL (ref 9–12.9)
POTASSIUM SERPL-SCNC: 4.5 MMOL/L (ref 3.6–5.5)
PROT SERPL-MCNC: 6.8 G/DL (ref 6–8.2)
PSA SERPL-MCNC: 0.25 NG/ML (ref 0–4)
RBC # BLD AUTO: 5.59 M/UL (ref 4.7–6.1)
SODIUM SERPL-SCNC: 139 MMOL/L (ref 135–145)
TIBC SERPL-MCNC: 438 UG/DL (ref 250–450)
TRIGL SERPL-MCNC: 68 MG/DL (ref 0–149)
TSH SERPL DL<=0.005 MIU/L-ACNC: 2.95 UIU/ML (ref 0.38–5.33)
VIT B12 SERPL-MCNC: 326 PG/ML (ref 211–911)
WBC # BLD AUTO: 5.5 K/UL (ref 4.8–10.8)

## 2019-05-23 PROCEDURE — 80061 LIPID PANEL: CPT

## 2019-05-23 PROCEDURE — 84153 ASSAY OF PSA TOTAL: CPT

## 2019-05-23 PROCEDURE — 83550 IRON BINDING TEST: CPT

## 2019-05-23 PROCEDURE — 85652 RBC SED RATE AUTOMATED: CPT

## 2019-05-23 PROCEDURE — 84270 ASSAY OF SEX HORMONE GLOBUL: CPT

## 2019-05-23 PROCEDURE — 82607 VITAMIN B-12: CPT

## 2019-05-23 PROCEDURE — 84403 ASSAY OF TOTAL TESTOSTERONE: CPT

## 2019-05-23 PROCEDURE — 73522 X-RAY EXAM HIPS BI 3-4 VIEWS: CPT

## 2019-05-23 PROCEDURE — 84443 ASSAY THYROID STIM HORMONE: CPT

## 2019-05-23 PROCEDURE — 80053 COMPREHEN METABOLIC PANEL: CPT

## 2019-05-23 PROCEDURE — 83540 ASSAY OF IRON: CPT

## 2019-05-23 PROCEDURE — 36415 COLL VENOUS BLD VENIPUNCTURE: CPT

## 2019-05-23 PROCEDURE — 85025 COMPLETE CBC W/AUTO DIFF WBC: CPT

## 2019-05-23 PROCEDURE — 82306 VITAMIN D 25 HYDROXY: CPT

## 2019-05-24 LAB
SHBG SERPL-SCNC: 125 NMOL/L (ref 11–80)
TESTOST FREE MFR SERPL: 0.7 % (ref 1.6–2.9)
TESTOST FREE SERPL-MCNC: 43 PG/ML (ref 47–244)
TESTOST SERPL-MCNC: 590 NG/DL (ref 300–720)

## 2019-05-24 NOTE — PROGRESS NOTES
You have severe osteoarthritis in your right hip which needs replacement.  Referral to orthopedic surgeon is indicated and was placed.  Discuss further options for treatment with him.  The referral is good for any orthopedic surgeon who takes your insurance.

## 2019-05-28 DIAGNOSIS — E29.1 HYPOGONADISM IN MALE: ICD-10-CM

## 2019-05-28 RX ORDER — TESTOSTERONE CYPIONATE 200 MG/ML
200 INJECTION, SOLUTION INTRAMUSCULAR
Status: DISCONTINUED | OUTPATIENT
Start: 2019-05-29 | End: 2020-04-02

## 2019-05-29 NOTE — PROGRESS NOTES
Testosterone level is low which might explain fatigue.  Would benefit from biweekly testosterone injections.  Ordered.  Can discuss further and administer testosterone on follow-up visit on 6/11/2019, or he can come in now to start injections..

## 2019-06-06 ENCOUNTER — TELEPHONE (OUTPATIENT)
Dept: MEDICAL GROUP | Age: 69
End: 2019-06-06

## 2019-06-11 ENCOUNTER — OFFICE VISIT (OUTPATIENT)
Dept: MEDICAL GROUP | Age: 69
End: 2019-06-11
Payer: MEDICARE

## 2019-06-11 VITALS
TEMPERATURE: 98.5 F | SYSTOLIC BLOOD PRESSURE: 104 MMHG | HEIGHT: 76 IN | WEIGHT: 315 LBS | OXYGEN SATURATION: 94 % | BODY MASS INDEX: 38.36 KG/M2 | DIASTOLIC BLOOD PRESSURE: 66 MMHG | HEART RATE: 61 BPM

## 2019-06-11 DIAGNOSIS — M16.11 PRIMARY OSTEOARTHRITIS OF RIGHT HIP: ICD-10-CM

## 2019-06-11 DIAGNOSIS — E78.2 MIXED HYPERLIPIDEMIA: ICD-10-CM

## 2019-06-11 DIAGNOSIS — E66.9 OBESITY (BMI 30-39.9): ICD-10-CM

## 2019-06-11 DIAGNOSIS — E61.1 IRON DEFICIENCY: ICD-10-CM

## 2019-06-11 DIAGNOSIS — E53.8 VITAMIN B 12 DEFICIENCY: ICD-10-CM

## 2019-06-11 DIAGNOSIS — E55.9 VITAMIN D DEFICIENCY: ICD-10-CM

## 2019-06-11 DIAGNOSIS — E29.1 HYPOGONADISM MALE: ICD-10-CM

## 2019-06-11 PROBLEM — E66.01 MORBID OBESITY WITH BMI OF 40.0-44.9, ADULT (HCC): Status: RESOLVED | Noted: 2019-05-22 | Resolved: 2019-06-11

## 2019-06-11 PROCEDURE — 96372 THER/PROPH/DIAG INJ SC/IM: CPT | Performed by: INTERNAL MEDICINE

## 2019-06-11 PROCEDURE — 99215 OFFICE O/P EST HI 40 MIN: CPT | Mod: 25 | Performed by: INTERNAL MEDICINE

## 2019-06-11 RX ORDER — TESTOSTERONE CYPIONATE 200 MG/ML
200 INJECTION, SOLUTION INTRAMUSCULAR
Status: DISCONTINUED | OUTPATIENT
Start: 2019-06-11 | End: 2020-04-02

## 2019-06-11 RX ADMIN — TESTOSTERONE CYPIONATE 200 MG: 200 INJECTION, SOLUTION INTRAMUSCULAR at 09:51

## 2019-06-11 ASSESSMENT — ENCOUNTER SYMPTOMS
CARDIOVASCULAR NEGATIVE: 1
MYALGIAS: 0
BACK PAIN: 0
EYES NEGATIVE: 1
RESPIRATORY NEGATIVE: 1
BRUISES/BLEEDS EASILY: 0
POLYDIPSIA: 0
NECK PAIN: 0
CONSTITUTIONAL NEGATIVE: 1
FALLS: 0
NEUROLOGICAL NEGATIVE: 1
GASTROINTESTINAL NEGATIVE: 1
PSYCHIATRIC NEGATIVE: 1

## 2019-06-11 NOTE — PROGRESS NOTES
Subjective:      Jhony Rodriguez is a 68 y.o. male who presents with Follow-Up and Hyperlipidemia        HPI    The patient is here for followup of chronic medical problems listed below. The patient is compliant with medications and having no side effects from them. Denies chest pain, abdominal pain, dyspnea, myalgias, or cough.    Osteoarthritis of right hip  Patient has known history of right hip pain. Hip X-Ray performed on 5/23/19 showed severe osteoarthritis arthritis to his right hip which will almost certainly require a replacement. Patient has an appointment with [facility] orthopedics in three days for consultation and evaluation.    Dyslipidemia  Patient is not taking medications and manages through his own  Lipid panel from 5/23/19 shows his LDL was borderline elevated at 101 and all other results were within normal limits.    Hypogonadism in male  Patients free testosterone was low at 43 and testosterone % free was low at 0.7. Total testosterone was normal at 590. Patient is requesting information on testosterone supplements for possible weight loss and energy increase.    Vitamin D Deficiency  Patient is taking cholecalciferol. Prior to lab results on 5/23/19 he was only taking 1000 units, however after the result came back low at 13, he has increased his dose to 2000 units. He is tolerating this well without side effects.    Vitamin B12 Deficiency  Patient is taking cyanocobalamin 1000 mcg, which he is tolerating well without side effects. Vitamin B12 level on lab results from 5/23/19 was normal at 326.    Obesity (BMI 30-39.9)  Patient's BMI is elevated at 39.98 as of today. He states that he is having diffuculty losing weight has he can no longer go to the gym due to his osteoarthritis.     Iron deficiency  Patient with known iron deficiency status post gastric bypass surgery. He is taking slow release dried ferous sulfate, which he is tolerating well without side effects. Hemoglobin,  Hematocrit , and Total Iron Binding are all within normal limits on 5/23/19 labs.      Patient Active Problem List   Diagnosis   • S/P RF ablation operation for arrhythmia-2008; no recurrence; no meds   • Gastric bypass status for obesity-2005 dr gallo   •  Mixed hyperlipidemia-mild, no meds, diet and exercise controlled   • Vitamin B 12 deficiency- due to gastric bypass   • History of obstructive sleep apnea- resolved with weight loss   • Iron deficiency- due to gastric bypass   • Benign nodular prostatic hyperplasia with lower urinary tract symptoms- controlled with prn tamsulosin 2-3x/wk   • Amnesia, global, transient- 2008, resolved; no recurrence; possibly due to due to medication interaction- celebrex and mobic     • Need for 23-polyvalent pneumococcal polysaccharide vaccine- tbd fall 2018   • Prophylactic antibiotic- for dental work due to TKRs   • Vitamin D deficiency   • Obesity (BMI 30-39.9)   • Hypogonadism in male   • Morbid obesity with BMI of 40.0-44.9, adult (HCC)   • Primary osteoarthritis of right hip       Outpatient Medications Prior to Visit   Medication Sig Dispense Refill   • sulfamethoxazole-trimethoprim (BACTRIM DS) 800-160 MG tablet TAKE 1 TABLET BY MOUTH TWICE A DAY 90 Tab 4   • tamsulosin (FLOMAX) 0.4 MG capsule Take 1 Cap by mouth ONE-HALF HOUR AFTER BREAKFAST. 90 Cap 4   • Cholecalciferol (VITAMIN D3) 2000 UNIT Cap Take 1 Cap by mouth See Admin Instructions. 2 times a week     • Cyanocobalamin (B-12) 1000 MCG Tab CR Take 1 Tab by mouth See Admin Instructions. 2 times a week     • Ferrous Sulfate Dried (SLOW RELEASE IRON) 45 MG Tab CR Take 1 Tab by mouth See Admin Instructions. 2 times a week       Facility-Administered Medications Prior to Visit   Medication Dose Route Frequency Provider Last Rate Last Dose   • testosterone cypionate (DEPO-TESTOSTERONE) injection 200 mg  200 mg Intramuscular Q14 DAYS Reinaldo Fenton M.D.            Allergies   Allergen Reactions   • Meloxicam Rash      "alterred mentation   • Penicillins Rash     Rash  Tolerated cefazolin 5/5/15       Review of Systems   Constitutional: Negative.    HENT: Negative.    Eyes: Negative.    Respiratory: Negative.    Cardiovascular: Negative.    Gastrointestinal: Negative.    Genitourinary: Negative.    Musculoskeletal: Positive for joint pain. Negative for back pain, falls, myalgias and neck pain.   Skin: Negative.    Neurological: Negative.    Endo/Heme/Allergies: Negative for environmental allergies and polydipsia. Does not bruise/bleed easily.        Positive: Hypogonadism   Psychiatric/Behavioral: Negative.    All other systems reviewed and are negative.           Objective:     /66 (BP Location: Left arm, Patient Position: Sitting, BP Cuff Size: Adult)   Pulse 61   Temp 36.9 °C (98.5 °F) (Temporal)   Ht 1.918 m (6' 3.51\")   Wt (!) 147.1 kg (324 lb 3.2 oz)   SpO2 94%   BMI 39.98 kg/m²     Physical Exam   Constitutional: Oriented to person, place, and time. Appears well-developed. No distress.   Head: Normocephalic and atraumatic.   Right Ear: External ear normal.   Left Ear: External ear normal.   Nose: Nose normal.   Mouth/Throat: Oropharynx is clear and moist. No oropharyngeal exudate.   Eyes: Pupils are equal, round, and reactive to light. Conjunctivae and EOM are normal. Right eye exhibits no discharge. Left eye exhibits no discharge. No scleral icterus.   Neck: Normal range of motion. Neck supple. No JVD present. No tracheal deviation present. No thyromegaly present.   Cardiovascular: Normal rate, regular rhythm, normal heart sounds and intact distal pulses.  Exam reveals no gallop and no friction rub. No murmur heard.  Pulmonary/Chest: Effort normal. No stridor. No respiratory distress. No wheezing or rales. No tenderness.   Abdominal: Soft. Bowel sounds are normal. No distension and no mass. There is no tenderness. There is no rebound and no guarding. No hernia.   Musculoskeletal: Normal range of motion No edema " or tenderness.   Lymphadenopathy: No cervical adenopathy.   Neurological: Alert and oriented to person, place, and time. Normal reflexes. Normal reflexes. No cranial nerve deficit. Normal muscle tone. Coordination normal.   Skin: Skin is warm and dry. No rash noted. Not diaphoretic. No erythema. No pallor.   Psychiatric: Normal mood and affect. Behavior is normal. Judgment and thought content normal.   Nursing note and vitals reviewed.      Lab Results   Component Value Date/Time    HBA1C 5.5 02/05/2018 01:38 PM     Lab Results   Component Value Date/Time    SODIUM 139 05/23/2019 08:27 AM    POTASSIUM 4.5 05/23/2019 08:27 AM    CHLORIDE 103 05/23/2019 08:27 AM    CO2 30 05/23/2019 08:27 AM    GLUCOSE 91 05/23/2019 08:27 AM    BUN 23 (H) 05/23/2019 08:27 AM    CREATININE 0.77 05/23/2019 08:27 AM    CREATININE 0.77 12/06/2010 08:03 AM    BUNCREATRAT 31 (H) 06/21/2016 09:57 AM    BUNCREATRAT 36 (H) 12/06/2010 08:03 AM    GLOMRATE >59 12/06/2010 08:03 AM    ALKPHOSPHAT 77 05/23/2019 08:27 AM    ASTSGOT 20 05/23/2019 08:27 AM    ALTSGPT 19 05/23/2019 08:27 AM    TBILIRUBIN 0.7 05/23/2019 08:27 AM     Lab Results   Component Value Date/Time    INR 1.07 02/05/2018 01:38 PM    INR 1.03 10/21/2013 12:12 PM    INR 1.51 (H) 06/12/2008 06:30 AM     Lab Results   Component Value Date/Time    CHOLSTRLTOT 180 05/23/2019 08:27 AM     (H) 05/23/2019 08:27 AM    HDL 65 05/23/2019 08:27 AM    TRIGLYCERIDE 68 05/23/2019 08:27 AM       Lab Results   Component Value Date/Time    TESTOSTERONE 590 05/23/2019 09:14 AM     Lab Results   Component Value Date/Time    TSH 2.700 06/21/2016 09:57 AM    TSH 3.190 07/11/2013 07:33 AM     Lab Results   Component Value Date/Time    FREET4 0.81 07/07/2014 09:54 AM    FREET4 0.89 10/21/2013 12:12 PM     No results found for: URICACID  No components found for: VITB12  Lab Results   Component Value Date/Time    25HYDROXY 13 (L) 05/23/2019 08:27 AM    25HYDROXY 18 (L) 02/09/2018 10:32 AM           Assessment/Plan:     1. Primary osteoarthritis of right hip  - Attend your appointment with orthopedics on Friday, will likely require hip replacement.    2. Vitamin D deficiency  Under control. Continue new regimen of cholecalciferol 2000 units    3. Obesity (BMI 30-39.9)   diet/exercise/lose 15 lbs.; patient counseled. Will also start testosterone injections which may help with weight loss    4. Hypogonadism male  - After discussing possible options, patient will start bimonthly testosterone injections testosterone cypionate 200 mg), which will help with low energy and possibly weight loss.   - testosterone cypionate (DEPO-TESTOSTERONE) injection 200 mg; 1 mL by Intramuscular route every 14 days.  - Comp Metabolic Panel; Future  - CBC WITH DIFFERENTIAL; Future  - PROSTATE SPECIFIC AG DIAGNOSTIC; Future  - TESTOSTERONE, FREE AND TOTAL; Future    5.  Mixed hyperlipidemia-mild, no meds, diet and exercise controlled  - Under good control. Patient will continue to manage through his own efforts of diet and exercise. Plan for fasting labs prior to next office visit to continue to monitor.  - Comp Metabolic Panel; Future    6. Vitamin B 12 deficiency- due to gastric bypass  Under good control. Continue same regimen of cyanocobalamin    7. Iron deficiency- due to gastric bypass  Under good control. Continue same regimen of dried ferrous sulfate     30 minute face-to-face encounter took place today.  More than half of this time was spent in the coordination of care of the above problems, as well as counseling.     Vic AGUIRRE (Amish), am scribing for, and in the presence of, Reinaldo Fenton M.D..    Electronically signed by: Vic Hernandez (Amish), 6/11/2019    Reinaldo AGUIRRE M.D., personally performed the services described in this documentation, as scribed by Vic Hernandez in my presence, and it is both accurate and complete.

## 2019-06-20 ENCOUNTER — HOSPITAL ENCOUNTER (OUTPATIENT)
Dept: RADIOLOGY | Facility: MEDICAL CENTER | Age: 69
End: 2019-06-20
Attending: ORTHOPAEDIC SURGERY
Payer: MEDICARE

## 2019-06-20 DIAGNOSIS — M13.859 OTHER SPECIFIED ARTHRITIS, UNSPECIFIED HIP: ICD-10-CM

## 2019-06-20 PROCEDURE — 77002 NEEDLE LOCALIZATION BY XRAY: CPT

## 2019-06-20 PROCEDURE — 700117 HCHG RX CONTRAST REV CODE 255: Performed by: ORTHOPAEDIC SURGERY

## 2019-06-20 PROCEDURE — 20610 DRAIN/INJ JOINT/BURSA W/O US: CPT | Mod: RT

## 2019-06-20 RX ORDER — TRIAMCINOLONE ACETONIDE 40 MG/ML
INJECTION, SUSPENSION INTRA-ARTICULAR; INTRAMUSCULAR
Status: DISCONTINUED
Start: 2019-06-20 | End: 2019-06-21 | Stop reason: HOSPADM

## 2019-06-20 RX ORDER — BUPIVACAINE HYDROCHLORIDE 5 MG/ML
INJECTION, SOLUTION EPIDURAL; INTRACAUDAL
Status: DISCONTINUED
Start: 2019-06-20 | End: 2019-06-21 | Stop reason: HOSPADM

## 2019-06-20 RX ORDER — LIDOCAINE HYDROCHLORIDE 10 MG/ML
INJECTION, SOLUTION INFILTRATION; PERINEURAL
Status: DISCONTINUED
Start: 2019-06-20 | End: 2019-06-21 | Stop reason: HOSPADM

## 2019-06-20 RX ADMIN — IOHEXOL 3 ML: 300 INJECTION, SOLUTION INTRAVENOUS at 14:50

## 2019-06-25 ENCOUNTER — NON-PROVIDER VISIT (OUTPATIENT)
Dept: MEDICAL GROUP | Age: 69
End: 2019-06-25
Payer: MEDICARE

## 2019-06-25 RX ADMIN — TESTOSTERONE CYPIONATE 200 MG: 200 INJECTION, SOLUTION INTRAMUSCULAR at 08:34

## 2019-06-25 NOTE — PROGRESS NOTES
Jhony Rodriguez is a 68 y.o. male here for a non-provider visit for testosterone injection.    Reason for injection: Hypogonadism male  Order in MAR?: Yes  Patient supplied?:No  Minimum interval has been met for this injection (per MAR order): Yes    Order and dose verified by:   Patient tolerated injection and no adverse effects were observed or reported: Yes    # of Administrations remaining in MAR: 5

## 2019-07-09 ENCOUNTER — NON-PROVIDER VISIT (OUTPATIENT)
Dept: MEDICAL GROUP | Age: 69
End: 2019-07-09
Payer: MEDICARE

## 2019-07-09 PROCEDURE — 96372 THER/PROPH/DIAG INJ SC/IM: CPT | Performed by: INTERNAL MEDICINE

## 2019-07-09 RX ADMIN — TESTOSTERONE CYPIONATE 200 MG: 200 INJECTION, SOLUTION INTRAMUSCULAR at 08:31

## 2019-07-09 NOTE — PROGRESS NOTES
Jhony Rodriguez is a 68 y.o. male here for a non-provider visit for Testosterone injection.    Reason for injection: Low testosterone  Order in MAR?: Yes  Patient supplied?:No  Minimum interval has been met for this injection (per MAR order): Yes    Order and dose verified by:   Patient tolerated injection and no adverse effects were observed or reported: Yes    # of Administrations remaining in MAR: N/A

## 2019-07-19 NOTE — PROGRESS NOTES
Wished mbr a happy birthday and mbr stated he received my letter. No other information was reviewed

## 2019-07-23 ENCOUNTER — NON-PROVIDER VISIT (OUTPATIENT)
Dept: MEDICAL GROUP | Age: 69
End: 2019-07-23
Payer: MEDICARE

## 2019-07-23 DIAGNOSIS — E29.1 HYPOGONADISM MALE: ICD-10-CM

## 2019-07-23 PROCEDURE — 96372 THER/PROPH/DIAG INJ SC/IM: CPT | Performed by: INTERNAL MEDICINE

## 2019-07-23 RX ADMIN — TESTOSTERONE CYPIONATE 200 MG: 200 INJECTION, SOLUTION INTRAMUSCULAR at 10:21

## 2019-08-12 ENCOUNTER — PATIENT OUTREACH (OUTPATIENT)
Dept: HEALTH INFORMATION MANAGEMENT | Facility: OTHER | Age: 69
End: 2019-08-12

## 2019-08-12 ENCOUNTER — TELEPHONE (OUTPATIENT)
Dept: HEALTH INFORMATION MANAGEMENT | Facility: OTHER | Age: 69
End: 2019-08-12

## 2019-08-12 DIAGNOSIS — R06.83 SNORING: ICD-10-CM

## 2019-08-12 DIAGNOSIS — G47.33 OBSTRUCTIVE APNEA: Primary | ICD-10-CM

## 2019-08-12 NOTE — TELEPHONE ENCOUNTER
2. SPECIFIC Action To Be Taken: Referral pending, please sign.    3. Diagnosis/Clinical Reason for Request: obstructive sleep apnea, trouble sleeping, and snoring    4. Specialty & Provider Name/Lab/Imaging Location: pulmonary     5. Is appointment scheduled for requested order/referral: no    Patient was informed they will receive a return phone call from the office ONLY if there are any questions before processing their request. Advised to call back if they haven't received a call from the referral department in 5 days.

## 2019-08-12 NOTE — PROGRESS NOTES
Mbr called and I advised that   20% coinsurance of the cost for each Medicare-covered item.      $40 copayment for each Medicare-covered individual/group therapy visit.      $50 for other outpatient diagnostic testing.

## 2019-09-10 ENCOUNTER — TELEPHONE (OUTPATIENT)
Dept: MEDICAL GROUP | Age: 69
End: 2019-09-10

## 2019-09-10 NOTE — TELEPHONE ENCOUNTER
ESTABLISHED PATIENT PRE-VISIT PLANNING     Patient was NOT contacted to complete PVP.     Note: Patient will not be contacted if there is no indication to call.     1.  Reviewed notes from the last few office visits within the medical group: Yes    2.  If any orders were placed at last visit or intended to be done for this visit (i.e. 6 mos follow-up), do we have Results/Consult Notes?        •  Labs - Labs were not ordered at last office visit.   Note: If patient appointment is for lab review and patient did not complete labs, check with provider if OK to reschedule patient until labs completed.       •  Imaging - Imaging was not ordered at last office visit.       •  Referrals - Referral ordered, patient has NOT been seen.    3. Is this appointment scheduled as a Hospital Follow-Up? No    4.  Immunizations were updated in Epic using WebIZ?: Epic matches WebIZ       •  Web Iz Recommendations: FLU and SHINGRIX (Shingles)    5.  Patient is due for the following Health Maintenance Topics:   Health Maintenance Due   Topic Date Due   • HEPATITIS C SCREENING  1950   • IMM ZOSTER VACCINES (1 of 2) 07/19/2002   • IMM INFLUENZA (1) 09/01/2019           6. Orders for overdue Health Maintenance topics pended in Pre-Charting? N\A    7.  AHA (MDX) form printed for Provider? No, already completed    8.  Patient was NOT informed to arrive 15 min prior to their scheduled appointment and bring in their medication bottles.

## 2019-09-12 ENCOUNTER — OFFICE VISIT (OUTPATIENT)
Dept: MEDICAL GROUP | Age: 69
End: 2019-09-12
Payer: MEDICARE

## 2019-09-12 VITALS
BODY MASS INDEX: 37.19 KG/M2 | HEIGHT: 77 IN | OXYGEN SATURATION: 95 % | HEART RATE: 60 BPM | WEIGHT: 315 LBS | SYSTOLIC BLOOD PRESSURE: 112 MMHG | DIASTOLIC BLOOD PRESSURE: 62 MMHG | TEMPERATURE: 98.6 F

## 2019-09-12 DIAGNOSIS — N40.1 BENIGN NODULAR PROSTATIC HYPERPLASIA WITH LOWER URINARY TRACT SYMPTOMS: ICD-10-CM

## 2019-09-12 DIAGNOSIS — M16.11 PRIMARY OSTEOARTHRITIS OF RIGHT HIP: ICD-10-CM

## 2019-09-12 DIAGNOSIS — E78.2 MIXED HYPERLIPIDEMIA: ICD-10-CM

## 2019-09-12 DIAGNOSIS — E29.1 HYPOGONADISM MALE: ICD-10-CM

## 2019-09-12 DIAGNOSIS — Z23 NEED FOR VACCINATION: Primary | ICD-10-CM

## 2019-09-12 DIAGNOSIS — G47.33 OSA ON CPAP: ICD-10-CM

## 2019-09-12 DIAGNOSIS — E53.8 VITAMIN B 12 DEFICIENCY: ICD-10-CM

## 2019-09-12 DIAGNOSIS — R73.01 IFG (IMPAIRED FASTING GLUCOSE): ICD-10-CM

## 2019-09-12 DIAGNOSIS — E66.9 OBESITY (BMI 30-39.9): ICD-10-CM

## 2019-09-12 DIAGNOSIS — E61.1 IRON DEFICIENCY: ICD-10-CM

## 2019-09-12 DIAGNOSIS — E55.9 VITAMIN D DEFICIENCY: ICD-10-CM

## 2019-09-12 PROCEDURE — 99214 OFFICE O/P EST MOD 30 MIN: CPT | Mod: 25 | Performed by: INTERNAL MEDICINE

## 2019-09-12 PROCEDURE — G0008 ADMIN INFLUENZA VIRUS VAC: HCPCS | Performed by: INTERNAL MEDICINE

## 2019-09-12 PROCEDURE — 90662 IIV NO PRSV INCREASED AG IM: CPT | Performed by: INTERNAL MEDICINE

## 2019-09-12 ASSESSMENT — ENCOUNTER SYMPTOMS
BACK PAIN: 0
FALLS: 0
RESPIRATORY NEGATIVE: 1
GASTROINTESTINAL NEGATIVE: 1
PSYCHIATRIC NEGATIVE: 1
CARDIOVASCULAR NEGATIVE: 1
CONSTITUTIONAL NEGATIVE: 1
MYALGIAS: 0
NECK PAIN: 0
EYES NEGATIVE: 1
NEUROLOGICAL NEGATIVE: 1

## 2019-09-12 NOTE — PROGRESS NOTES
Subjective:      Jhony Rodriguez is a 69 y.o. male who presents with Hip Pain (would like to discuss care options) and Other (would like his testosterone levels checked )        HPI    The patient is here for followup of chronic medical problems listed below. The patient is compliant with medications and having no side effects from them. Denies chest pain, abdominal pain, dyspnea, myalgias, or cough.      IFG (impaired fasting glucose)  Patient has prior history of impaired fasting glucose. Lab results from 5/23 show fasting glucose at 91.    Dyslipidemia  Patient is managing through his own efforts of diet and exercise. Lipid panel from 5/23 shows his LDL is elevated however all other results are within normal limits.    Benign nodular prostatic hyperplasia with lower urinary tract symptoms- controlled with prn tamsulosin 2-3x/wk  Patient is taking tamsulosin as needed for control of his symptoms. He tolerates this well without side effects.    Hypogonadism male  On 5/23 Jhony's free testosterone was low at 43 and testosterone % free was low at 0.7. Total testosterone was normal at 590. Patient was referred to get testosterone supplementation to help with weight loss and energy increase. He has since received injections, and is requesting to have his levels re-tested.    Primary osteoarthritis of right hip  Jhony has known osteoarthritis of his right hip. He has since received a cortisone shot in his right hip, which has improved his pain. He was also recommended to consider a hip replacement for long term treatment.    Vitamin D deficiency  Patient has a history of chronically low Vitamin D levels with his last Vitamin D level being 13    Vitamin B 12 deficiency- due to gastric bypass  Patient has a history of low Vitamin B12 levels on lab work in 2012. Results since then have been within normal limits with most recent levels being checked on 5/23.    Iron deficiency- due to gastric bypass  Patient has a  previous history of low iron secondary to undergoing gastric bypass surgery. Most recent ferritin and iron levels are both within normal limits.    CARLOS on CPAP  Jhony was previously using a CPAP at night when he was heavier and suffered more greatly from CARLOS. This was treated with gastric bypass surgery. Today, he is claiming that his wife has noticed him snoring more at night. He has a referral to sleep studies with plans to have another performed and be put back on CPAP if needed.          Patient Active Problem List   Diagnosis   • S/P RF ablation operation for arrhythmia-2008; no recurrence; no meds   • Gastric bypass status for obesity-2005 dr gallo   •  Mixed hyperlipidemia-mild, no meds, diet and exercise controlled   • Vitamin B 12 deficiency- due to gastric bypass   • History of obstructive sleep apnea- resolved with weight loss   • Iron deficiency- due to gastric bypass   • Benign nodular prostatic hyperplasia with lower urinary tract symptoms- controlled with prn tamsulosin 2-3x/wk   • Amnesia, global, transient- 2008, resolved; no recurrence; possibly due to due to medication interaction- celebrex and mobic     • Need for 23-polyvalent pneumococcal polysaccharide vaccine- tbd fall 2018   • Prophylactic antibiotic- for dental work due to TKRs   • Vitamin D deficiency   • Obesity (BMI 30-39.9)   • Hypogonadism male   • Primary osteoarthritis of right hip       Outpatient Medications Prior to Visit   Medication Sig Dispense Refill   • sulfamethoxazole-trimethoprim (BACTRIM DS) 800-160 MG tablet TAKE 1 TABLET BY MOUTH TWICE A DAY 90 Tab 4   • tamsulosin (FLOMAX) 0.4 MG capsule Take 1 Cap by mouth ONE-HALF HOUR AFTER BREAKFAST. 90 Cap 4   • Cholecalciferol (VITAMIN D3) 2000 UNIT Cap Take 1 Cap by mouth See Admin Instructions. 2 times a week     • Cyanocobalamin (B-12) 1000 MCG Tab CR Take 1 Tab by mouth See Admin Instructions. 2 times a week     • Ferrous Sulfate Dried (SLOW RELEASE IRON) 45 MG Tab CR Take 1  "Tab by mouth See Admin Instructions. 2 times a week       Facility-Administered Medications Prior to Visit   Medication Dose Route Frequency Provider Last Rate Last Dose   • testosterone cypionate (DEPO-TESTOSTERONE) injection 200 mg  200 mg Intramuscular Q14 DAYS Reinaldo Fenton M.D.   200 mg at 07/23/19 1021   • testosterone cypionate (DEPO-TESTOSTERONE) injection 200 mg  200 mg Intramuscular Q14 DAYS Reinaldo Fenton M.D.            Allergies   Allergen Reactions   • Meloxicam Rash     alterred mentation   • Penicillins Rash     Rash  Tolerated cefazolin 5/5/15       Review of Systems   Constitutional: Negative.    HENT: Negative.    Eyes: Negative.    Respiratory: Negative.    Cardiovascular: Negative.    Gastrointestinal: Negative.    Genitourinary: Negative.    Musculoskeletal: Positive for joint pain. Negative for back pain, falls, myalgias and neck pain.   Skin: Negative.    Neurological: Negative.    Endo/Heme/Allergies: Negative.    Psychiatric/Behavioral: Negative.    All other systems reviewed and are negative.       Objective:     /62 (BP Location: Left arm, Patient Position: Sitting, BP Cuff Size: Large adult)   Pulse 60   Temp 37 °C (98.6 °F) (Temporal)   Ht 1.943 m (6' 4.5\")   Wt (!) 147.8 kg (325 lb 12.8 oz)   SpO2 95%   BMI 39.14 kg/m²     Physical Exam   Constitutional: Oriented to person, place, and time. Appears well-developed and well-nourished. No distress.   Head: Normocephalic and atraumatic.   Right Ear: External ear normal.   Left Ear: External ear normal.   Nose: Nose normal.   Mouth/Throat: Oropharynx is clear and moist. No oropharyngeal exudate.   Eyes: Pupils are equal, round, and reactive to light. Conjunctivae and EOM are normal. Right eye exhibits no discharge. Left eye exhibits no discharge. No scleral icterus.   Neck: Normal range of motion. Neck supple. No JVD present. No tracheal deviation present. No thyromegaly present.   Cardiovascular: Normal rate, regular " rhythm, normal heart sounds and intact distal pulses.  Exam reveals no gallop and no friction rub.    No murmur heard.  Pulmonary/Chest: Effort normal. No stridor. No respiratory distress. No wheezing or rales. No tenderness.   Abdominal: Soft. Bowel sounds are normal. No distension and no mass. There is no tenderness. There is no rebound and no guarding. No hernia.   Musculoskeletal: Normal range of motion No edema or tenderness.   Lymphadenopathy: No cervical adenopathy.   Neurological: Alert and oriented to person, place, and time. Normal reflexes. Normal reflexes. No cranial nerve deficit. Normal muscle tone. Coordination normal.   Skin: Skin is warm and dry. No rash noted. Not diaphoretic. No erythema. No pallor.   Psychiatric: Normal mood and affect. Behavior is normal. Judgment and thought content normal.   Nursing note and vitals reviewed.      Lab Results   Component Value Date/Time    HBA1C 5.5 02/05/2018 01:38 PM     Lab Results   Component Value Date/Time    SODIUM 139 05/23/2019 08:27 AM    POTASSIUM 4.5 05/23/2019 08:27 AM    CHLORIDE 103 05/23/2019 08:27 AM    CO2 30 05/23/2019 08:27 AM    GLUCOSE 91 05/23/2019 08:27 AM    BUN 23 (H) 05/23/2019 08:27 AM    CREATININE 0.77 05/23/2019 08:27 AM    CREATININE 0.77 12/06/2010 08:03 AM    BUNCREATRAT 31 (H) 06/21/2016 09:57 AM    BUNCREATRAT 36 (H) 12/06/2010 08:03 AM    GLOMRATE >59 12/06/2010 08:03 AM    ALKPHOSPHAT 77 05/23/2019 08:27 AM    ASTSGOT 20 05/23/2019 08:27 AM    ALTSGPT 19 05/23/2019 08:27 AM    TBILIRUBIN 0.7 05/23/2019 08:27 AM     Lab Results   Component Value Date/Time    INR 1.07 02/05/2018 01:38 PM    INR 1.03 10/21/2013 12:12 PM    INR 1.51 (H) 06/12/2008 06:30 AM     Lab Results   Component Value Date/Time    CHOLSTRLTOT 180 05/23/2019 08:27 AM     (H) 05/23/2019 08:27 AM    HDL 65 05/23/2019 08:27 AM    TRIGLYCERIDE 68 05/23/2019 08:27 AM       Lab Results   Component Value Date/Time    TESTOSTERONE 590 05/23/2019 09:14 AM      Lab Results   Component Value Date/Time    TSH 2.700 06/21/2016 09:57 AM    TSH 3.190 07/11/2013 07:33 AM     Lab Results   Component Value Date/Time    FREET4 0.81 07/07/2014 09:54 AM    FREET4 0.89 10/21/2013 12:12 PM     No results found for: URICACID  No components found for: VITB12  Lab Results   Component Value Date/Time    25HYDROXY 13 (L) 05/23/2019 08:27 AM    25HYDROXY 18 (L) 02/09/2018 10:32 AM          Assessment/Plan:     1. Need for vaccination  - Referred to get vaccination at pharmacy or return here when available.  - Influenza Vaccine, High Dose (65+ Only)    2. IFG (impaired fasting glucose)  - Plan for fasting labs prior to next office visit to continue to monitor.  - HEMOGLOBIN A1C; Future  - Comp Metabolic Panel; Future    3.  Mixed hyperlipidemia-mild, no meds, diet and exercise controlled  - Under good control. Continue same regimen.  - Comp Metabolic Panel; Future    4. Benign nodular prostatic hyperplasia with lower urinary tract symptoms- controlled with prn tamsulosin 2-3x/wk  - Patient has been stable with current management. We will make no changes for now    5. Hypogonadism male  - Follow up to have lab work completed to evaluate levels and efficacy of treatment.  - CBC WITH DIFFERENTIAL; Future  - TESTOSTERONE, FREE AND TOTAL; Future  - PROSTATE SPECIFIC AG DIAGNOSTIC; Future    6. Obesity (BMI 30-39.9)  -  diet/exercise/lose 15 lbs.; patient counseled    7. Primary osteoarthritis of right hip  - Patient will continue to follow orthopedics for treatment and consultation    8. Vitamin D deficiency  - Plan for fasting labs prior to next office visit to continue to monitor.  - VITAMIN D,25 HYDROXY; Future    9. Vitamin B 12 deficiency- due to gastric bypass  - Plan for fasting labs prior to next office visit to continue to monitor.  - VITAMIN B12; Future    10. Iron deficiency- due to gastric bypass  - Plan for fasting labs prior to next office visit to continue to monitor.  -  FERRITIN; Future  - IRON/TOTAL IRON BIND; Future    11. CARLOS on CPAP   - Follow up with sleep studies as he is already referred to do so.      30 minute face-to-face encounter took place today.  More than half of this time was spent in the coordination of care of the above problems, as well as counseling.     Vic AGUIRRE (Scribe), am scribing for, and in the presence of, Reinaldo Fenton M.D..    Electronically signed by: Vic Hernandez (Scribe), 9/12/2019    IReinaldo M.D., personally performed the services described in this documentation, as scribed by Vic Hernandez in my presence, and it is both accurate and complete.

## 2019-09-25 ENCOUNTER — HOSPITAL ENCOUNTER (OUTPATIENT)
Dept: LAB | Facility: MEDICAL CENTER | Age: 69
End: 2019-09-25
Attending: INTERNAL MEDICINE
Payer: MEDICARE

## 2019-09-25 DIAGNOSIS — R73.01 IFG (IMPAIRED FASTING GLUCOSE): ICD-10-CM

## 2019-09-25 DIAGNOSIS — E61.1 IRON DEFICIENCY: ICD-10-CM

## 2019-09-25 DIAGNOSIS — E55.9 VITAMIN D DEFICIENCY: ICD-10-CM

## 2019-09-25 DIAGNOSIS — E78.2 MIXED HYPERLIPIDEMIA: ICD-10-CM

## 2019-09-25 DIAGNOSIS — E29.1 HYPOGONADISM MALE: ICD-10-CM

## 2019-09-25 DIAGNOSIS — E53.8 VITAMIN B 12 DEFICIENCY: ICD-10-CM

## 2019-09-25 LAB
25(OH)D3 SERPL-MCNC: 26 NG/ML (ref 30–100)
ALBUMIN SERPL BCP-MCNC: 3.9 G/DL (ref 3.2–4.9)
ALBUMIN/GLOB SERPL: 2 G/DL
ALP SERPL-CCNC: 64 U/L (ref 30–99)
ALT SERPL-CCNC: 24 U/L (ref 2–50)
ANION GAP SERPL CALC-SCNC: 4 MMOL/L (ref 0–11.9)
AST SERPL-CCNC: 21 U/L (ref 12–45)
BASOPHILS # BLD AUTO: 0.7 % (ref 0–1.8)
BASOPHILS # BLD: 0.04 K/UL (ref 0–0.12)
BILIRUB SERPL-MCNC: 0.7 MG/DL (ref 0.1–1.5)
BUN SERPL-MCNC: 21 MG/DL (ref 8–22)
CALCIUM SERPL-MCNC: 9.2 MG/DL (ref 8.5–10.5)
CHLORIDE SERPL-SCNC: 105 MMOL/L (ref 96–112)
CO2 SERPL-SCNC: 28 MMOL/L (ref 20–33)
CREAT SERPL-MCNC: 0.94 MG/DL (ref 0.5–1.4)
EOSINOPHIL # BLD AUTO: 0.14 K/UL (ref 0–0.51)
EOSINOPHIL NFR BLD: 2.6 % (ref 0–6.9)
ERYTHROCYTE [DISTWIDTH] IN BLOOD BY AUTOMATED COUNT: 43.1 FL (ref 35.9–50)
EST. AVERAGE GLUCOSE BLD GHB EST-MCNC: 105 MG/DL
FASTING STATUS PATIENT QL REPORTED: NORMAL
FERRITIN SERPL-MCNC: 43.1 NG/ML (ref 22–322)
GLOBULIN SER CALC-MCNC: 2 G/DL (ref 1.9–3.5)
GLUCOSE SERPL-MCNC: 92 MG/DL (ref 65–99)
HBA1C MFR BLD: 5.3 % (ref 0–5.6)
HCT VFR BLD AUTO: 48.1 % (ref 42–52)
HGB BLD-MCNC: 14.8 G/DL (ref 14–18)
IMM GRANULOCYTES # BLD AUTO: 0.02 K/UL (ref 0–0.11)
IMM GRANULOCYTES NFR BLD AUTO: 0.4 % (ref 0–0.9)
IRON SATN MFR SERPL: 28 % (ref 15–55)
IRON SERPL-MCNC: 105 UG/DL (ref 50–180)
LYMPHOCYTES # BLD AUTO: 0.67 K/UL (ref 1–4.8)
LYMPHOCYTES NFR BLD: 12.5 % (ref 22–41)
MCH RBC QN AUTO: 28.2 PG (ref 27–33)
MCHC RBC AUTO-ENTMCNC: 30.8 G/DL (ref 33.7–35.3)
MCV RBC AUTO: 91.6 FL (ref 81.4–97.8)
MONOCYTES # BLD AUTO: 0.33 K/UL (ref 0–0.85)
MONOCYTES NFR BLD AUTO: 6.2 % (ref 0–13.4)
NEUTROPHILS # BLD AUTO: 4.14 K/UL (ref 1.82–7.42)
NEUTROPHILS NFR BLD: 77.6 % (ref 44–72)
NRBC # BLD AUTO: 0 K/UL
NRBC BLD-RTO: 0 /100 WBC
PLATELET # BLD AUTO: 240 K/UL (ref 164–446)
PMV BLD AUTO: 10.8 FL (ref 9–12.9)
POTASSIUM SERPL-SCNC: 4.7 MMOL/L (ref 3.6–5.5)
PROT SERPL-MCNC: 5.9 G/DL (ref 6–8.2)
PSA SERPL-MCNC: 0.29 NG/ML (ref 0–4)
RBC # BLD AUTO: 5.25 M/UL (ref 4.7–6.1)
SODIUM SERPL-SCNC: 137 MMOL/L (ref 135–145)
TIBC SERPL-MCNC: 371 UG/DL (ref 250–450)
VIT B12 SERPL-MCNC: 393 PG/ML (ref 211–911)
WBC # BLD AUTO: 5.3 K/UL (ref 4.8–10.8)

## 2019-09-25 PROCEDURE — 82306 VITAMIN D 25 HYDROXY: CPT

## 2019-09-25 PROCEDURE — 84270 ASSAY OF SEX HORMONE GLOBUL: CPT

## 2019-09-25 PROCEDURE — 84403 ASSAY OF TOTAL TESTOSTERONE: CPT

## 2019-09-25 PROCEDURE — 80053 COMPREHEN METABOLIC PANEL: CPT

## 2019-09-25 PROCEDURE — 82607 VITAMIN B-12: CPT

## 2019-09-25 PROCEDURE — 36415 COLL VENOUS BLD VENIPUNCTURE: CPT

## 2019-09-25 PROCEDURE — 83550 IRON BINDING TEST: CPT

## 2019-09-25 PROCEDURE — 83540 ASSAY OF IRON: CPT

## 2019-09-25 PROCEDURE — 83036 HEMOGLOBIN GLYCOSYLATED A1C: CPT

## 2019-09-25 PROCEDURE — 85025 COMPLETE CBC W/AUTO DIFF WBC: CPT

## 2019-09-25 PROCEDURE — 82728 ASSAY OF FERRITIN: CPT

## 2019-09-25 PROCEDURE — 84153 ASSAY OF PSA TOTAL: CPT

## 2019-09-26 LAB
SHBG SERPL-SCNC: 88 NMOL/L (ref 11–80)
TESTOST FREE MFR SERPL: 1 % (ref 1.6–2.9)
TESTOST FREE SERPL-MCNC: 44 PG/ML (ref 47–244)
TESTOST SERPL-MCNC: 463 NG/DL (ref 300–720)

## 2019-10-17 NOTE — PROGRESS NOTES
Kept NPO for  Stress test.   [Vision Problems] : vision problems [Negative] : Heme/Lymph [Discharge] : no discharge [Pain] : no pain [Redness] : no redness [Dryness] : no dryness  [Itching] : no itching [FreeTextEntry3] : vision problems since age 9, losing vision progressively

## 2019-10-29 NOTE — PROGRESS NOTES
mbr called to get a few names of chiropractics accepting SCP. Adv of [facility] and [facility] chiropractic and provided phone numbers.

## 2019-11-05 NOTE — PROGRESS NOTES
Adv mbr that for Hip surgery it would be   $275 copayment for each Medicare-covered visit for surgery     Also, after surgery as an inpatient   $275 per day for day(s) 1-4   $0 each day for day(s) 5-90 for a Medicare-covered stay at a [facility].      Also adv that if he uses   $20 copayment each day for day(s) 1-20   $100 copayment each day for day(s) 21-34   $0 copayment each day for days  for a stay at a Skilled Nursing Facility.

## 2019-11-12 ENCOUNTER — HOSPITAL ENCOUNTER (OUTPATIENT)
Dept: RADIOLOGY | Facility: MEDICAL CENTER | Age: 69
End: 2019-11-12
Attending: ORTHOPAEDIC SURGERY
Payer: MEDICARE

## 2019-11-12 DIAGNOSIS — M13.859: ICD-10-CM

## 2019-11-12 PROCEDURE — 73700 CT LOWER EXTREMITY W/O DYE: CPT | Mod: RT

## 2019-11-20 ENCOUNTER — SLEEP CENTER VISIT (OUTPATIENT)
Dept: SLEEP MEDICINE | Facility: MEDICAL CENTER | Age: 69
End: 2019-11-20
Payer: MEDICARE

## 2019-11-20 VITALS
OXYGEN SATURATION: 94 % | HEIGHT: 77 IN | SYSTOLIC BLOOD PRESSURE: 118 MMHG | HEART RATE: 64 BPM | RESPIRATION RATE: 15 BRPM | DIASTOLIC BLOOD PRESSURE: 70 MMHG | BODY MASS INDEX: 37.19 KG/M2 | WEIGHT: 315 LBS

## 2019-11-20 DIAGNOSIS — G47.33 OSA (OBSTRUCTIVE SLEEP APNEA): ICD-10-CM

## 2019-11-20 PROCEDURE — 99203 OFFICE O/P NEW LOW 30 MIN: CPT | Performed by: FAMILY MEDICINE

## 2019-11-20 NOTE — PROGRESS NOTES
"     [facility]  Consult Note     Date: 11/20/2019 / Time: 1:44 PM    Patient ID:   Name:             Jhony Rodriguez   YOB: 1950  Age:                 69 y.o.  male   MRN:               [id number]      Thank you for requesting a sleep medicine consultation on Jhony Rodriguez at the sleep center. He presents today with the chief complaints of CARLOS and to establish as a new pt. He was previously seen by [facility]. Pt was diagnosed with CARLOS in 20102 and had titration study on 12/14/02. Based on the study, the best tolerated pressure was BiPAP 19/15 cm, the AHi improved to 0/hr and and O2 nikolay was 88%. He was on BiPAP for few years,however he lost significant weight and stopped using the BiPAP at that time.The initial presenting symptoms were snoring and non refreshing sleep.He is referred by Dr. Fenton for evaluation and treatment of sleep disorder breathing.     HISTORY OF PRESENT ILLNESS:       At night,  Jhony Rodriguez goes to bed around 9:30 pm on weekdays and the weekends. He gets out of bed at 6:15 am on weekdays and on the weekends.  He  averages about 7.5 hrs of sleep on a good night and 6 hrs on a bad night. He falls asleep within 10-15 minutes. He awakens 3 times during the night due to bathroom use. It takes him 10-20 min to fall back asleep.     He has relapse of the CARLOS symptoms including snoring and breathing pauses in sleep . However he  denies any symptoms of restless legs syndrome such as an \"urge to move\"  He  legs in the evening or bedtime. He  denies any symptoms of narcolepsy such as sleep paralysis or cataplexy, or any symptoms to suggest parasomnias such as sleep walking or acting out of dreams. He  has not used any medications for the sleep problem.    Overall, he doesnot finds his sleep refreshing. In terms of  excessive daytime sleepiness,  He complains of sleepiness  while reading or watching TV however denies while driving. [place] sleepiness scale score is " 5/24.He does not take regular naps.     REVIEW OF SYSTEMS:       Constitutional: Denies fevers, Denies weight changes  Eyes: Denies changes in vision, no eye pain  Ears/Nose/Throat/Mouth: Denies nasal congestion or sore throat   Cardiovascular: Denies chest pain or palpitations   Respiratory: Denies shortness of breath , Denies cough  Gastrointestinal/Hepatic: Denies abdominal pain, nausea, vomiting, diarrhea,   Genitourinary: Denies bladder dysfunction, dysuria or frequency  Musculoskeletal/Rheum:+ hip pain  Skin/Breast: Denies rash  Neurological: Denies headache, confusion, memory loss   Psychiatric: denies mood disorder     Comprehensive review of systems form is reviewed with the patient and is attached in the EMR.     PMH:  has a past medical history of Arthritis, Atrial fibrillation (HCC), Chickenpox, [place] measles, H/O cardiac radiofrequency ablation (2006), Influenza, Mumps, Obesity, and Snoring. He also has no past medical history of Anesthesia.  MEDS:   Current Outpatient Medications:   •  sulfamethoxazole-trimethoprim (BACTRIM DS) 800-160 MG tablet, TAKE 1 TABLET BY MOUTH TWICE A DAY, Disp: 90 Tab, Rfl: 4  •  tamsulosin (FLOMAX) 0.4 MG capsule, Take 1 Cap by mouth ONE-HALF HOUR AFTER BREAKFAST., Disp: 90 Cap, Rfl: 4  •  Cholecalciferol (VITAMIN D3) 2000 UNIT Cap, Take 1 Cap by mouth See Admin Instructions. 2 times a week, Disp: , Rfl:   •  Cyanocobalamin (B-12) 1000 MCG Tab CR, Take 1 Tab by mouth See Admin Instructions. 2 times a week, Disp: , Rfl:   •  Ferrous Sulfate Dried (SLOW RELEASE IRON) 45 MG Tab CR, Take 1 Tab by mouth See Admin Instructions. 2 times a week, Disp: , Rfl:     Current Facility-Administered Medications:   •  testosterone cypionate (DEPO-TESTOSTERONE) injection 200 mg, 200 mg, Intramuscular, Q14 DAYS, Reinaldo Fenton M.D., 200 mg at 07/23/19 1021  •  testosterone cypionate (DEPO-TESTOSTERONE) injection 200 mg, 200 mg, Intramuscular, Q14 DAYS, Reinaldo Fenton M.D.  ALLERGIES:  "  Allergies   Allergen Reactions   • Meloxicam Rash     alterred mentation   • Penicillins Rash     Rash  Tolerated cefazolin 5/5/15     SURGHX:   Past Surgical History:   Procedure Laterality Date   • KNEE ARTHROPLASTY TOTAL Left 5/5/2015    Procedure: KNEE ARTHROPLASTY TOTAL ;  Surgeon: Mike Michele M.D.;  Location: SURGERY [facility];  Service:    • KNEE ARTHROPLASTY TOTAL  1/6/2015    Performed by Mike Michele M.D. at SURGERY [facility]   • INGUINAL HERNIA REPAIR  8/16/2013    Performed by Martin Dillon M.D. at SURGERY [facility]   • OTHER ORTHOPEDIC SURGERY  2006    left knee meniscectomy; dr rhodes   • GASTRIC BYPASS LAPAROSCOPIC  2004    dr. gallo   • LAPAROTOMY  2004    bowel perforation repair, following gastric  bypass   • ARTHROSCOPY, KNEE     • HIP REPLACEMENT, TOTAL     • SLEEVE,CHEMO VASO THIGH       SOCHX:  reports that he quit smoking about 7 years ago. His smoking use included cigars. He smoked 0.00 packs per day for 3.00 years. He has never used smokeless tobacco. He reports current alcohol use. He reports that he does not use drugs.   FH:   Family History   Problem Relation Age of Onset   • Cancer Maternal Aunt    • Cancer Maternal Uncle    • Cancer Maternal Grandfather    • Thyroid Mother    • Heart Disease Mother    • Heart Disease Brother    • Sleep Apnea Neg Hx            Physical Exam:  Vitals/ General Appearance:   Weight/BMI: Body mass index is 39.16 kg/m².  /70 (BP Location: Left arm, Patient Position: Sitting, BP Cuff Size: Adult)   Pulse 64   Resp 15   Ht 1.943 m (6' 4.5\")   Wt (!) 147.9 kg (326 lb)   SpO2 94%   Vitals:    11/20/19 1336   BP: 118/70   BP Location: Left arm   Patient Position: Sitting   BP Cuff Size: Adult   Pulse: 64   Resp: 15   SpO2: 94%   Weight: (!) 147.9 kg (326 lb)   Height: 1.943 m (6' 4.5\")       Pt. is alert and oriented to time, place and person. Cooperative and in no apparent distress.       -Head: Atraumatic, normocephalic. "   -. Ears: Normal tympanic membrane and no discharge  -. Nose: No inferior turbinate hypertophy, + septal deviation   -. Throat: Oropharynx appears crowded in that the palate is overhanging (Malam Natalie scale 3. Tonsils are not enlarged, uvula is not large, pharynx not inflamed.  -. Neck: Supple. No thyromegaly  -. Chest: Trachea central,   -. Lungs auscultation: B/L good air entry, vesicular breath sounds, no wheezing  -. Heart auscultation: 1st and 2nd heart sounds normal, regular rhythm. No appreciable murmur.  - Extremities: no pedal edema.  - Skin: No visible rash  - NEUROLOGICAL EXAMINATION: On neurological exam, the patient was alert and oriented x3. speech was clear and fluent without dysarthria.      INVESTIGATIONS:       ASSESSMENT AND PLAN     1. He  has symptoms of Obstructive Sleep Apnea (CARLOS). He  has excessive daytime sleepiness that  interferes with activites of daily living. He  risk factors for CARLOS include obesity, thick neck, and crowded oropharynx.     The pathophysiology of CARLOS and the increased risk of cardiovascular morbidity from untreated CARLOS is discussed in detail with the patient.    We have discussed diagnostic options including in-laboratory, attended polysomnography and home sleep testing. We have also discussed treatment options including airway pressurization, reconstructive otolaryngologic surgery, dental appliances and weight management.       Subsequently,treatment options will be discussed based on the diagnostic study. Meanwhile, He is urged to avoid supine sleep, weight gain and alcoholic beverages since all of these can worsen CARLOS. He is cautioned against drowsy driving. If He feels sleepy while driving, He must pull over for a break/nap, rather than persist on the road, in the interest of He own safety and that of others on the road.    Plan  -  He  will be scheduled for an overnight PSG to assess sleep related  breathing disorder.    2. Regarding treatment of other past  medical problems and general health maintenance,  He is urged to follow up with PCP.

## 2019-12-08 DIAGNOSIS — N40.1 BENIGN NODULAR PROSTATIC HYPERPLASIA WITH LOWER URINARY TRACT SYMPTOMS: ICD-10-CM

## 2019-12-09 RX ORDER — TAMSULOSIN HYDROCHLORIDE 0.4 MG/1
0.4 CAPSULE ORAL
Qty: 90 CAP | Refills: 4 | Status: SHIPPED | OUTPATIENT
Start: 2019-12-09 | End: 2021-03-08

## 2020-01-06 ENCOUNTER — SLEEP STUDY (OUTPATIENT)
Dept: SLEEP MEDICINE | Facility: MEDICAL CENTER | Age: 70
End: 2020-01-06
Attending: FAMILY MEDICINE
Payer: MEDICARE

## 2020-01-06 DIAGNOSIS — G47.33 OSA (OBSTRUCTIVE SLEEP APNEA): ICD-10-CM

## 2020-01-07 NOTE — PROCEDURES
Technical summary: The patient underwent a split-night polysomnogram. This was a 16 channel montage study to include a 6 channel EEG, a 2 channel EOG, and chin EMG, left and right leg EMG, a snore channel, a nasal pressure transducer, and a nasal oral airflow  thermistor and a CFLOW pressure transducer.   Respiratory effort was assessed with the use of a thoracic and abdominal monitor and overnight oximetry was obtained. Audio and video recordings were reviewed. This was a fully attended study and sleep stage scoring was performed. The test was technically adequate.    Scoring Criteria: A modification of the the AASM Manual for the Scoring of Sleep and Associated Events. Obstructive apnea was scored by cessation of airflow for at least 10 seconds with continuing respiratory effort.  Central apnea was scored by cessation of airflow for at least 10 seconds with no effort.  Hypopnea was scored by a 30% or more reduction in airflow for at least 10 seconds accompanied by an arterial oxygen desaturation of 3% or more.  (For Medicare patients, hypopneas were scored by a 30% or more reduction in airflow for at least 10 seconds accompanied by an arterial oxygen saturation of 4% or more, as required by their insurance, CMS.    Interpretation:  Study start time was 08:41:35 PM.  Total recording time was 3h 22.5m (202 minutes) with a total sleep time of 1h 53.0m (113 minutes) resulting in a sleep efficiency of 55.80%.Sleep latency from the start fo the study was 10 minutes minutes and REM latency from sleep onset was 00 minutes minutes.    Respiratory:   There were 17 apneas in total consisting of 12 obstructive apneas, 0 mixed apneas, and 5 central apneas.  There were 78 hypopneas in total.The apnea index was 9.03 per hour and the hypopnea index was 41.42 per hour.The overall AHI was 50.4, with a REM AHI of 0.00, and a supine AHI of 76.36.    Limb Movements:  There were a total of 140 periodic leg movements, of which 11 were  PLMS arousals.  This resulted in a PLMS index of 74.3 and a PLMS arousal index of 5.8    Oximetry:  The mean SaO2 was 88.0% for the diagnostic portion of the study, with a minimum SaO2 of 77.0%.    Treatment:  Interpretation:  Treatment recording time was 4h 38.5m (278 minutes) with a total sleep time of 3h 16.0m (196 minutes) resulting in a sleep efficiency of 70.4%.  Sleep latency from the start of treatment was 08 minutes minutes and REM latency from sleep onset was 0h 0.0m minutes.  The patient had 86 arousals in total for an arousal index of 26.3.    Respiratory:   There were 39 apneas in total consisting of  0 obstructive apneas, 39 central apneas, and 0 mixed apneas for an apnea index of 11.94.  The patient had 27 hypopneas in total, which resulted in a hypopnea index of 8.27.  The overall AHI was 20.20, with a REM AHI of 1.74, and a supine AHI of 14.55.     59% of the events were central in nature.    Limb Movements:  There were a total of 216 periodic leg movements, of which 22 were PLMS arousals.  This resulted in a PLMS index of 66.1 and a PLMS arousal index of 6.7.    Oximetry:  The mean SaO2 during treatment was 90.0%, with a minimum oxygen saturation of 72.0%.     CPAP was tired from 7 to 11cm H2O. BiPAP was tried from 12/8 to 15/11cm H20.    CPAP Titration:  Due to the significant number of obstructive respiratory events observed during the diagnostic portion of the study a CPAP titration trial was performed during the second half of the night. The CPAP pressure was initiated at 7 cm of water and the pressure was increased in an attempt to eliminate all sleep disordered breathing and snoring. CPAP was increased to 11  Cm before switching to BiPAP. The BiPAP was titrated between 12/8 cm to 15/11 cm. At this final pressure the patient was observed in the supine REM sleep stage.The apnea hypopnea index improved to 8.91/hr with improved O2 nikolay of  72%.    Impression:  1.  Severe obstructive sleep apnea  with AHI of 50.4/hr and O2 nikolay 77 %. Due to severity of the disease he met the split study protocol. The titration started with CPAP 7 cm and the best tolerated was CPAP 15/11 cm. The AHI improved to 8.9/hr with improved O2 nikolay of 72% and average O2 saturation of 90 %.     2.  PLMS  3.  Sleep related hypoxia  4. Treatment emergent central apneas    Recommendations:  No definitive pressure can be extrapolated from the titration, I recommend dedicated BiPAP other wise BiPAP 16/12 cm can be tried. Consider supplemental O2 bleed in and f/u with OPO on the recommended pressure due to residual sleep hypoxia. In some cases alternative treatment options may prove effective in resolving sleep apnea and these options include upper airway surgery, the use of a dental orthotic or weight loss and positional therapy. Clinical correlation is required. In general patients with sleep apnea are advised to avoid alcohol and sedatives and to not operate a motor vehicle while drowsy and are at a greater risk for cardiovascular disease.

## 2020-01-13 ENCOUNTER — SLEEP CENTER VISIT (OUTPATIENT)
Dept: SLEEP MEDICINE | Facility: MEDICAL CENTER | Age: 70
End: 2020-01-13
Payer: MEDICARE

## 2020-01-13 ENCOUNTER — TELEPHONE (OUTPATIENT)
Dept: SLEEP MEDICINE | Facility: MEDICAL CENTER | Age: 70
End: 2020-01-13

## 2020-01-13 VITALS
BODY MASS INDEX: 37.19 KG/M2 | WEIGHT: 315 LBS | HEIGHT: 77 IN | SYSTOLIC BLOOD PRESSURE: 118 MMHG | OXYGEN SATURATION: 96 % | HEART RATE: 60 BPM | DIASTOLIC BLOOD PRESSURE: 70 MMHG | RESPIRATION RATE: 14 BRPM

## 2020-01-13 DIAGNOSIS — G47.33 OSA (OBSTRUCTIVE SLEEP APNEA): ICD-10-CM

## 2020-01-13 DIAGNOSIS — G47.34 SLEEP RELATED HYPOXIA: ICD-10-CM

## 2020-01-13 PROCEDURE — 99213 OFFICE O/P EST LOW 20 MIN: CPT | Performed by: FAMILY MEDICINE

## 2020-01-13 NOTE — PATIENT INSTRUCTIONS
"  CPAP and BIPAP Information  CPAP and BIPAP are methods of helping you breathe with the use of air pressure. CPAP stands for \"continuous positive airway pressure.\" BIPAP stands for \"bi-level positive airway pressure.\" In both methods, air is blown into your air passages to help keep you breathing well. With CPAP, the amount of pressure stays the same while you breathe in and out. CPAP is most commonly used for obstructive sleep apnea. For obstructive sleep apnea, CPAP works by holding your airways open so that they do not collapse when your muscles relax during sleep. BIPAP is similar to CPAP except the amount of pressure is increased when you inhale. This helps you take larger breaths. Your health care provider will recommend whether CPAP or BIPAP would be more helpful for you.   WHY ARE CPAP AND BIPAP TREATMENTS USED?  CPAP or BIPAP can be helpful if you have:   · Sleep apnea.    · Chronic obstructive pulmonary disease (COPD).    · Diseases that weaken the muscles of the chest, including muscular dystrophy or neurological diseases such as amyotrophic lateral sclerosis (ALS).  · Other problems that cause breathing to be weak, abnormal, or difficult.    HOW IS CPAP OR BIPAP ADMINISTERED?  Both CPAP and BIPAP are provided by a small machine with a flexible plastic tube that attaches to a plastic mask. The mask fits on your face, and air is blown into your air passages through your nose or mouth. The amount of pressure that is used to blow the air into your air passages can be set on the machine. Your health care provider will determine the pressure setting that should be used based on your individual needs.  WHEN SHOULD CPAP OR BIPAP BE USED?  In most cases, the mask is worn only when sleeping. Generally, you will need to wear the mask throughout the night and during the daytime if you take a nap. In a few cases involving certain medical conditions, people also need to wear the mask at other times when they are " awake. Follow your health care provider's instructions for when to use the machine.   USING THE MASK  · Because the mask needs to be snug, some people feel a trapped or closed-in feeling (claustrophobic) when first using the mask. You may need to get used to the mask gradually. To do this, you can first hold the mask loosely over your nose or mouth. Gradually apply the mask more snugly. You can also gradually increase the amount of time that you use the mask.  · Masks are available in various types and sizes. Some fit over your mouth and nose, and some fit over just your nose. If your mask does not fit well, talk to your health care provider about getting a different one.  · If you are using a nasal mask and you tend to breathe through your mouth, a chin strap may be applied to help keep your mouth closed.    · The CPAP and BIPAP machines have alarms that may sound if the mask comes off or develops a leak.  · If you have trouble with the mask, it is very important that you talk to your health care provider about finding a way to make the mask easier to tolerate. Do not stop using the mask. This could have a negative impact on your health.  TIPS FOR USING THE MACHINE  · Place your CPAP or BIPAP machine on a secure table or stand near an electrical outlet.    · Know where the on-off switch is located on the machine.  · Follow your health care provider's instructions for how to set the pressure on your machine and when you should use it.  · Do not eat or drink while the CPAP or BIPAP machine is on. Food or fluids could get pushed into your lungs by the pressure of the CPAP or BIPAP.  · Do not smoke. Tobacco smoke residue can damage the machine.    · For home use, CPAP and BIPAP machines can be rented or purchased through home health care companies. Many different brands of machines are available. Renting a machine before purchasing may help you find out which particular machine works well for you.  SEEK IMMEDIATE  MEDICAL CARE IF:  · You have redness or open areas around your nose or mouth where the mask fits.    · You have trouble operating the CPAP or BIPAP machine.    · You cannot tolerate wearing the CPAP or BIPAP mask.    This information is not intended to replace advice given to you by your health care provider. Make sure you discuss any questions you have with your health care provider.  Document Released: 09/15/2005 Document Revised: 01/08/2016 Document Reviewed: 07/17/2014  Elsevier Interactive Patient Education © 2017 [employer]

## 2020-01-13 NOTE — PROGRESS NOTES
[facility] Follow Up Note     Date: 1/13/2020 / Time: 10:18 AM    Patient ID:   Name:             Jhony Rodriguez   YOB: 1950  Age:                 69 y.o.  male   MRN:               [id number]      Thank you for requesting a sleep medicine consultation on Jhony Rodriguez at the sleep center. He presents today with the chief complaints of CARLOS follow up.     HISTORY OF PRESENT ILLNESS:       Pt is currently not on therapy. No change in sleep or medical hx. He goes to sleep around 9:30 pm on weekdays and the weekends. He gets out of bed at 6:15 am on weekdays and on the weekends.  He  averages about 7.5 hrs of sleep on a good night and 6 hrs on a bad night. Overall,  He doesnot finds his sleep refreshing.  The symptoms of CARLOS has conrinued.     Since his last visit he had split night study on 1/6/20 which showed  Severe obstructive sleep apnea with AHI of 50.4/hr and O2 nikolay 77 %. Due to severity of the disease he met the split study protocol. The titration started with CPAP 7 cm and the best tolerated was CPAP 15/11 cm. The AHI improved to 8.9/hr with improved O2 nikolay of 72% and average O2 saturation of 90 %. However he had sleep related hypoxia.    SLEEP HISTORY   Pt was diagnosed with CARLOS in 20102 and had titration study on 12/14/02. Based on the study, the best tolerated pressure was BiPAP 19/15 cm, the AHi improved to 0/hr and and O2 nikolay was 88%.       REVIEW OF SYSTEMS:       Constitutional: Denies fevers, Denies weight changes  Eyes: Denies changes in vision, no eye pain  Ears/Nose/Throat/Mouth: Denies nasal congestion or sore throat   Cardiovascular: Denies chest pain or palpitations   Respiratory: Denies shortness of breath , Denies cough  Gastrointestinal/Hepatic: Denies abdominal pain, nausea, vomiting, diarrhea, constipation or GI bleeding   Genitourinary: Deniesdysuria or frequency  Musculoskeletal/Rheum: Denies  joint pain and swelling   Skin/Breast: Denies  rash,   Neurological: Denies headache, confusion, memory loss or focal weakness/parasthesias  Psychiatric: denies mood disorder   Sleep: + snoring and EDS     Comprehensive review of systems form is reviewed with the patient and is attached in the EMR.     PMH:  has a past medical history of Arthritis, Atrial fibrillation (HCC), Chickenpox, Mongolian measles, H/O cardiac radiofrequency ablation (2006), Influenza, Mumps, Obesity, and Snoring. He also has no past medical history of Anesthesia.  MEDS:   Current Outpatient Medications:   •  tamsulosin (FLOMAX) 0.4 MG capsule, TAKE 1 CAP BY MOUTH ONE-HALF HOUR AFTER BREAKFAST., Disp: 90 Cap, Rfl: 4  •  sulfamethoxazole-trimethoprim (BACTRIM DS) 800-160 MG tablet, TAKE 1 TABLET BY MOUTH TWICE A DAY, Disp: 90 Tab, Rfl: 4  •  Cholecalciferol (VITAMIN D3) 2000 UNIT Cap, Take 1 Cap by mouth See Admin Instructions. 2 times a week, Disp: , Rfl:   •  Cyanocobalamin (B-12) 1000 MCG Tab CR, Take 1 Tab by mouth See Admin Instructions. 2 times a week, Disp: , Rfl:   •  Ferrous Sulfate Dried (SLOW RELEASE IRON) 45 MG Tab CR, Take 1 Tab by mouth See Admin Instructions. 2 times a week, Disp: , Rfl:     Current Facility-Administered Medications:   •  testosterone cypionate (DEPO-TESTOSTERONE) injection 200 mg, 200 mg, Intramuscular, Q14 DAYS, Reinaldo Fenton M.D., 200 mg at 07/23/19 1021  •  testosterone cypionate (DEPO-TESTOSTERONE) injection 200 mg, 200 mg, Intramuscular, Q14 DAYS, Reinaldo Fenton M.D.  ALLERGIES:   Allergies   Allergen Reactions   • Meloxicam Rash     alterred mentation   • Penicillins Rash     Rash  Tolerated cefazolin 5/5/15     SURGHX:   Past Surgical History:   Procedure Laterality Date   • KNEE ARTHROPLASTY TOTAL Left 5/5/2015    Procedure: KNEE ARTHROPLASTY TOTAL ;  Surgeon: Mike Michele M.D.;  Location: [facility];  Service:    • KNEE ARTHROPLASTY TOTAL  1/6/2015    Performed by Mike Michele M.D. at [facility]   • INGUINAL HERNIA  "REPAIR  8/16/2013    Performed by Martin Dillon M.D. at SURGERY [facility] ORS   • OTHER ORTHOPEDIC SURGERY  2006    left knee meniscectomy; dr rhodes   • GASTRIC BYPASS LAPAROSCOPIC  2004    dr. gallo   • LAPAROTOMY  2004    bowel perforation repair, following gastric  bypass   • ARTHROSCOPY, KNEE     • HIP REPLACEMENT, TOTAL     • SLEEVE,CHEMO VASO THIGH       SOCHX:  reports that he quit smoking about 8 years ago. His smoking use included cigars. He smoked 0.00 packs per day for 3.00 years. He has never used smokeless tobacco. He reports current alcohol use. He reports that he does not use drugs..  FH:   Family History   Problem Relation Age of Onset   • Cancer Maternal Aunt    • Cancer Maternal Uncle    • Cancer Maternal Grandfather    • Thyroid Mother    • Heart Disease Mother    • Heart Disease Brother    • Sleep Apnea Neg Hx          Physical Exam:  Vitals/ General Appearance:   Weight/BMI: Body mass index is 39.65 kg/m².  /70 (BP Location: Right arm, Patient Position: Sitting, BP Cuff Size: Adult)   Pulse 60   Resp 14   Ht 1.943 m (6' 4.5\")   Wt (!) 149.7 kg (330 lb)   SpO2 96%   Vitals:    01/13/20 1005   BP: 118/70   BP Location: Right arm   Patient Position: Sitting   BP Cuff Size: Adult   Pulse: 60   Resp: 14   SpO2: 96%   Weight: (!) 149.7 kg (330 lb)   Height: 1.943 m (6' 4.5\")       Pt. is alert and oriented to time, place and person. Cooperative and in no apparent distress.       -Head: Atraumatic, normocephalic.   -. Nose: No inferior turbinate hypertophy, no septal deviation, no polyp.   -. Throat: Oropharynx appears crowded in that the palate is overhanging  pharynx not inflamed.  -. Neck: Supple. No thyromegaly  -. Chest: Trachea central,   -. Lungs auscultation: B/L good air entry, vesicular breath sounds, no adventitious sounds  -. Heart auscultation: 1st and 2nd heart sounds normal, regular rhythm. No appreciable murmur.  - Extremities: no clubbing, no pedal edema.  - Skin: " No rash  - NEUROLOGICAL EXAMINATION: On neurological exam, the patient was alert and oriented x3. speech was clear and fluent without dysarthria.      ASSESSMENT AND PLAN     1. Sleep Apnea      The pathophysiology of sleep anea and the increased risk of cardiovascular morbidity from untreated sleep apnea is discussed in detail with the patient. =     He is urged to avoid supine sleep, weight gain and alcoholic beverages since all of these can worsen sleep apnea. He is cautioned against drowsy driving. If He feels sleepy while driving, He must pull over for a break/nap, rather than persist on the road, in the interest of He own safety and that of others on the road.   Plan   - Auto CPAP vs overnight CPAP titration vs dental appliance and surgeries was dicussed in detail. After informed discussion BiPAP 16/12 cm with O2 bleed in ordered today   - F/u in 8-10 weeks to assess the efficiacy of recommended pressure    - SS was reviewed and discussed with the pt   - compliance was reinforced     2. Regarding treatment of other past medical problems and general health maintenance,  He is urged to follow up with PCP.

## 2020-01-13 NOTE — TELEPHONE ENCOUNTER
Yumiko from [facility] left a message today in regards to this patient but no other info was left. I attempted to call her back at the number provided ([phone]) multiple times but every time it did not ring. I see and order was sent to them today for bipap but not sure why she called since I cant get ahold of her

## 2020-03-12 ENCOUNTER — OFFICE VISIT (OUTPATIENT)
Dept: MEDICAL GROUP | Age: 70
End: 2020-03-12
Payer: MEDICARE

## 2020-03-12 VITALS
HEIGHT: 77 IN | BODY MASS INDEX: 37.19 KG/M2 | SYSTOLIC BLOOD PRESSURE: 114 MMHG | TEMPERATURE: 98.3 F | DIASTOLIC BLOOD PRESSURE: 66 MMHG | HEART RATE: 60 BPM | OXYGEN SATURATION: 94 % | WEIGHT: 315 LBS

## 2020-03-12 DIAGNOSIS — E55.9 VITAMIN D DEFICIENCY: ICD-10-CM

## 2020-03-12 DIAGNOSIS — E53.8 VITAMIN B 12 DEFICIENCY: ICD-10-CM

## 2020-03-12 DIAGNOSIS — E78.5 DYSLIPIDEMIA: ICD-10-CM

## 2020-03-12 DIAGNOSIS — R60.0 BILATERAL LEG EDEMA: ICD-10-CM

## 2020-03-12 DIAGNOSIS — N40.1 BENIGN NODULAR PROSTATIC HYPERPLASIA WITH LOWER URINARY TRACT SYMPTOMS: ICD-10-CM

## 2020-03-12 DIAGNOSIS — E29.1 HYPOGONADISM MALE: ICD-10-CM

## 2020-03-12 DIAGNOSIS — E66.01 CLASS 3 SEVERE OBESITY DUE TO EXCESS CALORIES WITHOUT SERIOUS COMORBIDITY IN ADULT, UNSPECIFIED BMI (HCC): ICD-10-CM

## 2020-03-12 DIAGNOSIS — E61.1 IRON DEFICIENCY: ICD-10-CM

## 2020-03-12 DIAGNOSIS — J96.11 CHRONIC RESPIRATORY FAILURE WITH HYPOXIA (HCC): ICD-10-CM

## 2020-03-12 PROBLEM — E66.9 OBESITY (BMI 30-39.9): Status: RESOLVED | Noted: 2018-02-13 | Resolved: 2020-03-12

## 2020-03-12 PROBLEM — E66.813 CLASS 3 SEVERE OBESITY IN ADULT (HCC): Status: ACTIVE | Noted: 2020-03-12

## 2020-03-12 PROCEDURE — 8041 PR SCP AHA: Performed by: INTERNAL MEDICINE

## 2020-03-12 PROCEDURE — 99214 OFFICE O/P EST MOD 30 MIN: CPT | Performed by: INTERNAL MEDICINE

## 2020-03-12 RX ORDER — POTASSIUM CHLORIDE 750 MG/1
10 TABLET, FILM COATED, EXTENDED RELEASE ORAL DAILY
Qty: 30 TAB | Refills: 3 | Status: SHIPPED | OUTPATIENT
Start: 2020-03-12 | End: 2020-04-08

## 2020-03-12 RX ORDER — FUROSEMIDE 40 MG/1
40 TABLET ORAL DAILY
Qty: 30 TAB | Refills: 3 | Status: SHIPPED | OUTPATIENT
Start: 2020-03-12 | End: 2020-04-08

## 2020-03-12 RX ORDER — SULFAMETHOXAZOLE AND TRIMETHOPRIM 800; 160 MG/1; MG/1
TABLET ORAL
COMMUNITY
End: 2020-03-12

## 2020-03-12 RX ORDER — TAMSULOSIN HYDROCHLORIDE 0.4 MG/1
CAPSULE ORAL
COMMUNITY
End: 2020-03-12

## 2020-03-12 SDOH — HEALTH STABILITY: MENTAL HEALTH: HOW OFTEN DO YOU HAVE A DRINK CONTAINING ALCOHOL?: 2-4 TIMES A MONTH

## 2020-03-12 SDOH — HEALTH STABILITY: MENTAL HEALTH: HOW MANY STANDARD DRINKS CONTAINING ALCOHOL DO YOU HAVE ON A TYPICAL DAY?: 1 OR 2

## 2020-03-12 SDOH — HEALTH STABILITY: MENTAL HEALTH: HOW OFTEN DO YOU HAVE 6 OR MORE DRINKS ON ONE OCCASION?: NEVER

## 2020-03-12 ASSESSMENT — PAIN SCALES - GENERAL: PAINLEVEL: NO PAIN

## 2020-03-12 ASSESSMENT — ENCOUNTER SYMPTOMS
MUSCULOSKELETAL NEGATIVE: 1
ORTHOPNEA: 0
PSYCHIATRIC NEGATIVE: 1
RESPIRATORY NEGATIVE: 1
PND: 0
EYES NEGATIVE: 1
NEUROLOGICAL NEGATIVE: 1
GASTROINTESTINAL NEGATIVE: 1
CONSTITUTIONAL NEGATIVE: 1
PALPITATIONS: 0
CLAUDICATION: 0

## 2020-03-12 ASSESSMENT — PATIENT HEALTH QUESTIONNAIRE - PHQ9: CLINICAL INTERPRETATION OF PHQ2 SCORE: 0

## 2020-03-12 ASSESSMENT — FIBROSIS 4 INDEX: FIB4 SCORE: 1.2

## 2020-03-12 NOTE — PROGRESS NOTES
Subjective:      Jhony Rodriguez is a 69 y.o. male who presents with Follow-Up (hip pain) and Edema (both feet and legs)        HPI    The patient is here for followup of chronic medical problems listed below. The patient is compliant with medications and having no side effects from them. Denies chest pain, abdominal pain, dyspnea, myalgias, or cough.    1. Hypogonadism male  Known history. Patient was previously receiving 200 mg testosterone injections every two weeks for treatment of this problem, however has since stopped as his symptoms of hypogonadism have improved. There are no new changes or concerns reported.    2. Vitamin B 12 deficiency- due to gastric bypass  Patient is taking cyanocobalamin 1000 mcg, which he is tolerating well without side effects. No recent B12 levels are available for comparison.    3. Vitamin D deficiency  He takes 2000 IU's of OTC Vitamin D supplementation.     4. Iron deficiency- due to gastric bypass  Known history thought to be secondary to this gastric bypass surgery. His most recent blood work from September 2019 showed normal ferritin and iron levels.    5. Benign nodular prostatic hyperplasia with lower urinary tract symptoms- controlled with prn tamsulosin 2-3x/wk  Patient is taking tamsulosin 0.4 mg 2-3 times per week, which he is tolerating well without side effects. No new changes or complaints are reported.    6. Class 3 severe obesity due to excess calories without serious comorbidity in adult, unspecified BMI (HCC)  Known history. His BMI today in office is 40 putting him in in the morbidly obese index.    7. Dyslipidemia  He continues to manage his dyslipidemia through his own efforts. Most recent blood work from 5/23/19 showed the patients LDL was borderline elevated at 101, however all other levels were within normal limits.    8. Chronic respiratory failure with hypoxia (HCC)- nocturnal o2  He notes that he was previously having difficulties with sleeping  secondary to CARLOS. He was fitted with a CPAP however this was not effective at fully treating his symptoms, and he has since started on a BIPAP. He now reports sleeping well throughout the night and is no longer snoring.    9. Bilateral leg edema  Patient reports that over the last 2-3 weeks he noticed that his legs and feet had been swelling. He has been self treating this through diet modifications consistent with a mediterranean style diet. Since doing this, his swelling has been improving, however is still present. He denies any chest pain or shortness of breath.      Patient reports a recent onset of right hip pain         Patient Active Problem List   Diagnosis   • S/P RF ablation operation for arrhythmia-2008; no recurrence; no meds   • Gastric bypass status for obesity-2005 dr gallo   • Vitamin B 12 deficiency- due to gastric bypass   • History of obstructive sleep apnea- resolved with weight loss   • Iron deficiency- due to gastric bypass   • Benign nodular prostatic hyperplasia with lower urinary tract symptoms- controlled with prn tamsulosin 2-3x/wk   • Amnesia, global, transient- 2008, resolved; no recurrence; possibly due to due to medication interaction- celebrex and mobic     • Need for 23-polyvalent pneumococcal polysaccharide vaccine- tbd fall 2018   • Prophylactic antibiotic- for dental work due to TKRs   • Vitamin D deficiency   • Hypogonadism male   • Primary osteoarthritis of right hip   • CARLOS treated with BiPAP   • Class 3 severe obesity in adult (HCC)   • Dyslipidemia   • Chronic respiratory failure with hypoxia (HCC)- nocturnal o2   • Bilateral leg edema       Outpatient Medications Prior to Visit   Medication Sig Dispense Refill   • tamsulosin (FLOMAX) 0.4 MG capsule TAKE 1 CAP BY MOUTH ONE-HALF HOUR AFTER BREAKFAST. 90 Cap 4   • sulfamethoxazole-trimethoprim (BACTRIM DS) 800-160 MG tablet TAKE 1 TABLET BY MOUTH TWICE A DAY 90 Tab 4   • Cholecalciferol (VITAMIN D3) 2000 UNIT Cap Take 1 Cap by  "mouth See Admin Instructions. 2 times a week     • Cyanocobalamin (B-12) 1000 MCG Tab CR Take 1 Tab by mouth See Admin Instructions. 2 times a week     • Ferrous Sulfate Dried (SLOW RELEASE IRON) 45 MG Tab CR Take 1 Tab by mouth See Admin Instructions. 2 times a week     • sulfamethoxazole-trimethoprim (BACTRIM DS) 800-160 MG tablet sulfamethoxazole 800 mg-trimethoprim 160 mg tablet     • tamsulosin (FLOMAX) 0.4 MG capsule tamsulosin 0.4 mg capsule       Facility-Administered Medications Prior to Visit   Medication Dose Route Frequency Provider Last Rate Last Dose   • testosterone cypionate (DEPO-TESTOSTERONE) injection 200 mg  200 mg Intramuscular Q14 DAYS Reinaldo Fenton M.D.   200 mg at 07/23/19 1021   • testosterone cypionate (DEPO-TESTOSTERONE) injection 200 mg  200 mg Intramuscular Q14 DAYS Reinaldo Fenton M.D.            Allergies   Allergen Reactions   • Meloxicam Rash     alterred mentation   • Penicillins Rash     Rash  Tolerated cefazolin 5/5/15       Review of Systems   Constitutional: Negative.    HENT: Negative.    Eyes: Negative.    Respiratory: Negative.    Cardiovascular: Positive for leg swelling. Negative for chest pain, palpitations, orthopnea, claudication and PND.   Gastrointestinal: Negative.    Genitourinary: Negative.    Musculoskeletal: Negative.    Skin: Negative.    Neurological: Negative.    Endo/Heme/Allergies: Negative.    Psychiatric/Behavioral: Negative.    All other systems reviewed and are negative.           Objective:     /66 (BP Location: Right arm, Patient Position: Sitting, BP Cuff Size: Adult long)   Pulse 60   Temp 36.8 °C (98.3 °F) (Temporal)   Ht 1.943 m (6' 4.5\")   Wt (!) 150.6 kg (332 lb)   SpO2 94%   BMI 39.89 kg/m²     Physical Exam   Constitutional: Oriented to person, place, and time. Obese, Appears well-developed No distress.   Head: Normocephalic and atraumatic.   Right Ear: External ear normal.   Left Ear: External ear normal.   Nose: Nose normal. "   Mouth/Throat: Oropharynx is clear and moist. No oropharyngeal exudate.   Eyes: Pupils are equal, round, and reactive to light. Conjunctivae and EOM are normal. Right eye exhibits no discharge. Left eye exhibits no discharge. No scleral icterus.   Neck: Normal range of motion. Neck supple. No JVD present. No tracheal deviation present. No thyromegaly present.   Cardiovascular: Normal rate, regular rhythm, normal heart sounds and intact distal pulses. Exam reveals no gallop and no friction rub. No murmur heard.  Pulmonary/Chest: Effort normal. No stridor. No respiratory distress. No wheezing or rales. No tenderness.   Abdominal: Soft. Bowel sounds are normal. No distension and no mass. There is no tenderness. There is no rebound and no guarding. No hernia.   Musculoskeletal: Normal range of motion, no tenderness, 4+ pretibial edema to the bilateral lower extremities, no calf tenderness  Lymphadenopathy: No cervical adenopathy.   Neurological: Alert and oriented to person, place, and time. Normal reflexes. Normal reflexes. No cranial nerve deficit. Normal muscle tone. Coordination normal.   Skin: Skin is warm and dry. No rash noted. Not diaphoretic. No erythema. No pallor.   Psychiatric: Normal mood and affect. Behavior is normal. Judgment and thought content normal.   Nursing note and vitals reviewed.      Lab Results   Component Value Date/Time    HBA1C 5.3 09/25/2019 08:38 AM    HBA1C 5.5 02/05/2018 01:38 PM     Lab Results   Component Value Date/Time    SODIUM 137 09/25/2019 08:38 AM    POTASSIUM 4.7 09/25/2019 08:38 AM    CHLORIDE 105 09/25/2019 08:38 AM    CO2 28 09/25/2019 08:38 AM    GLUCOSE 92 09/25/2019 08:38 AM    BUN 21 09/25/2019 08:38 AM    CREATININE 0.94 09/25/2019 08:38 AM    CREATININE 0.77 12/06/2010 08:03 AM    BUNCREATRAT 31 (H) 06/21/2016 09:57 AM    BUNCREATRAT 36 (H) 12/06/2010 08:03 AM    GLOMRATE >59 12/06/2010 08:03 AM    ALKPHOSPHAT 64 09/25/2019 08:38 AM    ASTSGOT 21 09/25/2019 08:38 AM     ALTSGPT 24 09/25/2019 08:38 AM    TBILIRUBIN 0.7 09/25/2019 08:38 AM     Lab Results   Component Value Date/Time    INR 1.07 02/05/2018 01:38 PM    INR 1.03 10/21/2013 12:12 PM    INR 1.51 (H) 06/12/2008 06:30 AM     Lab Results   Component Value Date/Time    CHOLSTRLTOT 180 05/23/2019 08:27 AM     (H) 05/23/2019 08:27 AM    HDL 65 05/23/2019 08:27 AM    TRIGLYCERIDE 68 05/23/2019 08:27 AM       Lab Results   Component Value Date/Time    TESTOSTERONE 463 09/25/2019 08:38 AM     Lab Results   Component Value Date/Time    TSH 2.700 06/21/2016 09:57 AM    TSH 3.190 07/11/2013 07:33 AM     Lab Results   Component Value Date/Time    FREET4 0.81 07/07/2014 09:54 AM    FREET4 0.89 10/21/2013 12:12 PM     No results found for: URICACID  No components found for: VITB12  Lab Results   Component Value Date/Time    25HYDROXY 26 (L) 09/25/2019 08:38 AM    25HYDROXY 13 (L) 05/23/2019 08:27 AM          Assessment/Plan:     1. Hypogonadism male  Patient has been stable with current management. We will make no changes for now, and plan to re-check his testosterone levels on his next set of labs.  - CBC WITH DIFFERENTIAL; Future  - Comp Metabolic Panel; Future  - TSH; Future  - TESTOSTERONE, FREE AND TOTAL; Future  - PROSTATE SPECIFIC AG DIAGNOSTIC; Future    2. Vitamin B 12 deficiency- due to gastric bypass  Under good control. Continue same regimen of cyanocobalamin  - CBC WITH DIFFERENTIAL; Future  - VITAMIN B12; Future    3. Vitamin D deficiency  Patient's Vitamin D level is decreased at 26. Advised to continue current OTC supplementation of 2000 IU's daily.   - VITAMIN D,25 HYDROXY; Future    4. Iron deficiency- due to gastric bypass  Patients Ferritin and Iron levels are within normal limits. Patient advised to continue current OTC iron supplementation of 45 mg  - CBC WITH DIFFERENTIAL; Future  - IRON/TOTAL IRON BIND; Future  - FERRITIN; Future    5. Benign nodular prostatic hyperplasia with lower urinary tract  symptoms- controlled with prn tamsulosin 2-3x/wk  Under good control. Continue same regimen of tamsulosin    6. Class 3 severe obesity due to excess calories without serious comorbidity in adult, unspecified BMI (HCC)  diet/exercise/lose 15 lbs.; patient counseled    7. Dyslipidemia  His lipid panel shows borderline elevated LDL. He will continue to manage this through his own efforts. I have advised the patient to increase his exercise regimen and avoid fatty foods. Labs have been ordered for the next follow up visit.   - Lipid Profile; Future    8. Chronic respiratory failure with hypoxia (HCC)- nocturnal o2  Stable on current BIPAP use. We will make no changes at this time.    9. Bilateral leg edema  Patient will start taking lasix and potassium chloride as prescribed to help reduce the swelling in his legs. Patient is also given orders for a follow up ultrasound of his legs to look for any emergent concerns that may be contributing to his symptoms.  - US-EXTREMITY VENOUS LOWER BILAT; Future  - furosemide (LASIX) 40 MG Tab; Take 1 Tab by mouth every day.  Dispense: 30 Tab; Refill: 3  - potassium chloride ER (KLOR-CON) 10 MEQ tablet; Take 1 Tab by mouth every day.  Dispense: 30 Tab; Refill: 3      Vic AGUIRRE (Scribjuan), am scribing for, and in the presence of, Reinaldo Fenton M.D..    Electronically signed by: Vic Hernandez (Amish), 3/12/2020    Reinaldo AGUIRRE M.D., personally performed the services described in this documentation, as scribed by Vic Hernandez in my presence, and it is both accurate and complete.

## 2020-03-17 ENCOUNTER — SLEEP CENTER VISIT (OUTPATIENT)
Dept: SLEEP MEDICINE | Facility: MEDICAL CENTER | Age: 70
End: 2020-03-17
Payer: MEDICARE

## 2020-03-17 VITALS
SYSTOLIC BLOOD PRESSURE: 118 MMHG | HEART RATE: 64 BPM | OXYGEN SATURATION: 98 % | DIASTOLIC BLOOD PRESSURE: 70 MMHG | HEIGHT: 77 IN | BODY MASS INDEX: 37.19 KG/M2 | WEIGHT: 315 LBS | RESPIRATION RATE: 14 BRPM

## 2020-03-17 DIAGNOSIS — G47.33 OSA TREATED WITH BIPAP: ICD-10-CM

## 2020-03-17 DIAGNOSIS — G47.34 SLEEP RELATED HYPOXIA: ICD-10-CM

## 2020-03-17 PROCEDURE — 99213 OFFICE O/P EST LOW 20 MIN: CPT | Performed by: FAMILY MEDICINE

## 2020-03-17 ASSESSMENT — FIBROSIS 4 INDEX: FIB4 SCORE: 1.2

## 2020-03-17 NOTE — PROGRESS NOTES
[facility] Follow Up Note     Date: 3/17/2020 / Time: 3:10 PM    Patient ID:   Name:             Jhony Rodriguez   YOB: 1950  Age:                 69 y.o.  male   MRN:               [id number]      Thank you for requesting a sleep medicine consultation on Jhony Rodriguez at the sleep center. He presents today with the chief complaints of OAS and 1st compliance follow up.     HISTORY OF PRESENT ILLNESS:       Pt is currently on BiPAP 16/12 cm with O2 bleed in at 2L/min. He goes to sleep around 9:30-10:30 pm and wakes up around 6:30 am. He is getting about 8+ hrs of sleep on a good night.  Overall,  He does finds his sleep refreshing since he has been back on PAP therapy. He rarely takes a nap these days. He is using BiPAP most days of the week. Pt reports 6+ hrs of average nightly use of BiPAP. He occasionally takes it off at 5 am and sleeps another hour w/o the BiPAP.Pt denies snoring, gasping,choking. The 30 day compliance was downloaded which shows adequate compliance with more that 4 hr usage about 100%. The AHI is has improved to 5/hr. The mask leak is normal.  The symptoms of CARLOS has improved along with mental clarity.         SLEEP HISTORY   Split night study on 1/6/20 which showed  Severe obstructive sleep apnea with AHI of 50.4/hr and O2 nikolay 77 %. Due to severity of the disease he met the split study protocol. The titration started with CPAP 7 cm and the best tolerated was CPAP 15/11 cm. The AHI improved to 8.9/hr with improved O2 nikolay of 72% and average O2 saturation of 90 %. However he had sleep related hypoxia.    He was initially diagnosed in 2012 and had titration study on 12/14/02. Based on the study, the best tolerated pressure was BiPAP 19/15 cm, the AHi improved to 0/hr and and O2 nikolay was 88%.   REVIEW OF SYSTEMS:       Constitutional: Denies fevers, Denies weight changes  Ears/Nose/Throat/Mouth: Denies nasal congestion or sore throat   Cardiovascular:  Denies chest pain or palpitations   Respiratory: Denies shortness of breath , Denies cough  Gastrointestinal/Hepatic: Denies abdominal pain, nausea,   Musculoskeletal/Rheum: Denies  joint pain and swelling   Skin/Breast: Denies rash,   Neurological: Denies headache, confusion,   Psychiatric: denies mood disorder   Sleep: denies snoring     Comprehensive review of systems form is reviewed with the patient and is attached in the EMR.     PMH:  has a past medical history of Arthritis, Atrial fibrillation (HCC), Chickenpox, Pashto measles, H/O cardiac radiofrequency ablation (2006), Influenza, Mumps, Obesity, Obesity (BMI 30-39.9) (2/13/2018), and Snoring. He also has no past medical history of Anesthesia.  MEDS:   Current Outpatient Medications:   •  furosemide (LASIX) 40 MG Tab, Take 1 Tab by mouth every day., Disp: 30 Tab, Rfl: 3  •  potassium chloride ER (KLOR-CON) 10 MEQ tablet, Take 1 Tab by mouth every day., Disp: 30 Tab, Rfl: 3  •  tamsulosin (FLOMAX) 0.4 MG capsule, TAKE 1 CAP BY MOUTH ONE-HALF HOUR AFTER BREAKFAST., Disp: 90 Cap, Rfl: 4  •  sulfamethoxazole-trimethoprim (BACTRIM DS) 800-160 MG tablet, TAKE 1 TABLET BY MOUTH TWICE A DAY, Disp: 90 Tab, Rfl: 4  •  Cholecalciferol (VITAMIN D3) 2000 UNIT Cap, Take 1 Cap by mouth See Admin Instructions. 2 times a week, Disp: , Rfl:   •  Cyanocobalamin (B-12) 1000 MCG Tab CR, Take 1 Tab by mouth See Admin Instructions. 2 times a week, Disp: , Rfl:   •  Ferrous Sulfate Dried (SLOW RELEASE IRON) 45 MG Tab CR, Take 1 Tab by mouth See Admin Instructions. 2 times a week, Disp: , Rfl:     Current Facility-Administered Medications:   •  testosterone cypionate (DEPO-TESTOSTERONE) injection 200 mg, 200 mg, Intramuscular, Q14 DAYS, Reinaldo Fenton M.D., 200 mg at 07/23/19 1021  •  testosterone cypionate (DEPO-TESTOSTERONE) injection 200 mg, 200 mg, Intramuscular, Q14 DAYS, Reinaldo Fenton M.D.  ALLERGIES:   Allergies   Allergen Reactions   • Meloxicam Rash     alterred  "mentation   • Penicillins Rash     Rash  Tolerated cefazolin 5/5/15     SURGHX:   Past Surgical History:   Procedure Laterality Date   • KNEE ARTHROPLASTY TOTAL Left 5/5/2015    Procedure: KNEE ARTHROPLASTY TOTAL ;  Surgeon: Mike Michele M.D.;  Location: SURGERY [facility];  Service:    • KNEE ARTHROPLASTY TOTAL  1/6/2015    Performed by Mike Michele M.D. at SURGERY [facility]   • INGUINAL HERNIA REPAIR  8/16/2013    Performed by Martin Dillon M.D. at SURGERY [facility]   • OTHER ORTHOPEDIC SURGERY  2006    left knee meniscectomy; dr rhodes   • GASTRIC BYPASS LAPAROSCOPIC  2004    dr. gallo   • LAPAROTOMY  2004    bowel perforation repair, following gastric  bypass   • ARTHROSCOPY, KNEE     • HIP REPLACEMENT, TOTAL     • SLEEVE,CHEMO VASO THIGH       SOCHX:  reports that he has never smoked. He has never used smokeless tobacco. He reports current alcohol use of about 1.2 oz of alcohol per week. He reports that he does not use drugs..  FH:   Family History   Problem Relation Age of Onset   • Cancer Maternal Aunt    • Cancer Maternal Uncle    • Cancer Maternal Grandfather    • Thyroid Mother    • Heart Disease Mother    • Heart Disease Brother    • Sleep Apnea Neg Hx          Physical Exam:  Vitals/ General Appearance:   Weight/BMI: Body mass index is 38.8 kg/m².  /70 (BP Location: Left arm, Patient Position: Sitting, BP Cuff Size: Adult)   Pulse 64   Resp 14   Ht 1.943 m (6' 4.5\")   Wt (!) 146.5 kg (323 lb)   SpO2 98%   Vitals:    03/17/20 1506   BP: 118/70   BP Location: Left arm   Patient Position: Sitting   BP Cuff Size: Adult   Pulse: 64   Resp: 14   SpO2: 98%   Weight: (!) 146.5 kg (323 lb)   Height: 1.943 m (6' 4.5\")       Pt. is alert and oriented to time, place and person. Cooperative and in no apparent distress.       -Head: Atraumatic, normocephalic.   -. Throat: Oropharynx appears crowded in that the palate is overhanging (Malam Natalie scale 3)  -. Neck: Supple. -. Chest: " Trachea central  -. Lungs auscultation: B/L good air entry, vesicular breath sounds, no adventitious sounds  -. Heart auscultation: 1st and 2nd heart sounds normal, regular rhythm. No appreciable murmur.  - Extremities: no clubbing, no pedal edema.  - Skin: No rash  - NEUROLOGICAL EXAMINATION: On neurological exam, the patient was alert and oriented x3. speech was clear and fluent without dysarthria.      ASSESSMENT AND PLAN     1. Sleep Apnea      He is urged to avoid supine sleep, weight gain and alcoholic beverages since all of these can worsen sleep apnea. He is cautioned against drowsy driving. If He feels sleepy while driving, He must pull over for a break/nap, rather than persist on the road, in the interest of He own safety and that of others on the road.   Plan   - Continue BiPAP at 16/12 cm with FFM   - OPO is ordered today on BiPAP only because pt would like to d/c supplemental O2    - compliance download was reviewed and discussed with the pt   - compliance was reinforced     2. Regarding treatment of other past medical problems and general health maintenance,  He is urged to follow up with PCP.

## 2020-03-18 ENCOUNTER — APPOINTMENT (OUTPATIENT)
Dept: SLEEP MEDICINE | Facility: MEDICAL CENTER | Age: 70
End: 2020-03-18
Attending: FAMILY MEDICINE
Payer: MEDICARE

## 2020-03-25 ENCOUNTER — HOME STUDY (OUTPATIENT)
Dept: SLEEP MEDICINE | Facility: MEDICAL CENTER | Age: 70
End: 2020-03-25
Attending: FAMILY MEDICINE
Payer: MEDICARE

## 2020-03-25 DIAGNOSIS — G47.33 OSA TREATED WITH BIPAP: ICD-10-CM

## 2020-03-25 PROCEDURE — 94762 N-INVAS EAR/PLS OXIMTRY CONT: CPT | Performed by: FAMILY MEDICINE

## 2020-03-26 NOTE — PROCEDURES
Over Night Pulse Oximetry     Indication:To assess the efficacy of the current pressure.       Impression:   The study was done on  BiPAP 16/12 cm. The total analyzed time was 8 hrs 0 min. O2 Sat. nikolay was 76% and mean O2 sat was 86 % and baseline O2 at 93%. O2 sat was below 88% for 28 min of the flow evaluation time. Oxygen Desaturation (>=3%) Index was elevated at 4.9/hr.      Recommendation:  The O2 nikolay and time below 88% might be an artifact. Clinical correlation recommended.

## 2020-04-02 ENCOUNTER — HOSPITAL ENCOUNTER (EMERGENCY)
Facility: MEDICAL CENTER | Age: 70
End: 2020-04-02
Attending: EMERGENCY MEDICINE
Payer: MEDICARE

## 2020-04-02 VITALS
TEMPERATURE: 98.2 F | HEIGHT: 77 IN | HEART RATE: 58 BPM | BODY MASS INDEX: 37.19 KG/M2 | SYSTOLIC BLOOD PRESSURE: 144 MMHG | WEIGHT: 315 LBS | DIASTOLIC BLOOD PRESSURE: 66 MMHG | OXYGEN SATURATION: 97 % | RESPIRATION RATE: 18 BRPM

## 2020-04-02 DIAGNOSIS — I49.9 IRREGULAR HEART RHYTHM: ICD-10-CM

## 2020-04-02 LAB
ALBUMIN SERPL BCP-MCNC: 4 G/DL (ref 3.2–4.9)
ALBUMIN/GLOB SERPL: 2 G/DL
ALP SERPL-CCNC: 72 U/L (ref 30–99)
ALT SERPL-CCNC: 16 U/L (ref 2–50)
ANION GAP SERPL CALC-SCNC: 9 MMOL/L (ref 7–16)
AST SERPL-CCNC: 22 U/L (ref 12–45)
BASOPHILS # BLD AUTO: 0.9 % (ref 0–1.8)
BASOPHILS # BLD: 0.06 K/UL (ref 0–0.12)
BILIRUB SERPL-MCNC: 0.4 MG/DL (ref 0.1–1.5)
BUN SERPL-MCNC: 19 MG/DL (ref 8–22)
CALCIUM SERPL-MCNC: 8.9 MG/DL (ref 8.4–10.2)
CHLORIDE SERPL-SCNC: 105 MMOL/L (ref 96–112)
CO2 SERPL-SCNC: 25 MMOL/L (ref 20–33)
CREAT SERPL-MCNC: 0.69 MG/DL (ref 0.5–1.4)
EKG IMPRESSION: NORMAL
EOSINOPHIL # BLD AUTO: 0.13 K/UL (ref 0–0.51)
EOSINOPHIL NFR BLD: 1.9 % (ref 0–6.9)
ERYTHROCYTE [DISTWIDTH] IN BLOOD BY AUTOMATED COUNT: 42.2 FL (ref 35.9–50)
GLOBULIN SER CALC-MCNC: 2 G/DL (ref 1.9–3.5)
GLUCOSE SERPL-MCNC: 92 MG/DL (ref 65–99)
HCT VFR BLD AUTO: 44 % (ref 42–52)
HGB BLD-MCNC: 14 G/DL (ref 14–18)
IMM GRANULOCYTES # BLD AUTO: 0.02 K/UL (ref 0–0.11)
IMM GRANULOCYTES NFR BLD AUTO: 0.3 % (ref 0–0.9)
LYMPHOCYTES # BLD AUTO: 0.63 K/UL (ref 1–4.8)
LYMPHOCYTES NFR BLD: 9.1 % (ref 22–41)
MCH RBC QN AUTO: 28.7 PG (ref 27–33)
MCHC RBC AUTO-ENTMCNC: 31.8 G/DL (ref 33.7–35.3)
MCV RBC AUTO: 90.2 FL (ref 81.4–97.8)
MONOCYTES # BLD AUTO: 0.45 K/UL (ref 0–0.85)
MONOCYTES NFR BLD AUTO: 6.5 % (ref 0–13.4)
NEUTROPHILS # BLD AUTO: 5.65 K/UL (ref 1.82–7.42)
NEUTROPHILS NFR BLD: 81.3 % (ref 44–72)
NRBC # BLD AUTO: 0 K/UL
NRBC BLD-RTO: 0 /100 WBC
PLATELET # BLD AUTO: 228 K/UL (ref 164–446)
PMV BLD AUTO: 10.4 FL (ref 9–12.9)
POTASSIUM SERPL-SCNC: 4.7 MMOL/L (ref 3.6–5.5)
PROT SERPL-MCNC: 6 G/DL (ref 6–8.2)
RBC # BLD AUTO: 4.88 M/UL (ref 4.7–6.1)
SODIUM SERPL-SCNC: 139 MMOL/L (ref 135–145)
TROPONIN T SERPL-MCNC: 8 NG/L (ref 6–19)
WBC # BLD AUTO: 6.9 K/UL (ref 4.8–10.8)

## 2020-04-02 PROCEDURE — 93005 ELECTROCARDIOGRAM TRACING: CPT

## 2020-04-02 PROCEDURE — 99284 EMERGENCY DEPT VISIT MOD MDM: CPT

## 2020-04-02 PROCEDURE — 85025 COMPLETE CBC W/AUTO DIFF WBC: CPT

## 2020-04-02 PROCEDURE — 80053 COMPREHEN METABOLIC PANEL: CPT

## 2020-04-02 PROCEDURE — 36415 COLL VENOUS BLD VENIPUNCTURE: CPT

## 2020-04-02 PROCEDURE — 84484 ASSAY OF TROPONIN QUANT: CPT

## 2020-04-02 PROCEDURE — 93005 ELECTROCARDIOGRAM TRACING: CPT | Performed by: EMERGENCY MEDICINE

## 2020-04-02 ASSESSMENT — FIBROSIS 4 INDEX: FIB4 SCORE: 1.2

## 2020-04-02 NOTE — ED TRIAGE NOTES
"\"Feeling faint, felt like heart was fibrillating, looked at nails and thought they looked cyanotic\" since about 1230. Hx of Afib and successful ablation.     Pt denies respiratory symptoms, fever, or known covid 19 exposure. Pt does not meet isolation criteria.  "

## 2020-04-03 NOTE — ED PROVIDER NOTES
ED Provider Note    CHIEF COMPLAINT  Chief Complaint   Patient presents with   • Irregular Heart Beat       HPI  Jhony Garcia Rodriguez is a 69 y.o. male who presents to the emergency room today with complaints of irregular heartbeat and left arm pain.  She states he was watching TV at home around 2 PM after eating developed irregular heartbeat.  He has some shortness of breath with this and felt some pain to his left arm this resolved by the time he arrived to the emergency room he does have a history of atrial fibrillation in the past but had ablation performed approximately 7-8 years ago.  He has had no cough or congestion no exposure to Covid or travel outside the local area.  No fever chills.  By the time he arrived here the symptoms had resolved and he feels much improved and is asymptomatic at this time.    REVIEW OF SYSTEMS  See HPI for further details. All other systems are negative.     PAST MEDICAL HISTORY  Past Medical History:   Diagnosis Date   • Obesity (BMI 30-39.9) 2/13/2018   • H/O cardiac radiofrequency ablation 2006   • Arthritis     osteo knees   • Atrial fibrillation (HCC)    • Chickenpox    • [place] measles    • Influenza    • Mumps    • Obesity    • Snoring     has had sleep study no cpap       FAMILY HISTORY  [unfilled]    SOCIAL HISTORY  Social History     Socioeconomic History   • Marital status:      Spouse name: Not on file   • Number of children: Not on file   • Years of education: Not on file   • Highest education level: Not on file   Occupational History   • Not on file   Social Needs   • Financial resource strain: Not on file   • Food insecurity     Worry: Not on file     Inability: Not on file   • Transportation needs     Medical: Not on file     Non-medical: Not on file   Tobacco Use   • Smoking status: Never Smoker   • Smokeless tobacco: Never Used   Substance and Sexual Activity   • Alcohol use: Yes     Alcohol/week: 1.2 oz     Types: 1 Glasses of wine, 1 Cans of beer per  week     Frequency: 2-4 times a month     Drinks per session: 1 or 2     Binge frequency: Never     Comment: 1 per week   • Drug use: No   • Sexual activity: Yes     Partners: Female   Lifestyle   • Physical activity     Days per week: Not on file     Minutes per session: Not on file   • Stress: Not on file   Relationships   • Social connections     Talks on phone: Not on file     Gets together: Not on file     Attends Rastafarian service: Not on file     Active member of club or organization: Not on file     Attends meetings of clubs or organizations: Not on file     Relationship status: Not on file   • Intimate partner violence     Fear of current or ex partner: Not on file     Emotionally abused: Not on file     Physically abused: Not on file     Forced sexual activity: Not on file   Other Topics Concern   • Not on file   Social History Narrative   • Not on file       SURGICAL HISTORY  Past Surgical History:   Procedure Laterality Date   • KNEE ARTHROPLASTY TOTAL Left 5/5/2015    Procedure: KNEE ARTHROPLASTY TOTAL ;  Surgeon: Mike Michele M.D.;  Location: SURGERY [facility];  Service:    • KNEE ARTHROPLASTY TOTAL  1/6/2015    Performed by Mike Michele M.D. at SURGERY [facility]   • INGUINAL HERNIA REPAIR  8/16/2013    Performed by Martin Dillon M.D. at SURGERY [facility]   • OTHER ORTHOPEDIC SURGERY  2006    left knee meniscectomy; dr rhodes   • GASTRIC BYPASS LAPAROSCOPIC  2004    dr. gallo   • LAPAROTOMY  2004    bowel perforation repair, following gastric  bypass   • ARTHROSCOPY, KNEE     • HIP REPLACEMENT, TOTAL     • SLEEVE,CHEMO VASO THIGH         CURRENT MEDICATIONS  Home Medications     Reviewed by Mirella Bland R.N. (Registered Nurse) on 04/02/20 at 1538  Med List Status: Complete   Medication Last Dose Status   Cholecalciferol (VITAMIN D3) 2000 UNIT Cap  Active   Cyanocobalamin (B-12) 1000 MCG Tab CR  Active   Ferrous Sulfate Dried (SLOW RELEASE IRON) 45 MG Tab CR  Active  "  furosemide (LASIX) 40 MG Tab Not Taking Active   potassium chloride ER (KLOR-CON) 10 MEQ tablet Not Taking Active   sulfamethoxazole-trimethoprim (BACTRIM DS) 800-160 MG tablet  Active   tamsulosin (FLOMAX) 0.4 MG capsule  Active                ALLERGIES  Allergies   Allergen Reactions   • Meloxicam Rash     alterred mentation   • Penicillins Rash     Rash  Tolerated cefazolin 5/5/15       PHYSICAL EXAM  VITAL SIGNS: /66   Pulse (!) 58   Temp 36.8 °C (98.2 °F)   Resp 18   Ht 1.943 m (6' 4.5\")   Wt (!) 150.6 kg (332 lb 0.2 oz)   SpO2 97%   BMI 39.89 kg/m²       Constitutional: Well developed, Well nourished, mild acute distress, Non-toxic appearance.   HENT: Normocephalic, Atraumatic, Bilateral external ears normal, Oropharynx moist, No oral exudates, Nose normal.   Eyes: PERRLA, EOMI, Conjunctiva normal, No discharge.   Neck: Normal range of motion, No tenderness, Supple, No stridor.   Lymphatic: No lymphadenopathy noted.   Cardiovascular: Normal heart rate, Normal rhythm, No murmurs, No rubs, No gallops.   Thorax & Lungs: Normal breath sounds, No respiratory distress, No wheezing, No chest tenderness.   Abdomen: Bowel sounds normal, Soft, No tenderness, No masses, No pulsatile masses.   Skin: Warm, Dry, No erythema, No rash.  Stasis dermatitis noted to the lower extremity  Back: No tenderness, No CVA tenderness.    Extremities: Intact distal pulses, 1-2+ bilateral lower extremity edema patient states this is not new, No tenderness, No cyanosis, No clubbing.   Musculoskeletal: Good range of motion in all major joints. No tenderness to palpation or major deformities noted.   Neurologic: Alert & oriented x 3, Normal motor function, Normal sensory function, No focal deficits noted.   Psychiatric: Affect normal, Judgment normal, Mood normal.     EKG  Normal sinus rhythm at 60 bpm, no ST elevation or depression, there is left axis deviation, poor R wave progression, widening of the QRS consistent with left " bundle branch block, OH interval's are normal.  There is a 12-lead EKG as compared to previous EKG performed 2/2018 there is no interval change,    RADIOLOGY/PROCEDURES  No orders to display         COURSE & MEDICAL DECISION MAKING  Pertinent Labs & Imaging studies reviewed. (See chart for details)  Patient may have a cardiac arrhythmia he was able to feel his pulse he has some medical knowledge from felt that it was irregular lasted only a short period of time or time arrived here he is in sinus rhythm we discussed decreasing caffeine products continue on his medications.  He may need follow-up with a primary care physician and set up for Holter monitor and outpatient stress test.  He has been asymptomatic this time he has heart score of 3 I do not feel inpatient treatment necessary patient can follow-up outpatient he is agreeable at this time we reviewed the blood test with him troponin normal.  I do not feel that he has had ischemic as he has normal EKG and troponin and had no chest pain.  Patient verbalizes above instructions need for follow-up as described.  Return if worse comes back or developed chest pain, shortness of breath or worsening symptoms or next 12 to 24 hours until he can follow-up with his primary care physician.    FINAL IMPRESSION  1.  Acute cardiac arrhythmia  2.   3.         Electronically signed by: Eduardo Back D.O., 4/2/2020 6:03 PM

## 2020-04-06 ENCOUNTER — HOSPITAL ENCOUNTER (OUTPATIENT)
Dept: LAB | Facility: MEDICAL CENTER | Age: 70
End: 2020-04-06
Attending: INTERNAL MEDICINE
Payer: MEDICARE

## 2020-04-06 DIAGNOSIS — E29.1 HYPOGONADISM MALE: ICD-10-CM

## 2020-04-06 DIAGNOSIS — E61.1 IRON DEFICIENCY: ICD-10-CM

## 2020-04-06 DIAGNOSIS — E78.5 DYSLIPIDEMIA: ICD-10-CM

## 2020-04-06 DIAGNOSIS — E53.8 VITAMIN B 12 DEFICIENCY: ICD-10-CM

## 2020-04-06 DIAGNOSIS — E55.9 VITAMIN D DEFICIENCY: ICD-10-CM

## 2020-04-06 LAB
25(OH)D3 SERPL-MCNC: 24 NG/ML (ref 30–100)
ALBUMIN SERPL BCP-MCNC: 4.2 G/DL (ref 3.2–4.9)
ALBUMIN/GLOB SERPL: 1.8 G/DL
ALP SERPL-CCNC: 77 U/L (ref 30–99)
ALT SERPL-CCNC: 17 U/L (ref 2–50)
ANION GAP SERPL CALC-SCNC: 11 MMOL/L (ref 7–16)
AST SERPL-CCNC: 18 U/L (ref 12–45)
BASOPHILS # BLD AUTO: 0.9 % (ref 0–1.8)
BASOPHILS # BLD: 0.05 K/UL (ref 0–0.12)
BILIRUB SERPL-MCNC: 0.6 MG/DL (ref 0.1–1.5)
BUN SERPL-MCNC: 21 MG/DL (ref 8–22)
CALCIUM SERPL-MCNC: 8.9 MG/DL (ref 8.5–10.5)
CHLORIDE SERPL-SCNC: 102 MMOL/L (ref 96–112)
CHOLEST SERPL-MCNC: 174 MG/DL (ref 100–199)
CO2 SERPL-SCNC: 26 MMOL/L (ref 20–33)
CREAT SERPL-MCNC: 0.81 MG/DL (ref 0.5–1.4)
EOSINOPHIL # BLD AUTO: 0.16 K/UL (ref 0–0.51)
EOSINOPHIL NFR BLD: 2.8 % (ref 0–6.9)
ERYTHROCYTE [DISTWIDTH] IN BLOOD BY AUTOMATED COUNT: 41.9 FL (ref 35.9–50)
FASTING STATUS PATIENT QL REPORTED: NORMAL
FERRITIN SERPL-MCNC: 61.6 NG/ML (ref 22–322)
GLOBULIN SER CALC-MCNC: 2.4 G/DL (ref 1.9–3.5)
GLUCOSE SERPL-MCNC: 89 MG/DL (ref 65–99)
HCT VFR BLD AUTO: 46.9 % (ref 42–52)
HDLC SERPL-MCNC: 73 MG/DL
HGB BLD-MCNC: 15 G/DL (ref 14–18)
IMM GRANULOCYTES # BLD AUTO: 0.02 K/UL (ref 0–0.11)
IMM GRANULOCYTES NFR BLD AUTO: 0.4 % (ref 0–0.9)
IRON SATN MFR SERPL: 24 % (ref 15–55)
IRON SERPL-MCNC: 80 UG/DL (ref 50–180)
LDLC SERPL CALC-MCNC: 90 MG/DL
LYMPHOCYTES # BLD AUTO: 0.62 K/UL (ref 1–4.8)
LYMPHOCYTES NFR BLD: 11 % (ref 22–41)
MCH RBC QN AUTO: 29.2 PG (ref 27–33)
MCHC RBC AUTO-ENTMCNC: 32 G/DL (ref 33.7–35.3)
MCV RBC AUTO: 91.2 FL (ref 81.4–97.8)
MONOCYTES # BLD AUTO: 0.42 K/UL (ref 0–0.85)
MONOCYTES NFR BLD AUTO: 7.4 % (ref 0–13.4)
NEUTROPHILS # BLD AUTO: 4.39 K/UL (ref 1.82–7.42)
NEUTROPHILS NFR BLD: 77.5 % (ref 44–72)
NRBC # BLD AUTO: 0 K/UL
NRBC BLD-RTO: 0 /100 WBC
PLATELET # BLD AUTO: 221 K/UL (ref 164–446)
PMV BLD AUTO: 10.7 FL (ref 9–12.9)
POTASSIUM SERPL-SCNC: 4.2 MMOL/L (ref 3.6–5.5)
PROT SERPL-MCNC: 6.6 G/DL (ref 6–8.2)
PSA SERPL-MCNC: 0.26 NG/ML (ref 0–4)
RBC # BLD AUTO: 5.14 M/UL (ref 4.7–6.1)
SODIUM SERPL-SCNC: 139 MMOL/L (ref 135–145)
TIBC SERPL-MCNC: 338 UG/DL (ref 250–450)
TRIGL SERPL-MCNC: 55 MG/DL (ref 0–149)
TSH SERPL DL<=0.005 MIU/L-ACNC: 3.33 UIU/ML (ref 0.38–5.33)
UIBC SERPL-MCNC: 258 UG/DL (ref 110–370)
VIT B12 SERPL-MCNC: 420 PG/ML (ref 211–911)
WBC # BLD AUTO: 5.7 K/UL (ref 4.8–10.8)

## 2020-04-06 PROCEDURE — 82607 VITAMIN B-12: CPT

## 2020-04-06 PROCEDURE — 84443 ASSAY THYROID STIM HORMONE: CPT

## 2020-04-06 PROCEDURE — 82306 VITAMIN D 25 HYDROXY: CPT

## 2020-04-06 PROCEDURE — 83550 IRON BINDING TEST: CPT

## 2020-04-06 PROCEDURE — 85025 COMPLETE CBC W/AUTO DIFF WBC: CPT

## 2020-04-06 PROCEDURE — 83540 ASSAY OF IRON: CPT

## 2020-04-06 PROCEDURE — 36415 COLL VENOUS BLD VENIPUNCTURE: CPT

## 2020-04-06 PROCEDURE — 84403 ASSAY OF TOTAL TESTOSTERONE: CPT

## 2020-04-06 PROCEDURE — 84153 ASSAY OF PSA TOTAL: CPT

## 2020-04-06 PROCEDURE — 80053 COMPREHEN METABOLIC PANEL: CPT

## 2020-04-06 PROCEDURE — 80061 LIPID PANEL: CPT

## 2020-04-06 PROCEDURE — 84270 ASSAY OF SEX HORMONE GLOBUL: CPT

## 2020-04-06 PROCEDURE — 82728 ASSAY OF FERRITIN: CPT

## 2020-04-06 PROCEDURE — 84402 ASSAY OF FREE TESTOSTERONE: CPT

## 2020-04-07 LAB
SHBG SERPL-SCNC: 101 NMOL/L (ref 11–80)
TESTOST FREE MFR SERPL: 0.9 % (ref 1.6–2.9)
TESTOST FREE SERPL-MCNC: 37 PG/ML (ref 47–244)
TESTOST SERPL-MCNC: 438 NG/DL (ref 300–720)

## 2020-04-08 ENCOUNTER — OFFICE VISIT (OUTPATIENT)
Dept: URGENT CARE | Facility: CLINIC | Age: 70
End: 2020-04-08
Payer: MEDICARE

## 2020-04-08 VITALS
OXYGEN SATURATION: 92 % | HEIGHT: 77 IN | DIASTOLIC BLOOD PRESSURE: 80 MMHG | TEMPERATURE: 98.5 F | BODY MASS INDEX: 37.19 KG/M2 | WEIGHT: 315 LBS | SYSTOLIC BLOOD PRESSURE: 122 MMHG | HEART RATE: 60 BPM | RESPIRATION RATE: 18 BRPM

## 2020-04-08 DIAGNOSIS — H01.004 BLEPHARITIS OF LEFT UPPER EYELID, UNSPECIFIED TYPE: ICD-10-CM

## 2020-04-08 PROCEDURE — 99214 OFFICE O/P EST MOD 30 MIN: CPT | Performed by: NURSE PRACTITIONER

## 2020-04-08 RX ORDER — AZITHROMYCIN 250 MG/1
TABLET, FILM COATED ORAL
Qty: 6 TAB | Refills: 0 | Status: SHIPPED | OUTPATIENT
Start: 2020-04-08 | End: 2020-04-13

## 2020-04-08 ASSESSMENT — FIBROSIS 4 INDEX: FIB4 SCORE: 1.32

## 2020-04-08 ASSESSMENT — ENCOUNTER SYMPTOMS
DIZZINESS: 0
PHOTOPHOBIA: 0
NAUSEA: 0
DOUBLE VISION: 0
EYE DISCHARGE: 0
BLURRED VISION: 0
FEVER: 0
VOMITING: 0
CHILLS: 0
EYE PAIN: 1
HEADACHES: 0
EYE ITCHING: 1
FOREIGN BODY SENSATION: 0
EYE REDNESS: 1

## 2020-04-08 ASSESSMENT — VISUAL ACUITY: OU: 1

## 2020-04-08 NOTE — PROGRESS NOTES
Subjective:   Jhony Rodriguez  is a 69 y.o. male who presents for Eye Problem (Couple days left eyelid swollen and red, )        Eye Problem    The left eye is affected. This is a new problem. Episode onset: 4 days ago. The problem occurs constantly. The problem has been gradually worsening. The injury mechanism is unknown. The pain is at a severity of 3/10. The pain is mild. There is no known exposure to pink eye. He does not wear contacts. Associated symptoms include eye redness and itching. Pertinent negatives include no blurred vision, eye discharge, double vision, fever, foreign body sensation, nausea, photophobia, recent URI or vomiting. Associated symptoms comments: Patient reports urgent care for new problem.  States that 4 days ago he started developing bilateral eye redness.  His left eye seems to be getting worse with associated upper eyelid swelling.  Patient denies any foreign body sensation or known mechanism of injury.  Patient states he does wear BiPAP at night and is wondering if he did not quite have a seal at the bridge of his nose causing pressurized air to blow up into his eyes all night.  Patient also states that he may have turned over on his side causing the tip of the BiPAP mask to rub into his eye.  Patient denies any fever, chills, nausea or vomiting vision changes but does admit to some pain when he looks upward in the left eye.  Patient has not been using anything over-the-counter or prescription for symptom relief..     Review of Systems   Constitutional: Negative for chills and fever.   Eyes: Positive for pain, redness and itching. Negative for blurred vision, double vision, photophobia and discharge.   Gastrointestinal: Negative for nausea and vomiting.   Neurological: Negative for dizziness and headaches.     Past Medical History:   Diagnosis Date   • Arthritis     osteo knees   • Atrial fibrillation (HCC)    • Chickenpox    • [place] measles    • H/O cardiac radiofrequency  ablation 2006   • Influenza    • Mumps    • Obesity    • Obesity (BMI 30-39.9) 2/13/2018   • Snoring     has had sleep study no cpap      Past Surgical History:   Procedure Laterality Date   • KNEE ARTHROPLASTY TOTAL Left 5/5/2015    Procedure: KNEE ARTHROPLASTY TOTAL ;  Surgeon: Mike Michele M.D.;  Location: SURGERY [facility];  Service:    • KNEE ARTHROPLASTY TOTAL  1/6/2015    Performed by Mike Michele M.D. at SURGERY [facility]   • INGUINAL HERNIA REPAIR  8/16/2013    Performed by Martin Dillon M.D. at SURGERY [facility]   • OTHER ORTHOPEDIC SURGERY  2006    left knee meniscectomy; dr rhodes   • GASTRIC BYPASS LAPAROSCOPIC  2004    dr. gallo   • LAPAROTOMY  2004    bowel perforation repair, following gastric  bypass   • ARTHROSCOPY, KNEE     • HIP REPLACEMENT, TOTAL     • SLEEVE,CHEMO VASO THIGH        Social History     Socioeconomic History   • Marital status:      Spouse name: Not on file   • Number of children: Not on file   • Years of education: Not on file   • Highest education level: Not on file   Occupational History   • Not on file   Social Needs   • Financial resource strain: Not on file   • Food insecurity     Worry: Not on file     Inability: Not on file   • Transportation needs     Medical: Not on file     Non-medical: Not on file   Tobacco Use   • Smoking status: Never Smoker   • Smokeless tobacco: Never Used   Substance and Sexual Activity   • Alcohol use: Yes     Alcohol/week: 1.2 oz     Types: 1 Glasses of wine, 1 Cans of beer per week     Frequency: 2-4 times a month     Drinks per session: 1 or 2     Binge frequency: Never     Comment: 1 per week   • Drug use: No   • Sexual activity: Yes     Partners: Female   Lifestyle   • Physical activity     Days per week: Not on file     Minutes per session: Not on file   • Stress: Not on file   Relationships   • Social connections     Talks on phone: Not on file     Gets together: Not on file     Attends Spiritism service:  "Not on file     Active member of club or organization: Not on file     Attends meetings of clubs or organizations: Not on file     Relationship status: Not on file   • Intimate partner violence     Fear of current or ex partner: Not on file     Emotionally abused: Not on file     Physically abused: Not on file     Forced sexual activity: Not on file   Other Topics Concern   • Not on file   Social History Narrative   • Not on file    Meloxicam and Penicillins       Objective:   /80   Pulse 60   Temp 36.9 °C (98.5 °F) (Temporal)   Resp 18   Ht 1.943 m (6' 4.5\")   Wt (!) 150.6 kg (332 lb)   SpO2 92%   BMI 39.89 kg/m²   Physical Exam  Vitals signs reviewed.   Constitutional:       Appearance: Normal appearance.   Eyes:      General: Lids are everted, no foreign bodies appreciated. Vision grossly intact. Gaze aligned appropriately. No visual field deficit.     Extraocular Movements: Extraocular movements intact.      Conjunctiva/sclera:      Right eye: Right conjunctiva is injected. No exudate or hemorrhage.     Left eye: Left conjunctiva is injected. No exudate or hemorrhage.     Pupils: Pupils are equal, round, and reactive to light.      Visual Fields: Right eye visual fields normal and left eye visual fields normal.        Comments: Examination under fluorescein did not reveal any corneal abrasions    Vision testin/25 OU   Cardiovascular:      Rate and Rhythm: Normal rate and regular rhythm.      Heart sounds: Normal heart sounds.   Pulmonary:      Effort: Pulmonary effort is normal.      Breath sounds: Normal breath sounds.   Skin:     General: Skin is warm.   Neurological:      Mental Status: He is alert and oriented to person, place, and time.   Psychiatric:         Mood and Affect: Mood normal.         Behavior: Behavior normal.         Thought Content: Thought content normal.         Judgment: Judgment normal.           Assessment/Plan:      1. Blepharitis of left upper eyelid, unspecified " type  - azithromycin (ZITHROMAX) 250 MG Tab; Take two tablets on the first day and then one tablet for the next four days  Dispense: 6 Tab; Refill: 0    Discussed physical examination findings as well as length of patient symptoms and possible mechanism of injury from BiPAP mask with patient.  Will prescribe patient with oral antibiotics and instructed him to follow-up with optometrist/ophthalmologist if symptoms persist after 24 to 36 hours.  Provided patient with a phone number for [facility] in [place].  Discussed good handwashing, warm water compresses and additional lubricating eyedrops like refresh tears    Supportive care, differential diagnoses, and indications for immediate follow-up discussed with patient.    Pathogenesis of diagnosis discussed including typical length and natural progression. Patient expresses understanding and agrees to plan.    Instructed patient to return to clinic for worsening symptoms or symptoms that persist for 7 to 10 days     Please note that this dictation was created using voice recognition software. I have made every reasonable attempt to correct obvious errors, but I expect that there are errors of grammar and possibly content that I did not discover before finalizing the note.

## 2020-04-09 ENCOUNTER — TELEPHONE (OUTPATIENT)
Dept: MEDICAL GROUP | Age: 70
End: 2020-04-09

## 2020-04-09 NOTE — TELEPHONE ENCOUNTER
ESTABLISHED PATIENT PRE-VISIT PLANNING     Patient was NOT contacted to complete PVP.     Note: Patient will not be contacted if there is no indication to call.     1.  Reviewed notes from the last few office visits within the medical group: Yes    2.  If any orders were placed at last visit or intended to be done for this visit (i.e. 6 mos follow-up), do we have Results/Consult Notes?        •  Labs - Labs ordered, completed on 4/6/2020 and results are in chart.   Note: If patient appointment is for lab review and patient did not complete labs, check with provider if OK to reschedule patient until labs completed.       •  Imaging - Imaging ordered, NOT completed. Patient advised to complete prior to next appointment.       •  Referrals - No referrals were ordered at last office visit.    3. Is this appointment scheduled as a Hospital Follow-Up? No    4.  Immunizations were updated in Epic using WebIZ?: Epic matches WebIZ       •  Web Iz Recommendations: SHINGRIX (Shingles)    5.  Patient is due for the following Health Maintenance Topics:   Health Maintenance Due   Topic Date Due   • HEPATITIS C SCREENING  1950   • IMM ZOSTER VACCINES (1 of 2) 07/19/2002           6. Orders for overdue Health Maintenance topics pended in Pre-Charting? N\A    7.  AHA (MDX) form printed for Provider? No, already completed    8.  Patient was NOT informed to arrive 15 min prior to their scheduled appointment and bring in their medication bottles.

## 2020-04-13 ENCOUNTER — TELEMEDICINE (OUTPATIENT)
Dept: MEDICAL GROUP | Age: 70
End: 2020-04-13
Payer: MEDICARE

## 2020-04-13 VITALS — BODY MASS INDEX: 37.19 KG/M2 | WEIGHT: 315 LBS | HEIGHT: 77 IN

## 2020-04-13 DIAGNOSIS — E66.01 CLASS 3 SEVERE OBESITY IN ADULT, UNSPECIFIED BMI, UNSPECIFIED OBESITY TYPE, UNSPECIFIED WHETHER SERIOUS COMORBIDITY PRESENT (HCC): ICD-10-CM

## 2020-04-13 DIAGNOSIS — E78.5 DYSLIPIDEMIA: ICD-10-CM

## 2020-04-13 DIAGNOSIS — R60.0 BILATERAL LEG EDEMA: ICD-10-CM

## 2020-04-13 DIAGNOSIS — N40.1 BENIGN NODULAR PROSTATIC HYPERPLASIA WITH LOWER URINARY TRACT SYMPTOMS: ICD-10-CM

## 2020-04-13 DIAGNOSIS — Z96.653 S/P TOTAL KNEE ARTHROPLASTY, BILATERAL: ICD-10-CM

## 2020-04-13 DIAGNOSIS — E29.1 HYPOGONADISM MALE: ICD-10-CM

## 2020-04-13 DIAGNOSIS — M16.11 PRIMARY OSTEOARTHRITIS OF RIGHT HIP: ICD-10-CM

## 2020-04-13 DIAGNOSIS — J96.11 CHRONIC RESPIRATORY FAILURE WITH HYPOXIA (HCC): ICD-10-CM

## 2020-04-13 DIAGNOSIS — E55.9 VITAMIN D DEFICIENCY: ICD-10-CM

## 2020-04-13 DIAGNOSIS — G47.33 OSA TREATED WITH BIPAP: ICD-10-CM

## 2020-04-13 DIAGNOSIS — E53.8 VITAMIN B 12 DEFICIENCY: ICD-10-CM

## 2020-04-13 DIAGNOSIS — H01.004 BLEPHARITIS OF LEFT UPPER EYELID, UNSPECIFIED TYPE: ICD-10-CM

## 2020-04-13 PROCEDURE — 99214 OFFICE O/P EST MOD 30 MIN: CPT | Mod: CR,95 | Performed by: INTERNAL MEDICINE

## 2020-04-13 RX ORDER — AZITHROMYCIN 250 MG/1
1000 TABLET, FILM COATED ORAL
Qty: 4 TAB | Refills: 4
Start: 2020-04-13 | End: 2020-04-14

## 2020-04-13 ASSESSMENT — FIBROSIS 4 INDEX: FIB4 SCORE: 1.32

## 2020-04-13 ASSESSMENT — ENCOUNTER SYMPTOMS
MUSCULOSKELETAL NEGATIVE: 1
CONSTITUTIONAL NEGATIVE: 1
NEUROLOGICAL NEGATIVE: 1
PSYCHIATRIC NEGATIVE: 1
EYES NEGATIVE: 1
GASTROINTESTINAL NEGATIVE: 1
CARDIOVASCULAR NEGATIVE: 1
RESPIRATORY NEGATIVE: 1

## 2020-04-13 NOTE — PROGRESS NOTES
Subjective:      Telemedicine Video Visit: Established Patient   This Remote Face to Face encounter was conducted via Zoom. Given the importance of social distancing and other strategies recommended to reduce the risk of COVID-19 transmission, I am providing medical care to this patient via audio/video visit in place of an in person visit at the request of the patient. Verbal consent to telehealth, risks, benefits, and consequences were discussed. Patient retains the right to withdraw at any time. All existing confidentiality protections apply. The patient has access to all transmitted medical information. No dissemination of any patient images or information to other entities without further written consent.  Subjective:     Chief Complaint   Patient presents with   • Follow-Up   • Lab Results       Jhony Rodriguez is a 69 y.o. male presenting for evaluation and management of:          ROS    Denies any recent fevers or chills. No nausea or vomiting. No chest pains or shortness of breath.     Allergies   Allergen Reactions   • Meloxicam Rash     alterred mentation   • Penicillins Rash     Rash  Tolerated cefazolin 5/5/15       Current medicines (including changes today)  Current Outpatient Medications   Medication Sig Dispense Refill   • azithromycin (ZITHROMAX) 250 MG Tab Take 4 Tabs by mouth Pre-Op Once for 1 dose. 4 Tab 4   • tamsulosin (FLOMAX) 0.4 MG capsule TAKE 1 CAP BY MOUTH ONE-HALF HOUR AFTER BREAKFAST. 90 Cap 4   • Cholecalciferol (VITAMIN D3) 2000 UNIT Cap Take 1 Cap by mouth See Admin Instructions. 2 times a week     • Cyanocobalamin (B-12) 1000 MCG Tab CR Take 1 Tab by mouth See Admin Instructions. 2 times a week     • Ferrous Sulfate Dried (SLOW RELEASE IRON) 45 MG Tab CR Take 1 Tab by mouth See Admin Instructions. 2 times a week       No current facility-administered medications for this visit.        Patient Active Problem List    Diagnosis Date Noted   • S/P RF ablation operation for  arrhythmia-2008; no recurrence; no meds 12/10/2010     Priority: Medium   • Benign nodular prostatic hyperplasia with lower urinary tract symptoms- controlled with prn tamsulosin 2-3x/wk 01/19/2017     Priority: Low   • S/P total knee arthroplasty, bilateral 04/13/2020   • Class 3 severe obesity in adult (HCC) 03/12/2020   • Dyslipidemia 03/12/2020   • Chronic respiratory failure with hypoxia (HCC)- nocturnal o2 03/12/2020   • Bilateral leg edema 03/12/2020   • CARLOS treated with BiPAP 09/12/2019   • Primary osteoarthritis of right hip- THR tbd 5/2020-n dr barraza 05/23/2019   • Hypogonadism male 05/22/2019   • Need for 23-polyvalent pneumococcal polysaccharide vaccine- tbd fall 2018 09/14/2017   • Prophylactic antibiotic- for dental work due to TKRs 09/14/2017   • Vitamin D deficiency 09/14/2017   • Iron deficiency- due to gastric bypass 01/19/2017   • Amnesia, global, transient- 2008, resolved; no recurrence; possibly due to due to medication interaction- celebrex and mobic   01/19/2017   • Vitamin B 12 deficiency- due to gastric bypass 06/20/2016   • History of obstructive sleep apnea- resolved with weight loss 06/20/2016   • Gastric bypass status for obesity-2005 dr gallo 11/19/2012       Family History   Problem Relation Age of Onset   • Cancer Maternal Aunt    • Cancer Maternal Uncle    • Cancer Maternal Grandfather    • Thyroid Mother    • Heart Disease Mother    • Heart Disease Brother    • Sleep Apnea Neg Hx        He  has a past medical history of Arthritis, Atrial fibrillation (HCC), Chickenpox, Palestinian measles, H/O cardiac radiofrequency ablation (2006), Influenza, Mumps, Obesity, Obesity (BMI 30-39.9) (2/13/2018), and Snoring. He also has no past medical history of Anesthesia.  He  has a past surgical history that includes gastric bypass laparoscopic (2004); other orthopedic surgery (2006); inguinal hernia repair (8/16/2013); laparotomy (2004); knee arthroplasty total (1/6/2015); knee arthroplasty total  "(Left, 5/5/2015); Sleeve,Ismael Vaso Thigh; hip replacement, total; and arthroscopy, knee.       Objective:   Vitals obtained by patient:  Ht 1.943 m (6' 4.5\")   Wt (!) 147.4 kg (325 lb)   BMI 39.04 kg/m²     Physical Exam:   Constitutional: Alert, no distress, well-groomed.  Skin: No rashes in visible areas.  Eye: Round. Conjunctiva clear, lids normal. No icterus.   ENMT: Lips pink without lesions, good dentition, moist mucous membranes. Phonation normal.  Neck: No masses, no thyromegaly. Moves freely without pain.  CV: Pulse as reported by patient  Respiratory: Unlabored respiratory effort, no cough or audible wheeze  Psych: Alert and oriented x3, normal affect and mood.       Assessment and Plan:   The following treatment plan was discussed:     1. Benign nodular prostatic hyperplasia with lower urinary tract symptoms- controlled with prn tamsulosin 2-3x/wk    2. Chronic respiratory failure with hypoxia (HCC)- nocturnal o2    3. Class 3 severe obesity in adult, unspecified BMI, unspecified obesity type, unspecified whether serious comorbidity present (HCC)    4. Vitamin D deficiency    5. CARLOS treated with BiPAP    6. Dyslipidemia    7. Bilateral leg edema    8. Hypogonadism male    9. Vitamin B 12 deficiency- due to gastric bypass    10. Blepharitis of left upper eyelid, unspecified type    11. Primary osteoarthritis of right hip- THR tbd 5/2020-n dr barraza    12. S/P total knee arthroplasty, bilateral  - azithromycin (ZITHROMAX) 250 MG Tab; Take 4 Tabs by mouth Pre-Op Once for 1 dose.  Dispense: 4 Tab; Refill: 4        Follow-up: No follow-ups on file.    Face to Face Video Visit:   I spent  30  minutes with patient/guardian and I conducted this visit with audio and video present.  Reinaldo Fenton M.D.          Sukhdeep Rodriguez is a 69 y.o. male who presents with Follow-Up and Lab Results  Patient is here to follow-up multiple medical problems listed below including BPH, DJD right hip and both knees " status post bilateral, vitamin B12 and iron deficiency and vitamin D deficiency, morbid obesity, hypogonadism, CARLOS on Pap and nocturnal oxygen for nocturnal respiratory, and by edema.  Since elevating his legs and walking and using support hose and losing the edema has improved.  He takes furosemide for a few weeks but found himself urinating too often use it.  The edema seems to be much better now.    His right hip pain is under good control Tylenol PM but it is so severe he is going to require a total hip replacement in 2 months which is scheduled for May 2020.  Both doing well status post bilateral knee replacement and he continues to take his antibiotics preoperatively for this.  Graft his energy level has improved with the addition of BiPAP at an oxygen at.  Is helping a weight reduction.  Continues to follow-up with [facility] for this.    His BPH symptoms of headache are controlled with Flomax.  No dysuria urgency.    With regard to his hypogonadism.  Patient stopped his testosterone since it was affecting or improving his sexual drive issues.  He has not any change in his vision vigor or the whether he is on or not.  I mentioned that this is consistent with recent research fine at testosterone is only beneficial for people with erectile dysfunction or other sexual issues such as loss of libido if he wants to resume and he will let us know.  But we will continue to.enc follow it.  His lab indicates no increased hemoglobin level or PSA while on testosterone.    Relevant labs reviewed all of which were remarkable including normal renal function liver function, and no evidence of anemia or iron deficiency or B12 or vitamin D deficiency.       Outpatient Medications Prior to Visit   Medication Sig Dispense Refill   • tamsulosin (FLOMAX) 0.4 MG capsule TAKE 1 CAP BY MOUTH ONE-HALF HOUR AFTER BREAKFAST. 90 Cap 4   • Cholecalciferol (VITAMIN D3) 2000 UNIT Cap Take 1 Cap by mouth See Admin Instructions. 2 times a week     •  Cyanocobalamin (B-12) 1000 MCG Tab CR Take 1 Tab by mouth See Admin Instructions. 2 times a week     • Ferrous Sulfate Dried (SLOW RELEASE IRON) 45 MG Tab CR Take 1 Tab by mouth See Admin Instructions. 2 times a week     • azithromycin (ZITHROMAX) 250 MG Tab Take two tablets on the first day and then one tablet for the next four days 6 Tab 0     No facility-administered medications prior to visit.          Allergies   Allergen Reactions   • Meloxicam Rash     alterred mentation   • Penicillins Rash     Rash  Tolerated cefazolin 5/5/15     Lab Results   Component Value Date/Time    HBA1C 5.3 09/25/2019 08:38 AM    HBA1C 5.5 02/05/2018 01:38 PM     Lab Results   Component Value Date/Time    SODIUM 139 04/06/2020 08:22 AM    POTASSIUM 4.2 04/06/2020 08:22 AM    CHLORIDE 102 04/06/2020 08:22 AM    CO2 26 04/06/2020 08:22 AM    GLUCOSE 89 04/06/2020 08:22 AM    BUN 21 04/06/2020 08:22 AM    CREATININE 0.81 04/06/2020 08:22 AM    CREATININE 0.77 12/06/2010 08:03 AM    BUNCREATRAT 31 (H) 06/21/2016 09:57 AM    BUNCREATRAT 36 (H) 12/06/2010 08:03 AM    GLOMRATE >59 12/06/2010 08:03 AM    ALKPHOSPHAT 77 04/06/2020 08:22 AM    ASTSGOT 18 04/06/2020 08:22 AM    ALTSGPT 17 04/06/2020 08:22 AM    TBILIRUBIN 0.6 04/06/2020 08:22 AM     Lab Results   Component Value Date/Time    INR 1.07 02/05/2018 01:38 PM    INR 1.03 10/21/2013 12:12 PM    INR 1.51 (H) 06/12/2008 06:30 AM     Lab Results   Component Value Date/Time    CHOLSTRLTOT 174 04/06/2020 08:22 AM    LDL 90 04/06/2020 08:22 AM    HDL 73 04/06/2020 08:22 AM    TRIGLYCERIDE 55 04/06/2020 08:22 AM       Lab Results   Component Value Date/Time    TESTOSTERONE 438 04/06/2020 08:22 AM     Lab Results   Component Value Date/Time    TSH 2.700 06/21/2016 09:57 AM    TSH 3.190 07/11/2013 07:33 AM     Lab Results   Component Value Date/Time    FREET4 0.81 07/07/2014 09:54 AM    FREET4 0.89 10/21/2013 12:12 PM     No results found for: URICACID  No components found for: VITB12  Lab  "Results   Component Value Date/Time    25HYDROXY 24 (L) 04/06/2020 08:22 AM    25HYDROXY 26 (L) 09/25/2019 08:38 AM    .alll                      HPI    Review of Systems   Constitutional: Negative.    HENT: Negative.    Eyes: Negative.    Respiratory: Negative.    Cardiovascular: Negative.    Gastrointestinal: Negative.    Genitourinary: Negative.    Musculoskeletal: Negative.    Skin: Negative.    Neurological: Negative.    Endo/Heme/Allergies: Negative.    Psychiatric/Behavioral: Negative.           Objective:     Ht 1.943 m (6' 4.5\")   Wt (!) 147.4 kg (325 lb)   BMI 39.04 kg/m²      Physical Exam             Assessment/Plan:        1. Benign nodular prostatic hyperplasia with lower urinary tract symptoms- controlled with prn tamsulosin 2-3x/wk    This is under good control with Flomax.  Continue unchanged.        2. Chronic respiratory failure with hypoxia (HCC)- nocturnal o2    This under good control with nocturnal and off.  Continue unchanged.  Continue follow-up with [facility].  She is consistent      3. Class 3 severe obesity in adult, unspecified BMI, unspecified obesity type, unspecified whether serious comorbidity present (HCC)    This is not well controlled.  Work on diet exercise protein low-carb diet.  Try to get down to 3 pound will be another 30 pound weight reduction.    4. Vitamin D deficiency    Control with vitamin D supplements..  This is not well controlled.  He will increase his vitamin D check level again in 6 months      5. CARLOS treated with BiPAP  Under good control. Continue same regimen.        6. Dyslipidemia    7. Bilateral leg edema        8. Hypogonadism male  Under good control. Continue same regimen.        9. Vitamin B 12 deficiency- due to gastric bypass    2 Under good control. Continue same regimen.  10. Blepharitis of left upper eyelid, unspecified type  2     This has resolved with over-the-counter eyedrops.  And also with previous prescription for antibiotics and eyedrops " antibiotic  11. Primary osteoarthritis of right hip- THR tbd 5/2020-n dr barraza      2    As above.  Continue Tylenol until then.  12. S/P total knee arthroplasty, bilateral  Under good control.  No significant pain Tylenol.  So    - azithromycin (ZITHROMAX) 250 MG Tab; Take 4 Tabs by mouth Pre-Op Once for 1 dose.  Dispense: 4 Tab; Refill: 4

## 2020-04-20 ENCOUNTER — PATIENT MESSAGE (OUTPATIENT)
Dept: MEDICAL GROUP | Age: 70
End: 2020-04-20

## 2020-04-20 DIAGNOSIS — G47.34 NOCTURNAL HYPOXIA: ICD-10-CM

## 2020-04-20 DIAGNOSIS — Z01.818 PREOP TESTING: ICD-10-CM

## 2020-04-21 NOTE — PATIENT COMMUNICATION
Preop chest x-ray and EKG ordered.  Scheduling should call you to get those done.  You just had blood work done on April 6 which showed no significant abnormalities.  This should be sufficient for surgery preop testing since it will be within the 1 month required timeframe prior to surgery.  But if they delay your surgery you may need to have repeat orders done according to what ever your surgeon feels this indicated, and which he can order..   I do not need any blood work for at least 6 months.

## 2020-05-13 ENCOUNTER — PATIENT OUTREACH (OUTPATIENT)
Dept: HEALTH INFORMATION MANAGEMENT | Facility: OTHER | Age: 70
End: 2020-05-13

## 2020-05-15 ENCOUNTER — PATIENT OUTREACH (OUTPATIENT)
Dept: HEALTH INFORMATION MANAGEMENT | Facility: OTHER | Age: 70
End: 2020-05-15

## 2020-05-15 NOTE — PROGRESS NOTES
Pt called regarding his Tramadol rx from his orthopedic doctor. He needed the quantity changed so that it would be covered by [facility]. He had called about this before but did not hear back from his pharmacy. I called [facility] and they verified that the quantity was changed to 30 pills which will be covered by [facility]. I called the pt back to give him this information. He also needed help with his [employer] reimbursement. He stated they reimburse the premium payment he pays to [facility] and they have not done so yet for this month. He wanted to make sure the statement was sent to them from [facility]. I let him know I will reach out to the premium payment dept and have them verify.

## 2020-05-17 DIAGNOSIS — Z01.812 PRE-OPERATIVE LABORATORY EXAMINATION: ICD-10-CM

## 2020-05-17 LAB — COVID ORDER STATUS COVID19: NORMAL

## 2020-05-17 PROCEDURE — C9803 HOPD COVID-19 SPEC COLLECT: HCPCS

## 2020-05-18 DIAGNOSIS — Z01.812 PRE-OPERATIVE LABORATORY EXAMINATION: ICD-10-CM

## 2020-05-18 LAB
ANION GAP SERPL CALC-SCNC: 10 MMOL/L (ref 7–16)
BUN SERPL-MCNC: 19 MG/DL (ref 8–22)
CALCIUM SERPL-MCNC: 9 MG/DL (ref 8.4–10.2)
CHLORIDE SERPL-SCNC: 105 MMOL/L (ref 96–112)
CO2 SERPL-SCNC: 25 MMOL/L (ref 20–33)
CREAT SERPL-MCNC: 0.65 MG/DL (ref 0.5–1.4)
ERYTHROCYTE [DISTWIDTH] IN BLOOD BY AUTOMATED COUNT: 42.5 FL (ref 35.9–50)
GLUCOSE SERPL-MCNC: 88 MG/DL (ref 65–99)
HCT VFR BLD AUTO: 45 % (ref 42–52)
HGB BLD-MCNC: 14.2 G/DL (ref 14–18)
MCH RBC QN AUTO: 28.9 PG (ref 27–33)
MCHC RBC AUTO-ENTMCNC: 31.6 G/DL (ref 33.7–35.3)
MCV RBC AUTO: 91.5 FL (ref 81.4–97.8)
PLATELET # BLD AUTO: 225 K/UL (ref 164–446)
PMV BLD AUTO: 10.9 FL (ref 9–12.9)
POTASSIUM SERPL-SCNC: 4.5 MMOL/L (ref 3.6–5.5)
RBC # BLD AUTO: 4.92 M/UL (ref 4.7–6.1)
SODIUM SERPL-SCNC: 140 MMOL/L (ref 135–145)
WBC # BLD AUTO: 6.6 K/UL (ref 4.8–10.8)

## 2020-05-18 PROCEDURE — 36415 COLL VENOUS BLD VENIPUNCTURE: CPT

## 2020-05-18 PROCEDURE — 85027 COMPLETE CBC AUTOMATED: CPT

## 2020-05-18 PROCEDURE — 87641 MR-STAPH DNA AMP PROBE: CPT

## 2020-05-18 PROCEDURE — 87640 STAPH A DNA AMP PROBE: CPT | Mod: XU

## 2020-05-18 PROCEDURE — 80048 BASIC METABOLIC PNL TOTAL CA: CPT

## 2020-05-18 RX ORDER — FUROSEMIDE 40 MG/1
TABLET ORAL PRN
COMMUNITY
End: 2021-09-16

## 2020-05-18 RX ORDER — AZITHROMYCIN 250 MG/1
TABLET, FILM COATED ORAL
Status: ON HOLD | COMMUNITY
End: 2020-05-20

## 2020-05-18 RX ORDER — ACETAMINOPHEN 500 MG
500-1000 TABLET ORAL EVERY 6 HOURS PRN
Status: ON HOLD | COMMUNITY
End: 2020-05-20

## 2020-05-18 RX ORDER — ASPIRIN 325 MG
325 TABLET ORAL EVERY 6 HOURS PRN
Status: ON HOLD | COMMUNITY
End: 2020-05-20

## 2020-05-18 RX ORDER — COVID-19 ANTIGEN TEST
KIT MISCELLANEOUS PRN
Status: ON HOLD | COMMUNITY
End: 2020-05-20

## 2020-05-18 RX ORDER — POTASSIUM CHLORIDE 750 MG/1
CAPSULE, EXTENDED RELEASE ORAL PRN
COMMUNITY
End: 2021-09-16

## 2020-05-18 ASSESSMENT — FIBROSIS 4 INDEX: FIB4 SCORE: 1.32

## 2020-05-18 NOTE — OR NURSING
"Preadmit appointment: \" Preparing for your Procedure information\" sheet given to patient with verbal and written instructions. Patient instructed to continue prescribed medications through the day before surgery, instructed to take the following medications the day of surgery per anesthesia protocol: Flomax.  Pt denies issues with anesthesia.  COVID screening denies symptoms and advised to contact MD if symptoms develop. Pt seen and had workup for shoulder/chest discomfort and workup done including EKG, pt has clearance from Dr. Fenton and pt states Dr. Fenton reviewed EKG. Pt instructed to bring BIPAP, CARLOS education sheet given to pt  "

## 2020-05-18 NOTE — DISCHARGE PLANNING
DISCHARGE PLANNING NOTE - TOTAL JOINT     Procedure: Procedure(s):  ARTHROPLASTY, HIP, TOTAL  Procedure Date: 5/20/2020  Insurance:  Payor: [employer] / Plan: [employer]US / Product Type: *No Product type* /   Equipment currently available at home? crutches, raised toilet seat and shower chair  Steps into the home? 1  Steps within the home? 0  Toilet height? Standard  Type of shower? walk-in shower  Who will be with you during your recovery? Spouse and son.  Is Outpatient Physical Therapy set up after surgery? Yes  Did you take the Total Joint Class and where? Yes, at On[facility].     This writer met with pt during his preadmission appointment. Pt taking joint class on-line. NAON book for hips given to pt. Equipment resource guide and Home Safety checklist reviewed with pt. Understanding noted and copies of both given to pt. Pt does not want a walker. Anticipate dc to home. Pt aware of same day discharge.

## 2020-05-19 LAB
SARS-COV-2 RNA RESP QL NAA+PROBE: NOT DETECTED
SCCMEC + MECA PNL NOSE NAA+PROBE: NEGATIVE
SCCMEC + MECA PNL NOSE NAA+PROBE: NEGATIVE
SPECIMEN SOURCE: NORMAL

## 2020-05-20 ENCOUNTER — APPOINTMENT (OUTPATIENT)
Dept: RADIOLOGY | Facility: MEDICAL CENTER | Age: 70
End: 2020-05-20
Attending: ORTHOPAEDIC SURGERY
Payer: MEDICARE

## 2020-05-20 ENCOUNTER — ANESTHESIA (OUTPATIENT)
Dept: SURGERY | Facility: MEDICAL CENTER | Age: 70
End: 2020-05-20
Payer: MEDICARE

## 2020-05-20 ENCOUNTER — ANESTHESIA EVENT (OUTPATIENT)
Dept: SURGERY | Facility: MEDICAL CENTER | Age: 70
End: 2020-05-20
Payer: MEDICARE

## 2020-05-20 ENCOUNTER — HOSPITAL ENCOUNTER (OUTPATIENT)
Facility: MEDICAL CENTER | Age: 70
End: 2020-05-21
Attending: ORTHOPAEDIC SURGERY | Admitting: ORTHOPAEDIC SURGERY
Payer: MEDICARE

## 2020-05-20 DIAGNOSIS — M16.10 HIP ARTHRITIS: ICD-10-CM

## 2020-05-20 PROBLEM — Z86.79 HISTORY OF ATRIAL FIBRILLATION: Status: ACTIVE | Noted: 2020-05-20

## 2020-05-20 PROCEDURE — A9270 NON-COVERED ITEM OR SERVICE: HCPCS | Performed by: ORTHOPAEDIC SURGERY

## 2020-05-20 PROCEDURE — C1776 JOINT DEVICE (IMPLANTABLE): HCPCS | Performed by: ORTHOPAEDIC SURGERY

## 2020-05-20 PROCEDURE — 160036 HCHG PACU - EA ADDL 30 MINS PHASE I: Performed by: ORTHOPAEDIC SURGERY

## 2020-05-20 PROCEDURE — 96365 THER/PROPH/DIAG IV INF INIT: CPT

## 2020-05-20 PROCEDURE — 700105 HCHG RX REV CODE 258: Performed by: ORTHOPAEDIC SURGERY

## 2020-05-20 PROCEDURE — 97162 PT EVAL MOD COMPLEX 30 MIN: CPT

## 2020-05-20 PROCEDURE — 502240 HCHG MISC OR SUPPLY RC 0272: Performed by: ORTHOPAEDIC SURGERY

## 2020-05-20 PROCEDURE — 160042 HCHG SURGERY MINUTES - EA ADDL 1 MIN LEVEL 5: Performed by: ORTHOPAEDIC SURGERY

## 2020-05-20 PROCEDURE — 700102 HCHG RX REV CODE 250 W/ 637 OVERRIDE(OP): Performed by: ANESTHESIOLOGY

## 2020-05-20 PROCEDURE — 700101 HCHG RX REV CODE 250: Performed by: ORTHOPAEDIC SURGERY

## 2020-05-20 PROCEDURE — 160035 HCHG PACU - 1ST 60 MINS PHASE I: Performed by: ORTHOPAEDIC SURGERY

## 2020-05-20 PROCEDURE — 502578 HCHG PACK, TOTAL HIP: Performed by: ORTHOPAEDIC SURGERY

## 2020-05-20 PROCEDURE — 502000 HCHG MISC OR IMPLANTS RC 0278: Performed by: ORTHOPAEDIC SURGERY

## 2020-05-20 PROCEDURE — 94660 CPAP INITIATION&MGMT: CPT

## 2020-05-20 PROCEDURE — 97110 THERAPEUTIC EXERCISES: CPT

## 2020-05-20 PROCEDURE — A9270 NON-COVERED ITEM OR SERVICE: HCPCS | Performed by: ANESTHESIOLOGY

## 2020-05-20 PROCEDURE — 700111 HCHG RX REV CODE 636 W/ 250 OVERRIDE (IP): Performed by: ANESTHESIOLOGY

## 2020-05-20 PROCEDURE — 72170 X-RAY EXAM OF PELVIS: CPT

## 2020-05-20 PROCEDURE — 700111 HCHG RX REV CODE 636 W/ 250 OVERRIDE (IP): Performed by: ORTHOPAEDIC SURGERY

## 2020-05-20 PROCEDURE — 160031 HCHG SURGERY MINUTES - 1ST 30 MINS LEVEL 5: Performed by: ORTHOPAEDIC SURGERY

## 2020-05-20 PROCEDURE — 94760 N-INVAS EAR/PLS OXIMETRY 1: CPT

## 2020-05-20 PROCEDURE — 700102 HCHG RX REV CODE 250 W/ 637 OVERRIDE(OP): Performed by: ORTHOPAEDIC SURGERY

## 2020-05-20 PROCEDURE — 501445 HCHG STAPLER, SKIN DISP: Performed by: ORTHOPAEDIC SURGERY

## 2020-05-20 PROCEDURE — G0378 HOSPITAL OBSERVATION PER HR: HCPCS

## 2020-05-20 PROCEDURE — 160002 HCHG RECOVERY MINUTES (STAT): Performed by: ORTHOPAEDIC SURGERY

## 2020-05-20 PROCEDURE — 700101 HCHG RX REV CODE 250: Performed by: ANESTHESIOLOGY

## 2020-05-20 PROCEDURE — 160009 HCHG ANES TIME/MIN: Performed by: ORTHOPAEDIC SURGERY

## 2020-05-20 PROCEDURE — 160048 HCHG OR STATISTICAL LEVEL 1-5: Performed by: ORTHOPAEDIC SURGERY

## 2020-05-20 PROCEDURE — 700112 HCHG RX REV CODE 229: Performed by: ORTHOPAEDIC SURGERY

## 2020-05-20 DEVICE — IMPLANTABLE DEVICE: Type: IMPLANTABLE DEVICE | Site: HIP | Status: FUNCTIONAL

## 2020-05-20 RX ORDER — OXYCODONE HYDROCHLORIDE 10 MG/1
10 TABLET ORAL
Status: DISCONTINUED | OUTPATIENT
Start: 2020-05-20 | End: 2020-05-21 | Stop reason: HOSPADM

## 2020-05-20 RX ORDER — CELECOXIB 200 MG/1
400 CAPSULE ORAL ONCE
Status: DISCONTINUED | OUTPATIENT
Start: 2020-05-20 | End: 2020-05-20

## 2020-05-20 RX ORDER — AMOXICILLIN 250 MG
1 CAPSULE ORAL NIGHTLY
Status: DISCONTINUED | OUTPATIENT
Start: 2020-05-20 | End: 2020-05-21 | Stop reason: HOSPADM

## 2020-05-20 RX ORDER — OXYCODONE HCL 5 MG/5 ML
5 SOLUTION, ORAL ORAL
Status: COMPLETED | OUTPATIENT
Start: 2020-05-20 | End: 2020-05-20

## 2020-05-20 RX ORDER — TRAMADOL HYDROCHLORIDE 50 MG/1
50 TABLET ORAL EVERY 6 HOURS
Status: DISCONTINUED | OUTPATIENT
Start: 2020-05-20 | End: 2020-05-21 | Stop reason: HOSPADM

## 2020-05-20 RX ORDER — ACETAMINOPHEN 500 MG
1000 TABLET ORAL ONCE
Status: COMPLETED | OUTPATIENT
Start: 2020-05-20 | End: 2020-05-20

## 2020-05-20 RX ORDER — SCOLOPAMINE TRANSDERMAL SYSTEM 1 MG/1
1 PATCH, EXTENDED RELEASE TRANSDERMAL
Status: DISCONTINUED | OUTPATIENT
Start: 2020-05-20 | End: 2020-05-21 | Stop reason: HOSPADM

## 2020-05-20 RX ORDER — HYDROMORPHONE HYDROCHLORIDE 1 MG/ML
0.2 INJECTION, SOLUTION INTRAMUSCULAR; INTRAVENOUS; SUBCUTANEOUS
Status: DISCONTINUED | OUTPATIENT
Start: 2020-05-20 | End: 2020-05-20 | Stop reason: HOSPADM

## 2020-05-20 RX ORDER — AMOXICILLIN 250 MG
1 CAPSULE ORAL
Status: DISCONTINUED | OUTPATIENT
Start: 2020-05-20 | End: 2020-05-21 | Stop reason: HOSPADM

## 2020-05-20 RX ORDER — TAMSULOSIN HYDROCHLORIDE 0.4 MG/1
0.4 CAPSULE ORAL
Status: DISCONTINUED | OUTPATIENT
Start: 2020-05-20 | End: 2020-05-21 | Stop reason: HOSPADM

## 2020-05-20 RX ORDER — LABETALOL HYDROCHLORIDE 5 MG/ML
5 INJECTION, SOLUTION INTRAVENOUS
Status: DISCONTINUED | OUTPATIENT
Start: 2020-05-20 | End: 2020-05-20 | Stop reason: HOSPADM

## 2020-05-20 RX ORDER — SODIUM CHLORIDE, SODIUM LACTATE, POTASSIUM CHLORIDE, CALCIUM CHLORIDE 600; 310; 30; 20 MG/100ML; MG/100ML; MG/100ML; MG/100ML
INJECTION, SOLUTION INTRAVENOUS CONTINUOUS
Status: DISCONTINUED | OUTPATIENT
Start: 2020-05-20 | End: 2020-05-20 | Stop reason: HOSPADM

## 2020-05-20 RX ORDER — DEXTROSE MONOHYDRATE, SODIUM CHLORIDE, AND POTASSIUM CHLORIDE 50; 1.49; 4.5 G/1000ML; G/1000ML; G/1000ML
INJECTION, SOLUTION INTRAVENOUS CONTINUOUS
Status: ACTIVE | OUTPATIENT
Start: 2020-05-20 | End: 2020-05-20

## 2020-05-20 RX ORDER — DEXAMETHASONE SODIUM PHOSPHATE 4 MG/ML
4 INJECTION, SOLUTION INTRA-ARTICULAR; INTRALESIONAL; INTRAMUSCULAR; INTRAVENOUS; SOFT TISSUE
Status: DISCONTINUED | OUTPATIENT
Start: 2020-05-20 | End: 2020-05-21 | Stop reason: HOSPADM

## 2020-05-20 RX ORDER — TRANEXAMIC ACID 100 MG/ML
INJECTION, SOLUTION INTRAVENOUS PRN
Status: DISCONTINUED | OUTPATIENT
Start: 2020-05-20 | End: 2020-05-20 | Stop reason: SURG

## 2020-05-20 RX ORDER — ASPIRIN 325 MG
325 TABLET, DELAYED RELEASE (ENTERIC COATED) ORAL 2 TIMES DAILY
Qty: 60 TAB | Refills: 0
Start: 2020-05-21 | End: 2021-09-16

## 2020-05-20 RX ORDER — SODIUM CHLORIDE, SODIUM LACTATE, POTASSIUM CHLORIDE, CALCIUM CHLORIDE 600; 310; 30; 20 MG/100ML; MG/100ML; MG/100ML; MG/100ML
INJECTION, SOLUTION INTRAVENOUS CONTINUOUS
Status: ACTIVE | OUTPATIENT
Start: 2020-05-20 | End: 2020-05-20

## 2020-05-20 RX ORDER — METOPROLOL TARTRATE 1 MG/ML
1 INJECTION, SOLUTION INTRAVENOUS
Status: DISCONTINUED | OUTPATIENT
Start: 2020-05-20 | End: 2020-05-20 | Stop reason: HOSPADM

## 2020-05-20 RX ORDER — DIPHENHYDRAMINE HYDROCHLORIDE 50 MG/ML
25 INJECTION INTRAMUSCULAR; INTRAVENOUS EVERY 6 HOURS PRN
Status: DISCONTINUED | OUTPATIENT
Start: 2020-05-20 | End: 2020-05-21 | Stop reason: HOSPADM

## 2020-05-20 RX ORDER — OXYCODONE HYDROCHLORIDE 5 MG/1
5 TABLET ORAL
Qty: 30 TAB | Refills: 0
Start: 2020-05-20 | End: 2020-05-25

## 2020-05-20 RX ORDER — CELECOXIB 200 MG/1
200 CAPSULE ORAL 2 TIMES DAILY
Status: DISCONTINUED | OUTPATIENT
Start: 2020-05-20 | End: 2020-05-21 | Stop reason: HOSPADM

## 2020-05-20 RX ORDER — ACETAMINOPHEN 500 MG
1000 TABLET ORAL EVERY 6 HOURS
Qty: 30 TAB | Refills: 0 | Status: SHIPPED | OUTPATIENT
Start: 2020-05-20 | End: 2021-09-16

## 2020-05-20 RX ORDER — HYDROMORPHONE HYDROCHLORIDE 1 MG/ML
0.4 INJECTION, SOLUTION INTRAMUSCULAR; INTRAVENOUS; SUBCUTANEOUS
Status: DISCONTINUED | OUTPATIENT
Start: 2020-05-20 | End: 2020-05-20 | Stop reason: HOSPADM

## 2020-05-20 RX ORDER — MIDAZOLAM HYDROCHLORIDE 1 MG/ML
INJECTION INTRAMUSCULAR; INTRAVENOUS PRN
Status: DISCONTINUED | OUTPATIENT
Start: 2020-05-20 | End: 2020-05-20 | Stop reason: SURG

## 2020-05-20 RX ORDER — POLYETHYLENE GLYCOL 3350 17 G/17G
1 POWDER, FOR SOLUTION ORAL 2 TIMES DAILY PRN
Status: DISCONTINUED | OUTPATIENT
Start: 2020-05-20 | End: 2020-05-21 | Stop reason: HOSPADM

## 2020-05-20 RX ORDER — ACETAMINOPHEN 500 MG
1000 TABLET ORAL EVERY 6 HOURS
Status: DISCONTINUED | OUTPATIENT
Start: 2020-05-20 | End: 2020-05-21 | Stop reason: HOSPADM

## 2020-05-20 RX ORDER — ROCURONIUM BROMIDE 10 MG/ML
INJECTION, SOLUTION INTRAVENOUS PRN
Status: DISCONTINUED | OUTPATIENT
Start: 2020-05-20 | End: 2020-05-20 | Stop reason: SURG

## 2020-05-20 RX ORDER — ONDANSETRON 2 MG/ML
4 INJECTION INTRAMUSCULAR; INTRAVENOUS
Status: DISCONTINUED | OUTPATIENT
Start: 2020-05-20 | End: 2020-05-20 | Stop reason: HOSPADM

## 2020-05-20 RX ORDER — GABAPENTIN 300 MG/1
300 CAPSULE ORAL ONCE
Status: COMPLETED | OUTPATIENT
Start: 2020-05-20 | End: 2020-05-20

## 2020-05-20 RX ORDER — HYDRALAZINE HYDROCHLORIDE 20 MG/ML
5 INJECTION INTRAMUSCULAR; INTRAVENOUS
Status: DISCONTINUED | OUTPATIENT
Start: 2020-05-20 | End: 2020-05-20 | Stop reason: HOSPADM

## 2020-05-20 RX ORDER — OXYCODONE HCL 5 MG/5 ML
10 SOLUTION, ORAL ORAL
Status: COMPLETED | OUTPATIENT
Start: 2020-05-20 | End: 2020-05-20

## 2020-05-20 RX ORDER — HALOPERIDOL 5 MG/ML
1 INJECTION INTRAMUSCULAR
Status: DISCONTINUED | OUTPATIENT
Start: 2020-05-20 | End: 2020-05-20 | Stop reason: HOSPADM

## 2020-05-20 RX ORDER — LIDOCAINE HYDROCHLORIDE 20 MG/ML
INJECTION, SOLUTION EPIDURAL; INFILTRATION; INTRACAUDAL; PERINEURAL PRN
Status: DISCONTINUED | OUTPATIENT
Start: 2020-05-20 | End: 2020-05-20 | Stop reason: SURG

## 2020-05-20 RX ORDER — HYDROMORPHONE HYDROCHLORIDE 1 MG/ML
0.5 INJECTION, SOLUTION INTRAMUSCULAR; INTRAVENOUS; SUBCUTANEOUS
Status: DISCONTINUED | OUTPATIENT
Start: 2020-05-20 | End: 2020-05-21 | Stop reason: HOSPADM

## 2020-05-20 RX ORDER — ENEMA 19; 7 G/133ML; G/133ML
1 ENEMA RECTAL
Status: DISCONTINUED | OUTPATIENT
Start: 2020-05-20 | End: 2020-05-21 | Stop reason: HOSPADM

## 2020-05-20 RX ORDER — ONDANSETRON 2 MG/ML
4 INJECTION INTRAMUSCULAR; INTRAVENOUS EVERY 4 HOURS PRN
Status: DISCONTINUED | OUTPATIENT
Start: 2020-05-20 | End: 2020-05-21 | Stop reason: HOSPADM

## 2020-05-20 RX ORDER — HALOPERIDOL 5 MG/ML
1 INJECTION INTRAMUSCULAR EVERY 6 HOURS PRN
Status: DISCONTINUED | OUTPATIENT
Start: 2020-05-20 | End: 2020-05-21 | Stop reason: HOSPADM

## 2020-05-20 RX ORDER — DEXAMETHASONE SODIUM PHOSPHATE 4 MG/ML
INJECTION, SOLUTION INTRA-ARTICULAR; INTRALESIONAL; INTRAMUSCULAR; INTRAVENOUS; SOFT TISSUE PRN
Status: DISCONTINUED | OUTPATIENT
Start: 2020-05-20 | End: 2020-05-20 | Stop reason: SURG

## 2020-05-20 RX ORDER — DIPHENHYDRAMINE HYDROCHLORIDE 50 MG/ML
12.5 INJECTION INTRAMUSCULAR; INTRAVENOUS
Status: DISCONTINUED | OUTPATIENT
Start: 2020-05-20 | End: 2020-05-20 | Stop reason: HOSPADM

## 2020-05-20 RX ORDER — SUCCINYLCHOLINE/SOD CL,ISO/PF 200MG/10ML
SYRINGE (ML) INTRAVENOUS PRN
Status: DISCONTINUED | OUTPATIENT
Start: 2020-05-20 | End: 2020-05-20 | Stop reason: SURG

## 2020-05-20 RX ORDER — CEFAZOLIN SODIUM 1 G/3ML
INJECTION, POWDER, FOR SOLUTION INTRAMUSCULAR; INTRAVENOUS PRN
Status: DISCONTINUED | OUTPATIENT
Start: 2020-05-20 | End: 2020-05-20 | Stop reason: SURG

## 2020-05-20 RX ORDER — HYDROMORPHONE HYDROCHLORIDE 1 MG/ML
0.1 INJECTION, SOLUTION INTRAMUSCULAR; INTRAVENOUS; SUBCUTANEOUS
Status: DISCONTINUED | OUTPATIENT
Start: 2020-05-20 | End: 2020-05-20 | Stop reason: HOSPADM

## 2020-05-20 RX ORDER — BISACODYL 10 MG
10 SUPPOSITORY, RECTAL RECTAL
Status: DISCONTINUED | OUTPATIENT
Start: 2020-05-20 | End: 2020-05-21 | Stop reason: HOSPADM

## 2020-05-20 RX ORDER — DOCUSATE SODIUM 100 MG/1
100 CAPSULE, LIQUID FILLED ORAL 2 TIMES DAILY
Status: DISCONTINUED | OUTPATIENT
Start: 2020-05-20 | End: 2020-05-21 | Stop reason: HOSPADM

## 2020-05-20 RX ORDER — TRAMADOL HYDROCHLORIDE 50 MG/1
50 TABLET ORAL EVERY 6 HOURS
Qty: 30 TAB | Refills: 0
Start: 2020-05-20 | End: 2020-05-30

## 2020-05-20 RX ORDER — OXYCODONE HYDROCHLORIDE 5 MG/1
5 TABLET ORAL
Status: DISCONTINUED | OUTPATIENT
Start: 2020-05-20 | End: 2020-05-21 | Stop reason: HOSPADM

## 2020-05-20 RX ADMIN — SENNOSIDES AND DOCUSATE SODIUM 1 TABLET: 8.6; 5 TABLET ORAL at 20:36

## 2020-05-20 RX ADMIN — DEXAMETHASONE SODIUM PHOSPHATE 8 MG: 4 INJECTION, SOLUTION INTRAMUSCULAR; INTRAVENOUS at 10:07

## 2020-05-20 RX ADMIN — FENTANYL CITRATE 50 MCG: 50 INJECTION, SOLUTION INTRAMUSCULAR; INTRAVENOUS at 11:33

## 2020-05-20 RX ADMIN — OXYCODONE HYDROCHLORIDE 10 MG: 5 SOLUTION ORAL at 12:04

## 2020-05-20 RX ADMIN — HYDROMORPHONE HYDROCHLORIDE 0.4 MG: 1 INJECTION, SOLUTION INTRAMUSCULAR; INTRAVENOUS; SUBCUTANEOUS at 12:24

## 2020-05-20 RX ADMIN — LIDOCAINE HYDROCHLORIDE 100 MG: 20 INJECTION, SOLUTION EPIDURAL; INFILTRATION; INTRACAUDAL; PERINEURAL at 10:06

## 2020-05-20 RX ADMIN — SODIUM CHLORIDE, POTASSIUM CHLORIDE, SODIUM LACTATE AND CALCIUM CHLORIDE 1000 ML: 600; 310; 30; 20 INJECTION, SOLUTION INTRAVENOUS at 07:26

## 2020-05-20 RX ADMIN — TRAMADOL HYDROCHLORIDE 50 MG: 50 TABLET, FILM COATED ORAL at 16:38

## 2020-05-20 RX ADMIN — PROPOFOL 180 MG: 10 INJECTION, EMULSION INTRAVENOUS at 10:07

## 2020-05-20 RX ADMIN — POTASSIUM CHLORIDE, DEXTROSE MONOHYDRATE AND SODIUM CHLORIDE: 150; 5; 450 INJECTION, SOLUTION INTRAVENOUS at 16:43

## 2020-05-20 RX ADMIN — FENTANYL CITRATE 100 MCG: 50 INJECTION, SOLUTION INTRAMUSCULAR; INTRAVENOUS at 10:27

## 2020-05-20 RX ADMIN — FENTANYL CITRATE 50 MCG: 50 INJECTION INTRAMUSCULAR; INTRAVENOUS at 12:04

## 2020-05-20 RX ADMIN — DOCUSATE SODIUM 100 MG: 100 CAPSULE, LIQUID FILLED ORAL at 20:36

## 2020-05-20 RX ADMIN — SODIUM CHLORIDE 3 G: 9 INJECTION, SOLUTION INTRAVENOUS at 16:39

## 2020-05-20 RX ADMIN — CEFAZOLIN 3 G: 1 INJECTION, POWDER, FOR SOLUTION INTRAVENOUS at 10:19

## 2020-05-20 RX ADMIN — ACETAMINOPHEN 1000 MG: 500 TABLET, FILM COATED ORAL at 23:10

## 2020-05-20 RX ADMIN — PROPOFOL 20 MG: 10 INJECTION, EMULSION INTRAVENOUS at 11:33

## 2020-05-20 RX ADMIN — MIDAZOLAM HYDROCHLORIDE 2 MG: 1 INJECTION, SOLUTION INTRAMUSCULAR; INTRAVENOUS at 10:03

## 2020-05-20 RX ADMIN — TRANEXAMIC ACID 1500 MG: 100 INJECTION, SOLUTION INTRAVENOUS at 10:20

## 2020-05-20 RX ADMIN — LIDOCAINE HYDROCHLORIDE 0.5 ML: 10 INJECTION, SOLUTION INFILTRATION; PERINEURAL at 07:23

## 2020-05-20 RX ADMIN — TRANEXAMIC ACID 1500 MG: 100 INJECTION, SOLUTION INTRAVENOUS at 11:29

## 2020-05-20 RX ADMIN — TAMSULOSIN HYDROCHLORIDE 0.4 MG: 0.4 CAPSULE ORAL at 16:38

## 2020-05-20 RX ADMIN — ACETAMINOPHEN 1000 MG: 500 TABLET, FILM COATED ORAL at 08:41

## 2020-05-20 RX ADMIN — FENTANYL CITRATE 100 MCG: 50 INJECTION, SOLUTION INTRAMUSCULAR; INTRAVENOUS at 10:03

## 2020-05-20 RX ADMIN — GABAPENTIN 300 MG: 300 CAPSULE ORAL at 08:42

## 2020-05-20 RX ADMIN — ROCURONIUM BROMIDE 30 MG: 10 INJECTION, SOLUTION INTRAVENOUS at 10:07

## 2020-05-20 RX ADMIN — HYDROMORPHONE HYDROCHLORIDE 0.4 MG: 1 INJECTION, SOLUTION INTRAMUSCULAR; INTRAVENOUS; SUBCUTANEOUS at 12:32

## 2020-05-20 RX ADMIN — Medication 180 MG: at 10:07

## 2020-05-20 RX ADMIN — POVIDONE-IODINE 15 ML: 10 SOLUTION TOPICAL at 07:23

## 2020-05-20 RX ADMIN — HYDROMORPHONE HYDROCHLORIDE 0.2 MG: 1 INJECTION, SOLUTION INTRAMUSCULAR; INTRAVENOUS; SUBCUTANEOUS at 12:47

## 2020-05-20 RX ADMIN — FENTANYL CITRATE 50 MCG: 50 INJECTION INTRAMUSCULAR; INTRAVENOUS at 12:15

## 2020-05-20 RX ADMIN — ACETAMINOPHEN 1000 MG: 500 TABLET, FILM COATED ORAL at 16:37

## 2020-05-20 RX ADMIN — TRAMADOL HYDROCHLORIDE 50 MG: 50 TABLET, FILM COATED ORAL at 23:10

## 2020-05-20 RX ADMIN — SODIUM CHLORIDE, POTASSIUM CHLORIDE, SODIUM LACTATE AND CALCIUM CHLORIDE: 600; 310; 30; 20 INJECTION, SOLUTION INTRAVENOUS at 09:56

## 2020-05-20 ASSESSMENT — COGNITIVE AND FUNCTIONAL STATUS - GENERAL
SUGGESTED CMS G CODE MODIFIER MOBILITY: CH
SUGGESTED CMS G CODE MODIFIER DAILY ACTIVITY: CJ
MOBILITY SCORE: 24
DRESSING REGULAR LOWER BODY CLOTHING: A LITTLE
MOVING FROM LYING ON BACK TO SITTING ON SIDE OF FLAT BED: A LITTLE
SUGGESTED CMS G CODE MODIFIER MOBILITY: CK
TURNING FROM BACK TO SIDE WHILE IN FLAT BAD: A LITTLE
CLIMB 3 TO 5 STEPS WITH RAILING: A LOT
DAILY ACTIVITIY SCORE: 22
STANDING UP FROM CHAIR USING ARMS: A LITTLE
MOVING TO AND FROM BED TO CHAIR: A LITTLE
MOBILITY SCORE: 17
HELP NEEDED FOR BATHING: A LITTLE
WALKING IN HOSPITAL ROOM: A LITTLE

## 2020-05-20 ASSESSMENT — GAIT ASSESSMENTS
GAIT LEVEL OF ASSIST: MINIMAL ASSIST
DISTANCE (FEET): 50
ASSISTIVE DEVICE: FRONT WHEEL WALKER
DEVIATION: ANTALGIC;STEP TO

## 2020-05-20 ASSESSMENT — PATIENT HEALTH QUESTIONNAIRE - PHQ9
2. FEELING DOWN, DEPRESSED, IRRITABLE, OR HOPELESS: NOT AT ALL
1. LITTLE INTEREST OR PLEASURE IN DOING THINGS: NOT AT ALL
SUM OF ALL RESPONSES TO PHQ9 QUESTIONS 1 AND 2: 0
SUM OF ALL RESPONSES TO PHQ9 QUESTIONS 1 AND 2: 0
1. LITTLE INTEREST OR PLEASURE IN DOING THINGS: NOT AT ALL
2. FEELING DOWN, DEPRESSED, IRRITABLE, OR HOPELESS: NOT AT ALL

## 2020-05-20 ASSESSMENT — FIBROSIS 4 INDEX: FIB4 SCORE: 1.3

## 2020-05-20 ASSESSMENT — PAIN SCALES - GENERAL: PAIN_LEVEL: 6

## 2020-05-20 NOTE — ANESTHESIA QCDR
2019 [facility] Clinical Data Registry (for Quality Improvement)     Postoperative nausea/vomiting risk protocol (Adult = 18 yrs and Pediatric 3-17 yrs)- (430 and 463)  General inhalation anesthetic (NOT TIVA) with PONV risk factors: No  Provision of anti-emetic therapy with at least 2 different classes of agents: N/A  Patient DID NOT receive anti-emetic therapy and reason is documented in Medical Record: N/A    Multimodal Pain Management- (477)  Non-emergent surgery AND patient age >= 18: Yes  Use of Multimodal Pain Management, two or more drugs and/or interventions, NOT including systemic opioids: Yes  Exception: Documented allergy to multiple classes of analgesics: N/A    Smoking Abstinence (404)  Patient is current smoker (cigarette, pipe, e-cig, marijuanna):   Elective Surgery:   Abstinence instructions provided prior to day of surgery:   Patient abstained from smoking on day of surgery:     Pre-Op Beta-Blocker in Isolated CABG (44)  Isolated CABG AND patient age >= 18: No  Beta-blocker admin within 24 hours of surgical incision:   Exception:of medical reason(s) for not administering beta blocker within 24 hours prior to surgical incision (e.g., not  indicated,other medical reason):     PACU assessment of acute postoperative pain prior to Anesthesia Care End- Applies to Patients Age = 18- (ABG7)  Initial PACU pain score is which of the following: < 7/10  Patient unable to report pain score: N/A    Post-anesthetic transfer of care checklist/protocol to PACU/ICU- (426 and 427)  Upon conclusion of case, patient transferred to which of the following locations: PACU/Non-ICU  Use of transfer checklist/protocol: Yes  Exclusion: Service Performed in Patient Hospital Room (and thus did not require transfer): N/A  Unplanned admission to ICU related to anesthesia service up through end of PACU care- (MD51)  Unplanned admission to ICU (not initially anticipated at anesthesia start time): No

## 2020-05-20 NOTE — OR NURSING
1142 received from or  resp spont  r hip dressing c/d/i  Dp2+  Foot warm    Ice pack place  Abductor pillow in place  Medicated for 8/10 pain prn  1215 continuing to medicate  1315 meets discharge criteria

## 2020-05-20 NOTE — THERAPY
Missed Therapy     Patient Name: Jhony Rodriguez  Age:  69 y.o., Sex:  male  Medical Record #: [id number]  Today's Date: 5/20/2020    Discussed missed therapy with RN, PT       05/20/20 5559   Interdisciplinary Plan of Care Collaboration   Collaboration Comments Plan to attempt OT eval per nursing request.  However, pt did not tolerate PT eval due to anesthesia and excessive meds.  Pt prefers to stay overnight.  Will hold OT eval at this time.

## 2020-05-20 NOTE — OP REPORT
DATE OF SERVICE:  05/20/2020    PREOPERATIVE DIAGNOSIS:  Right hip degenerative joint disease.      POSTOPERATIVE DIAGNOSIS:  Right hip degenerative joint disease.      PROCEDURE:  Right total hip arthroplasty.      SURGEON:  Connor Fonseca MD     ASSISTANT:  Rudy Christianson CFA     ANESTHESIA:  General endotracheal anesthesia.      ANESTHESIOLOGIST:  Osito Montero MD    ESTIMATED BLOOD LOSS:  Minimal.      COMPLICATIONS:  None.      INDICATIONS FOR PROCEDURE:  This is a 69-year-old male with severe   degenerative joint disease of his right hip.  He has failed conservative   management and presents for operative treatment.  For further details of H and   P, please see chart.      Risks, benefits, alternative procedure were discussed with the patient include   but not limited to bleeding, infection, neurovascular injury, PE, DVT,   dislocation, anesthesia complications, and death.  All questions were   answered.  No warranties or guarantees were given or implied.  Consent is   signed and is on chart.      DESCRIPTION OF PROCEDURE:  Patient was taken to the operating room, placed   supine on the operating room table.  General endotracheal anesthesia was   induced.  Patient was placed in left lateral decubitus position.  His right   buttock and hip were prepped and draped in normal sterile fashion.  A 30 cm   incision was made centered over the patient's greater trochanter, was taken   down sharply through skin and subcutaneous tissue.  Bovie cautery was used to   obtain hemostasis.  Dissection carried to the level of IT band.  The IT band   was split in line with the incision.  Retractors were placed.  The posterior   capsule and short external rotators were removed off the posterior aspect of   the hip.  Hip was dislocated.  Femoral neck was cleaned off.  The ConforMIS F1   guide was placed on the  femoral neck.  Femoral neck cut was made and   removed.  This was checked with the reverse guide and felt to be  a good cut.    Retractors were placed around the acetabulum.  Ligamentum and labrum were   excised.  The 57 mm reamer was placed on hand to remove any excess cartilage.    The A1 jig was then placed.  This seated quite nicely, was pinned into   position and the 2 lug holes were drilled.  This was then removed.  A2 guide   was impacted.  There was good bone, it was not felt screw was needed.  Open   face reamer was placed over this.  This was reamed with excellent depth.  This   was then removed and the closed face reamer was placed, which removed any   excess bone.  A3 guide was placed and rotation was marked.  Cup was impacted   into position in appropriate version and inclination and matched the previous   guide and also matched the model as on the back table.  Single screw was   placed through the cup with excellent fixation.  A standard +2 liner was   impacted into position.  Attention was then placed on the femur.  The F2 guide   was placed.  Cookie cutter was placed through this.  This was then removed.    Canal finder was placed followed by the lateralizing reamer.  9 mm broach was   then placed checking alignment with the jig.  This was then broached 9, 10,   11, 12, 13, 14, 15, and size 16 broach.  Size 16 broach had excellent fit.    The ConforMIS patient specific size 16 stem was then impacted into position.    This seated quite well and was stable.  This was trialed with a large head,   which did dislocate in flexion and abduction approximately 45 degrees of   internal rotation.  It was then retrialed with an extra long head, which had   improved stability.  Trial head was removed.  A 36 mm extra long head was   impacted into position.  This was again reduced showed slight increase in leg   length with excellent stability.  At this point, the hip was thoroughly   lavaged with Betadine.  It was then relavaged with saline.  Posterior capsule   was closed using #1 Vicryl, IT band closed using #2 Quill,  subcutaneous tissue   closed using 2-0 Vicryl and skin closed using staples.  Patient was placed in   soft sterile dressing and abduction pillow.  He was awakened from anesthesia   and returned to recovery cart in the recovery room in stable condition.  There   were no lee or intraoperative complications noted.       ____________________________________     MD REJI Bates / JUSTINA    DD:  05/20/2020 11:43:47  DT:  05/20/2020 14:45:14    D#:  [id number]  Job#:  [id number]

## 2020-05-20 NOTE — OR SURGEON
Immediate Post OP Note    PreOp Diagnosis: rt hip DJD    PostOp Diagnosis: same    Procedure(s):  ARTHROPLASTY, HIP, TOTAL - Wound Class: Clean    Surgeon(s):  Connor Fonseca M.D.    Anesthesiologist/Type of Anesthesia:  Anesthesiologist: Osito Montero M.D./General    Surgical Staff:  Assistant: JS BetancourtNCONCEPCION  Circulator: Jeri Bucio R.N.  Limb Godwin: Beny Mitchell  Scrub Person: Franklyn Tang    Specimens removed if any:  * No specimens in log *    Estimated Blood Loss: 250cc    Findings: severe DJD rt hip    Complications: none        5/20/2020 11:39 AM Connor Fonseca M.D.

## 2020-05-20 NOTE — ANESTHESIA POSTPROCEDURE EVALUATION
Patient: Jhony Rodriguez    Procedure Summary     Date:  05/20/20 Room / Location:   OR 03 / SURGERY [facility]    Anesthesia Start:  1000 Anesthesia Stop:      Procedure:  ARTHROPLASTY, HIP, TOTAL (Right Hip) Diagnosis:  (ARTHRITIS OF HIP)    Surgeon:  Connor Fonseca M.D. Responsible Provider:  Osito Montero M.D.    Anesthesia Type:  general ASA Status:  3          Final Anesthesia Type: general  Last vitals  BP   Blood Pressure : 131/71    Temp   36.8 °C (98.2 °F)    Pulse   Pulse: (!) 49   Resp   16    SpO2   97 %      Anesthesia Post Evaluation    Patient location during evaluation: PACU  Patient participation: complete - patient participated  Level of consciousness: awake and alert  Pain score: 6    Airway patency: patent  Anesthetic complications: no  Cardiovascular status: hemodynamically stable  Respiratory status: acceptable  Hydration status: euvolemic    PONV: none           Nurse Pain Score: 10 (NPRS)

## 2020-05-20 NOTE — THERAPY
Physical Therapy   Initial Evaluation     Patient Name: Jhony Rodriguez  Age:  69 y.o., Sex:  male  Medical Record #: [id number]  Today's Date: 5/20/2020     Precautions  Precautions: Posterior Hip Precautions, Weight Bearing As Tolerated Right Lower Extremity    Subjective    Initially pt considering going home however as pt began to mobilize stated preferred to stay the night.     Objective       05/20/20 1608   Prior Living Situation   Housing / Facility 1 Story House   Steps Into Home 1   Steps In Home 1   Rail None   Equipment Owned Crutches;Raised Toilet Seat Without Arms;Single Point Cane   Lives with - Patient's Self Care Capacity Spouse   Comments supportive spouse   Prior Level of Functional Mobility   Bed Mobility Independent   Transfer Status Independent   Ambulation Independent   Assistive Devices Used None   Stairs Independent   Gait Analysis   Gait Level Of Assist Minimal Assist   Assistive Device Front Wheel Walker   Distance (Feet) 50   # of Times Distance was Traveled 1   Deviation Antalgic;Step To   Weight Bearing Status WBAT R LE   Bed Mobility    Supine to Sit Minimal Assist   Functional Mobility   Sit to Stand Minimal Assist   Bed, Chair, Wheelchair Transfer Minimal Assist   Transfer Method Stand Step  (with FWW)   Anticipated Discharge Equipment   DC Equipment Front-Wheel Walker       Assessment  Patient is 69 y.o. male s/p R KIA POD#0. Pt is presenting with R hip pain as well as impaired strength limiting ability to mobilize safely. Recommend continued acute PT to progress mobility and review KIA precautions prior to DC home.       Plan    Recommend Physical Therapy daily until therapy goals are met for the following treatments:  Bed Mobility, Gait Training, Stair Training, Therapeutic Activities and Therapeutic Exercises    Discharge recommendations:  Recommend outpatient physical therapy services to address higher level deficits.

## 2020-05-20 NOTE — FLOWSHEET NOTE
05/20/20 1610   Events/Summary/Plan   Events/Summary/Plan IS/O2    Vital Signs   Pulse 60   Respiration 18   Pulse Oximetry 95 %   $ Pulse Oximetry (Spot Check) Yes   Respiratory Assessment   Level of Consciousness Alert   Oxygen   O2 (LPM) 0   O2 Delivery Device Room air w/o2 available

## 2020-05-20 NOTE — ANESTHESIA PREPROCEDURE EVALUATION
Relevant Problems   ANESTHESIA   (+) CARLOS treated with BiPAP      NEURO   (+) History of atrial fibrillation   (+) History of obstructive sleep apnea- resolved with weight loss      Other   (+) Benign nodular prostatic hyperplasia with lower urinary tract symptoms- controlled with prn tamsulosin 2-3x/wk   (+) Bilateral leg edema   (+) Chronic respiratory failure with hypoxia (HCC)- nocturnal o2   (+) Class 3 severe obesity in adult (HCC)   (+) Dyslipidemia   (+) Gastric bypass status for obesity-2005 dr gallo   (+) Hypogonadism male   (+) Iron deficiency- due to gastric bypass   (+) Primary osteoarthritis of right hip- THR tbd 5/2020-n dr barraza   (+) S/P RF ablation operation for arrhythmia-2008; no recurrence; no meds   (+) S/P total knee arthroplasty, bilateral       Physical Exam    Airway   Mallampati: II  TM distance: >3 FB  Neck ROM: full       Cardiovascular - normal exam  Rhythm: regular  Rate: normal  (-) murmur  Comments: By palpation of radial artery   Dental - normal exam           Pulmonary    Abdominal    Neurological     Comments: A&Ox4, grossly intact.             Anesthesia Plan    ASA 3   ASA physical status 3 criteria: morbid obesity - BMI greater than or equal to 40    Plan - general       Airway plan will be ETT        Induction: intravenous    Postoperative Plan: Postoperative administration of opioids is intended.    Pertinent diagnostic labs and testing reviewed    Informed Consent:    Anesthetic plan and risks discussed with patient.    Use of blood products discussed with: patient whom consented to blood products.

## 2020-05-20 NOTE — OR NURSING
1220: Report rec'd. Pt states pain is 10/10, see MAR.  1243: Pt sleeping, awakens easily.  1254: Report given to CONNIE Tan RN.

## 2020-05-20 NOTE — ANESTHESIA PROCEDURE NOTES
Airway    Date/Time: 5/20/2020 10:07 AM  Performed by: Osito Montero M.D.  Authorized by: Osito Montero M.D.     Location:  OR  Urgency:  Elective  Difficult Airway: No    Indications for Airway Management:  Anesthesia      Spontaneous Ventilation: absent    Sedation Level:  Deep  Preoxygenated: Yes    Patient Position:  Sniffing  Mask Difficulty Assessment:  0 - not attempted  Final Airway Type:  Endotracheal airway  Final Endotracheal Airway:  ETT  Cuffed: Yes    Technique Used for Successful ETT Placement:  Direct laryngoscopy  Devices/Methods Used in Placement:  Intubating stylet    Insertion Site:  Oral  Blade Type:  Shanel  Laryngoscope Blade/Videolaryngoscope Blade Size:  4  ETT Size (mm):  8.5  Measured from:  Lips  ETT to Lips (cm):  25  Placement Verified by: capnometry    Cormack-Lehane Classification:  Grade IIb - view of arytenoids or posterior of glottis only (Grade 1 view with cricoid pressure)  Number of Attempts at Approach:  1  Ventilation Between Attempts:  None  Number of Other Approaches Attempted:  0

## 2020-05-21 VITALS
RESPIRATION RATE: 18 BRPM | OXYGEN SATURATION: 99 % | SYSTOLIC BLOOD PRESSURE: 124 MMHG | HEART RATE: 62 BPM | TEMPERATURE: 97.6 F | BODY MASS INDEX: 38.36 KG/M2 | HEIGHT: 76 IN | DIASTOLIC BLOOD PRESSURE: 67 MMHG | WEIGHT: 315 LBS

## 2020-05-21 PROCEDURE — 700111 HCHG RX REV CODE 636 W/ 250 OVERRIDE (IP): Performed by: ORTHOPAEDIC SURGERY

## 2020-05-21 PROCEDURE — 97116 GAIT TRAINING THERAPY: CPT

## 2020-05-21 PROCEDURE — 97166 OT EVAL MOD COMPLEX 45 MIN: CPT

## 2020-05-21 PROCEDURE — G0378 HOSPITAL OBSERVATION PER HR: HCPCS

## 2020-05-21 PROCEDURE — 97535 SELF CARE MNGMENT TRAINING: CPT

## 2020-05-21 PROCEDURE — 96366 THER/PROPH/DIAG IV INF ADDON: CPT

## 2020-05-21 PROCEDURE — 97530 THERAPEUTIC ACTIVITIES: CPT

## 2020-05-21 PROCEDURE — 700105 HCHG RX REV CODE 258: Performed by: ORTHOPAEDIC SURGERY

## 2020-05-21 PROCEDURE — 700112 HCHG RX REV CODE 229: Performed by: ORTHOPAEDIC SURGERY

## 2020-05-21 PROCEDURE — 700102 HCHG RX REV CODE 250 W/ 637 OVERRIDE(OP): Performed by: ORTHOPAEDIC SURGERY

## 2020-05-21 PROCEDURE — A9270 NON-COVERED ITEM OR SERVICE: HCPCS | Performed by: ORTHOPAEDIC SURGERY

## 2020-05-21 RX ADMIN — SODIUM CHLORIDE 3 G: 9 INJECTION, SOLUTION INTRAVENOUS at 01:59

## 2020-05-21 RX ADMIN — OXYCODONE HYDROCHLORIDE 2.5 MG: 5 TABLET ORAL at 10:29

## 2020-05-21 RX ADMIN — DOCUSATE SODIUM 100 MG: 100 CAPSULE, LIQUID FILLED ORAL at 05:29

## 2020-05-21 RX ADMIN — POLYETHYLENE GLYCOL 3350 1 PACKET: 17 POWDER, FOR SOLUTION ORAL at 10:31

## 2020-05-21 RX ADMIN — ACETAMINOPHEN 1000 MG: 500 TABLET, FILM COATED ORAL at 05:29

## 2020-05-21 RX ADMIN — TRAMADOL HYDROCHLORIDE 50 MG: 50 TABLET, FILM COATED ORAL at 05:29

## 2020-05-21 RX ADMIN — ASPIRIN 325 MG: 325 TABLET, DELAYED RELEASE ORAL at 10:33

## 2020-05-21 ASSESSMENT — LIFESTYLE VARIABLES
TOTAL SCORE: 0
EVER HAD A DRINK FIRST THING IN THE MORNING TO STEADY YOUR NERVES TO GET RID OF A HANGOVER: NO
HOW MANY TIMES IN THE PAST YEAR HAVE YOU HAD 5 OR MORE DRINKS IN A DAY: 0
AVERAGE NUMBER OF DAYS PER WEEK YOU HAVE A DRINK CONTAINING ALCOHOL: 0
TOTAL SCORE: 0
CONSUMPTION TOTAL: NEGATIVE
TOTAL SCORE: 0
EVER FELT BAD OR GUILTY ABOUT YOUR DRINKING: NO
ALCOHOL_USE: NO
HAVE YOU EVER FELT YOU SHOULD CUT DOWN ON YOUR DRINKING: NO
ON A TYPICAL DAY WHEN YOU DRINK ALCOHOL HOW MANY DRINKS DO YOU HAVE: 0
HAVE PEOPLE ANNOYED YOU BY CRITICIZING YOUR DRINKING: NO

## 2020-05-21 ASSESSMENT — GAIT ASSESSMENTS
DISTANCE (FEET): 150
DEVIATION: ANTALGIC;STEP TO
GAIT LEVEL OF ASSIST: SUPERVISED
ASSISTIVE DEVICE: FRONT WHEEL WALKER

## 2020-05-21 ASSESSMENT — COGNITIVE AND FUNCTIONAL STATUS - GENERAL
SUGGESTED CMS G CODE MODIFIER MOBILITY: CJ
SUGGESTED CMS G CODE MODIFIER DAILY ACTIVITY: CJ
TOILETING: A LITTLE
HELP NEEDED FOR BATHING: A LITTLE
DRESSING REGULAR LOWER BODY CLOTHING: A LITTLE
MOBILITY SCORE: 21
MOVING FROM LYING ON BACK TO SITTING ON SIDE OF FLAT BED: A LITTLE
STANDING UP FROM CHAIR USING ARMS: A LITTLE
PERSONAL GROOMING: A LITTLE
CLIMB 3 TO 5 STEPS WITH RAILING: A LITTLE
DAILY ACTIVITIY SCORE: 20

## 2020-05-21 NOTE — PROGRESS NOTES
C/o mild pain  AVSS  Dressing c/d/i  Motor intact ehl/fhl  Sensation intact sp/dp/pt  dp 2+    S/p Rt KIA  Doing well  D/c home after PT

## 2020-05-21 NOTE — PROGRESS NOTES
Seen by OT , PT and Dr Fonseca all discharge criteria met, dressing CDI with neurovasc status intact, reviewed with pt and wife via Facetime on phone total hip care for home and multiple questions answered.

## 2020-05-21 NOTE — THERAPY
Physical Therapy   Daily Treatment     Patient Name: Jhony Rodriguez  Age:  69 y.o., Sex:  male  Medical Record #: [id number]  Today's Date: 5/21/2020     Precautions  Precautions: Posterior Hip Precautions, Weight Bearing As Tolerated Right Lower Extremity      Assessment    Pt is progressing well with therapy and able to demonstrate improved ambulation distance and activity tolerance. Pt's balance has slightly improved, however, pt continues to require significant assistance from FWW and demonstrates with tremulous activity during ambulation as fatigue increases. Pt educated on pacing and taking rest breaks to improve activity tolerance. Pt presents with occasional poor safety awareness as he tends to leave the FWW behind during transfers. Pt has a difficult times standing from lower surfaces and requires cues and demo for safe mechanics to maintain posterior hip precautions. Pt will continue to benefit from skilled PT while in house. Anticipate pt to d/c home with family and recs for OP therapy services.      05/21/20 1020   Other Treatments   Other Treatments Provided Pt provided with demo for appropriate car transfer; educated on safe mechanics and correct use of step to enter car. Pt able to demonstrate safe mechanics with simulation using a small step onto higher bed    Balance   Sitting Balance (Static) Good   Sitting Balance (Dynamic) Fair +   Standing Balance (Static) Fair   Standing Balance (Dynamic) Fair -   Weight Shift Sitting Good   Weight Shift Standing Fair   Skilled Intervention Verbal Cuing;Facilitation   Comments presents with heavy reliance on FWW during ambulation   Gait Analysis   Gait Level Of Assist Supervised  (close guarding)   Assistive Device Front Wheel Walker   Distance (Feet) 150   # of Times Distance was Traveled 1   Deviation Antalgic;Step To   # of Stairs Climbed 2   Level of Assist with Stairs Minimal Assist   Weight Bearing Status WBAT RLE   Skilled Intervention Verbal  Cuing;Facilitation;Compensatory Strategies   Comments required frequent cues for recipricol gait pattern and lifting RLE higher to prevent tripping over R toes    Bed Mobility    Scooting Supervised   Functional Mobility   Sit to Stand Minimal Assist   Bed, Chair, Wheelchair Transfer Supervised   Skilled Intervention Tactile Cuing;Verbal Cuing   Short Term Goals    Short Term Goal # 1 Pt will be able to perform bed mobility and sup <> sit Charley in 6 visits.   Goal Outcome # 1 goal not met   Short Term Goal # 2 Pt will be able to perform sit <> stand and transfer Charley in 6 visits so can DC home safely.   Goal Outcome # 2 Goal not met   Short Term Goal # 3 Pt will be able to ambulate 150 ft with FWW Charley in 6 visits so can DC home safely.   Goal Outcome # 3 Goal met   Short Term Goal # 4 Pt will be able to go up/down 1 step with FWW Charley in 6 visits so can enter home safely.   Goal Outcome # 4 Goal not met   Anticipated Discharge Equipment   DC Equipment Front-Wheel Walker       Plan    Continue current treatment plan.    Discharge recommendations:  Recommend outpatient physical therapy services to address higher level deficits.

## 2020-05-21 NOTE — DISCHARGE INSTRUCTIONS
Discharge Instructions    Discharged to home by car with relative. Discharged via wheelchair, hospital escort: Yes.  Special equipment needed: Walker    Be sure to schedule a follow-up appointment with your primary care doctor or any specialists as instructed.     Discharge Plan:   Diet Plan: Discussed  Activity Level: Discussed  Confirmed Follow up Appointment: Appointment Scheduled  Confirmed Symptoms Management: Discussed  Medication Reconciliation Updated: Yes    I understand that a diet low in cholesterol, fat, and sodium is recommended for good health. Unless I have been given specific instructions below for another diet, I accept this instruction as my diet prescription.   Other diet: Regular    Special Instructions: Discharge instructions for the Orthopedic Patient    Follow up with Primary Care Physician within 2 weeks of discharge to home, regarding:  Review of medications and diagnostic testing.  Surveillance for medical complications.  Workup and treatment of osteoporosis, if appropriate.     -Is this a Hip/Knee/Shoulder Joint Replacement patient? Yes Total Hip Replacement, After-Care Guidelines      These instructions provide you with information on caring for yourself and your hip after surgery. Your health care provider may also give you instructions that are more specific. Your treatment was planned and performed according to current medical practices but problems sometimes occur. Call your health care provider if you have any problems or questions.     WHAT TO EXPECT AFTER THE PROCEDURE  After your procedure, your hip will typically be stiff, sore, and bruised. This will improve over time.      Pain  - Follow your home pain management plan as discussed with your nurse and as directed by your provider.  - It is important to follow any scheduled pain medications for maximal pain relief.  - If prescribed opioid medication, the goal is to use opioids only as needed and to wean off prescription pain  medicine as soon as possible.  - Ice use for pain control.  o Put ice in a plastic bag.  o Place a towel between your skin and the bag.  o Leave the ice on for 20 minutes, 2-3 times a day at a minimum.  - Most patients are off the pain pills by 3 weeks.  If your pain continues to be severe, follow up with your provider.    Infection  Deep hip joint infections that require removal of the prostheses occur in less than 1.0% of patients. Lesser infections in the skin (cellulites) are more common and much more easily treated.  - Keep the incision as clean and dry as possible.  - Always wash your hands before touching your incision.  - Avoid dental care for 3 months after surgery. Your provider may recommend taking a dose of antibiotics an hour prior to any dental procedure. After 2 years, most providers recommend antibiotics only before an extensive procedure.  Ask your provider what they recommend.  - Signs and symptoms of infection include low-grade fever, redness, pain, swelling and drainage from your incision. Notify your provider IMMEDIATELY if you develop ANY of these symptoms.    Post op Disturbances  - Bowel habits - constipation is extremely common and caused by a combination of anesthesia, lack of mobility, dehydration and pain medicine. Use stool softeners or laxatives if necessary. It is important not to ignore this problem as bowel obstructions can be a serious complication after joint replacement surgery.  - Mood/Energy Level - Many patients experience a lack of energy and endurance for up to 2-3 months after surgery. Some people feel down and can even become depressed. This is likely due to postoperative anemia, change in activity level, lack of sleep, pain medicine and just the emotional reaction to the surgery itself that is a big disruption in a person’s life.  This usually passes.  If symptoms persist, follow up with your primary care provider.  - Returning to work - Your provider will give you  specific instructions based on your profession.  Generally, if you work a sedentary job requiring little standing or walking, most patients may return within 2-6 weeks.  Manual labor jobs involving walking, lifting and standing may take 3-4 months.  Your provider’s office can provide a release to part-time or light duty work early on in your recovery and progress you to full duty as able.  - Driving - You can begin driving once cleared by your provider, provided you are no longer taking narcotic pain medication or any other medications that impair driving. Discuss the length of time with your doctor as returning to driving will depend on things such as your vehicle, which hip was replaced (right or left) and if you do or do not have post-operative movement precautions.  - Avoiding falls - A fall during the first few weeks after surgery can damage your new hip and may result in a need for further surgery.   throw rugs and tack down loose carpeting.  Be aware of floor hazards such as pets, small objects or uneven surfaces. Notify your provider of any falls.   - [employer] Metal Detectors - The sensitivity of metal detectors varies and it is likely that your prosthesis will cause an alarm. Inform the  of your artificial joint.    Diet  - Resume your normal diet as tolerated.  - It is important to achieve a healthy nutritional status by eating a well-balanced diet on a regular basis.  - Your provider may recommend that you take iron and vitamin supplements.   - Continue to drink plenty of fluids.    Shower/Bathing  - You may shower as soon as you get home from the hospital unless otherwise instructed.  - Keep your incision out of water to prevent infection. To keep the incision dry when showering, cover it with a plastic bag or plastic wrap. If your bandage is waterproof, this may not be necessary.  o Pat incision dry if it gets wet. Do not rub. Notify your provider.  - Do not submerge in a bath  until cleared by your provider.  Your staples must be out and the incision completely healed.    Dressing Changes:  Only change your dressing if directed by your provider.  - Wash hands.  - Open all dressing change materials.  - Remove old dressing and discard.  - Inspect incision for signs of irritation or infection including redness, increase in clear drainage, yellow/green drainage, odor and surrounding skin hot to touch.  Notify your provider if present.  -  the new dressing by one corner and lay over the incision.  Be careful not to touch the inside of the dressing that will lay over the incision.  - Secure in place as instructed.    Swelling/Bruising  - Swelling is normal after hip replacement and can involve the thigh, knee, calf and foot.  - Swelling can last from 3-6 months.  - To reduce swelling, elevate your leg higher than your heart while reclining.  The first week you are home you should elevate your leg an equal amount of time as you are active.    - The swelling is usually worse after you go home since you are upright for longer periods of time.  - Bruising often does not appear until after you arrive home and can be quite dramatic- appearing purple, black, or green.  Bruising is typically not concerning and will subside without any treatment.      Blood Clot Prevention  Your treatment plan includes multiple preventative measures to decrease the risk of blood clots in the legs (DVTs) and the less common, but serious, clots that travel to the lungs (pulmonary emboli). Most patients are at standard risk for them, but people who are at higher risk include those who have had previous clots, a family history of clotting, smoking, diabetes, obesity, advanced age, use estrogen and/or live a sedentary lifestyle.    - Signs of blood clots in legs include - Swelling in thigh, calf or ankle that does not go down with elevation.  Pain, heat and tenderness in calf, back of calf or groin area.  NOTE: blood  clots can occur in either leg.  - Signs of blood clots in lungs include - Sudden increased shortness of breath, sudden onset of chest pain, and localized chest pain with coughing.  - If you experience any of the above symptoms, notify your provider and seek medical attention immediately.  - You received anticoagulant therapy (blood thinners) in the hospital.  Continue the prescribed blood-thinning medication at home, as directed by your provider.   - Your risk for developing a clot continues for up to 2-3 months after surgery.  Avoid prolonged sitting and dehydration (long air trips and car trips).  If you do take a trip during this time, please get up, move around every 1-1.5 hours, and discuss all travel plans with your provider.      Activity  Once you get home, stay active. The key is not to overdo it.  While you can expect some good days and some bad days, you should notice a gradual improvement over time and a gradual increase in endurance over the next 6 to 12 months.    - Weight Bearing - You can begin to bear weight as tolerated unless otherwise directed by your provider. Use a walker, crutches or cane to assist with walking until you can walk smoothly (minimal or no limp) without assistance.   - Physical Precautions - To assure proper recovery and tissue healing, you may be asked to take special precautions when moving (sitting, bending or sleeping) - usually for the first 6 weeks after surgery. Precautions vary from patient to patient depending on surgical approach used by your provider. Follow any specific precautions provided by your provider and physical therapist until cleared by your provider.   - Sitting - Early on it is easier to get up from a tall chair or a chair with arms. The physical therapist will show you how to sit and stand from a chair keeping your affected leg out in front of you. Get up and move around on a regular basis--at least once every hour.  - Walking - Walk as much as you like  once your doctor gives permission to proceed, but remember that walking is no substitute for your prescribed exercises.   o Follow the home exercise program prescribed during your hospital stay as directed by your physical therapist or provider.  - Continued physical therapy may be prescribed after hospital discharge to strengthen your muscles and improve your gait/walking pattern. The decision to have therapy or not after the hospital stay is made by you and your provider prior to surgery or at your follow up appointment.    - Swimming is an excellent low impact activity; you can begin as soon as the wound is healed and your provider clears you. Using a pair of training fins may make swimming a more enjoyable and effective exercise.  - Sexual activity - Your provider can tell you when it is safe to resume sexual activity.   - Other activities - Low impact activities are preferred. If you have specific questions, consult your provider.      When to Call the Doctor   Call the provider if you experience:   - Fever over 100.5° F  - Increased pain, drainage, redness, odor or heat around the incision area  - Shaking chills  - Increased knee pain with activity and rest  - Increased pain in calf, tenderness or redness above or below the knee  - Increased swelling of calf, ankle, foot  - Sudden increased shortness of breath, sudden onset of chest pain, localized chest pain with coughing  - Incision opening  Or, if there are any questions or concerns about medications or care.      Infection statistic resource:  [email or web address]-joint-infection-epidemiology-microbiology-[email or web address]-manifestations-and-diagnosis    -Is this patient being discharged with medication to prevent blood clots?  Yes, Aspirin Aspirin, ASA oral tablets  What is this medicine?  ASPIRIN (AS pir in) is a pain reliever. It is used to treat mild pain and fever. This medicine is also used as directed by a doctor to prevent and to  treat heart attacks, to prevent strokes, and to treat arthritis or inflammation.  This medicine may be used for other purposes; ask your health care provider or pharmacist if you have questions.  COMMON BRAND NAME(S): Aspir-Low, Aspir-Kylie, Aspirtab, Anand Advanced Aspirin, Anand Aspirin, Anand Aspirin Extra Strength, Anand Aspirin Plus, Anand Extra Strength, Anand Extra Strength Plus, Anand Genuine Aspirin, Anand Womens Aspirin, Bufferin, Bufferin Extra Strength, Bufferin Low Dose  What should I tell my health care provider before I take this medicine?  They need to know if you have any of these conditions:  -anemia  -asthma  -bleeding problems  -child with chickenpox, the flu, or other viral infection  -diabetes  -gout  -if you frequently drink alcohol containing drinks  -kidney disease  -liver disease  -low level of vitamin K  -lupus  -smoke tobacco  -stomach ulcers or other problems  -an unusual or allergic reaction to aspirin, tartrazine dye, other medicines, dyes, or preservatives  -pregnant or trying to get pregnant  -breast-feeding  How should I use this medicine?  Take this medicine by mouth with a glass of water. Follow the directions on the package or prescription label. You can take this medicine with or without food. If it upsets your stomach, take it with food. Do not take your medicine more often than directed.  Talk to your pediatrician regarding the use of this medicine in children. While this drug may be prescribed for children as young as 12 years of age for selected conditions, precautions do apply. Children and teenagers should not use this medicine to treat chicken pox or flu symptoms unless directed by a doctor.  Patients over 65 years old may have a stronger reaction and need a smaller dose.  Overdosage: If you think you have taken too much of this medicine contact a poison control center or emergency room at once.  NOTE: This medicine is only for you. Do not share this medicine with  others.  What if I miss a dose?  If you are taking this medicine on a regular schedule and miss a dose, take it as soon as you can. If it is almost time for your next dose, take only that dose. Do not take double or extra doses.  What may interact with this medicine?  Do not take this medicine with any of the following medications:  -cidofovir  -ketorolac  -probenecid  This medicine may also interact with the following medications:  -alcohol  -alendronate  -bismuth subsalicylate  -flavocoxid  -herbal supplements like feverfew, garlic, marie, ginkgo biloba, horse chestnut  -medicines for diabetes or glaucoma like acetazolamide, methazolamide  -medicines for gout  -medicines that treat or prevent blood clots like enoxaparin, heparin, ticlopidine, warfarin  -other aspirin and aspirin-like medicines  -NSAIDs, medicines for pain and inflammation, like ibuprofen or naproxen  -pemetrexed  -sulfinpyrazone  -varicella live vaccine  This list may not describe all possible interactions. Give your health care provider a list of all the medicines, herbs, non-prescription drugs, or dietary supplements you use. Also tell them if you smoke, drink alcohol, or use illegal drugs. Some items may interact with your medicine.  What should I watch for while using this medicine?  If you are treating yourself for pain, tell your doctor or health care professional if the pain lasts more than 10 days, if it gets worse, or if there is a new or different kind of pain. Tell your doctor if you see redness or swelling. Also, check with your doctor if you have a fever that lasts for more than 3 days. Only take this medicine to prevent heart attacks or blood clotting if prescribed by your doctor or health care professional.  Do not take aspirin or aspirin-like medicines with this medicine. Too much aspirin can be dangerous. Always read the labels carefully.  This medicine can irritate your stomach or cause bleeding problems. Do not smoke cigarettes  or drink alcohol while taking this medicine. Do not lie down for 30 minutes after taking this medicine to prevent irritation to your throat.  If you are scheduled for any medical or dental procedure, tell your healthcare provider that you are taking this medicine. You may need to stop taking this medicine before the procedure.  This medicine may be used to treat migraines. If you take migraine medicines for 10 or more days a month, your migraines may get worse. Keep a diary of headache days and medicine use. Contact your healthcare professional if your migraine attacks occur more frequently.  What side effects may I notice from receiving this medicine?  Side effects that you should report to your doctor or health care professional as soon as possible:  -allergic reactions like skin rash, itching or hives, swelling of the face, lips, or tongue  -breathing problems  -changes in hearing, ringing in the ears  -confusion  -general ill feeling or flu-like symptoms  -pain on swallowing  -redness, blistering, peeling or loosening of the skin, including inside the mouth or nose  -signs and symptoms of bleeding such as bloody or black, tarry stools; red or dark-brown urine; spitting up blood or brown material that looks like coffee grounds; red spots on the skin; unusual bruising or bleeding from the eye, gums, or nose  -trouble passing urine or change in the amount of urine  -unusually weak or tired  -yellowing of the eyes or skin  Side effects that usually do not require medical attention (report to your doctor or health care professional if they continue or are bothersome):  -diarrhea or constipation  -headache  -nausea, vomiting  -stomach gas, heartburn  This list may not describe all possible side effects. Call your doctor for medical advice about side effects. You may report side effects to FDA at 8-032-FDA-6251.  Where should I keep my medicine?  Keep out of the reach of children.  Store at room temperature between 15  and 30 degrees C (59 and 86 degrees F). Protect from heat and moisture. Do not use this medicine if it has a strong vinegar smell. Throw away any unused medicine after the expiration date.  NOTE: This sheet is a summary. It may not cover all possible information. If you have questions about this medicine, talk to your doctor, pharmacist, or health care provider.  © 2018 [employer] (2014-08-19 11:30:31)      · Is patient discharged on Warfarin / Coumadin?   No     Depression / Suicide Risk    As you are discharged from this [facility] facility, it is important to learn how to keep safe from harming yourself.    Recognize the warning signs:  · Abrupt changes in personality, positive or negative- including increase in energy   · Giving away possessions  · Change in eating patterns- significant weight changes-  positive or negative  · Change in sleeping patterns- unable to sleep or sleeping all the time   · Unwillingness or inability to communicate  · Depression  · Unusual sadness, discouragement and loneliness  · Talk of wanting to die  · Neglect of personal appearance   · Rebelliousness- reckless behavior  · Withdrawal from people/activities they love  · Confusion- inability to concentrate     If you or a loved one observes any of these behaviors or has concerns about self-harm, here's what you can do:  · Talk about it- your feelings and reasons for harming yourself  · Remove any means that you might use to hurt yourself (examples: pills, rope, extension cords, firearm)  · Get professional help from the community (Mental Health, Substance Abuse, psychological counseling)  · Do not be alone:Call your Safe Contact- someone whom you trust who will be there for you.  · Call your local CRISIS HOTLINE 815-6270 or 826-301-8453  · Call your local Children's Mobile Crisis Response Team [place] (560) 002-8615 or www.[employer]  · Call the toll free National Suicide Prevention Hotlines   · National  Suicide Prevention Lifeline 204-209-LZUG (0837)  · National [place] Line Network 800-SUICIDE (512-8885)

## 2020-05-21 NOTE — THERAPY
"Occupational Therapy   Initial Evaluation     Patient Name: Jhony Rodriguez  Age:  69 y.o., Sex:  male  Medical Record #: [id number]  Today's Date: 5/21/2020     Precautions  Precautions: Posterior Hip Precautions, Weight Bearing As Tolerated Right Lower Extremity    Assessment  Patient is 69 y.o. male admit for R KIA.  Previously indep with ADl's, limited overall by pain and impaired endurance.  Pt required Alyssa and for dressing with AE, and max verbal cues.  Harsha for sit to stand- shaky when first upright.  Extensive education provided Re: safety with ADL's especially with postponing a shower until activity tolerance and balance are better and pain is better managed.  Pt understands he is limited, but demo's poor safety awareness in how to compensate for those temporary limitations.  Pt appears he'll be safe to go home with spouse assist if cleared medically.  No further OT needs at this time.      Subjective    \"I had not idea this was gonna be so hard- I am really overwhelmed.\"     Objective     05/21/20 0953   Prior Living Situation   Prior Services Home-Independent   Housing / Facility 1 Story House   Steps Into Home 1   Steps In Home 1   Rail None   Bathroom Set up Walk In Shower  (low built in seat)   Equipment Owned Crutches;Raised Toilet Seat Without Arms  (lift recliner)   Lives with - Patient's Self Care Capacity Spouse   Comments Spouse home and available, however pt indicated feeling like she would not be quickly attentive to his needs.  He stated \"She does her own thing on her own timeline.  It's better if I don't ask her for a lot because I just get frustrated.\"   Prior Level of ADL Function   Comments Indep with all self care PTA   Prior Level of IADL Function   Medication Management Independent   Laundry Independent   Kitchen Mobility Independent   Finances Independent   Home Management Independent   Shopping Independent   Prior Level Of Mobility Independent Without Device in Community   Driving " / Transportation Driving Independent   Occupation (Pre-Hospital Vocational) Retired Due To Age   Leisure Interests Unable To Determine At This Time   Cognition    Cognition / Consciousness X   Level of Consciousness Alert   Safety Awareness Impaired;Impulsive   Comments Pt anxious, appears overwhelmed, borderline emotional at times.  Needed frequent repetition of instructions for safety with ADL's and transfers, and reassurance that symptoms will improve with time   Strength Upper Body   Upper Body Strength  WDL   Comments WFL for normal ADl's, not adequate as a good assist for transfers due to pt's height and body mass   Balance Assessment   Sitting Balance (Static) Good   Sitting Balance (Dynamic) Fair +   Standing Balance (Static) Fair   Standing Balance (Dynamic) Fair -   Weight Shift Sitting Good   Weight Shift Standing Fair   Comments with FWW, Unsteady initially upon standing each time   ADL Assessment   Eating Independent   Grooming Supervision;Seated   Bathing   (educatedin detail on pt not being safe to shower yet)   Upper Body Dressing Modified Independent   Lower Body Dressing Minimal Assist  (with use of AE)   Toileting   (pt declined need to use BR- has RTS at home)   Comments Pt really liked all the AE from the hip kit that OT demo'd- plans to get another reacher and sockaide for home   Functional Mobility   Sit to Stand Minimal Assist   Bed, Chair, Wheelchair Transfer Supervised   Transfer Method Stand Pivot   Mobility Close supervision with FWW to walk to chair in room   Activity Tolerance   Comments limited by pain, weakness, decreased confidence   Anticipated Discharge Equipment   DC Equipment Other (Comments)  (Hip Kit)     Plan    Recommend Occupational Therapy for Evaluation only.    Education provided:  Educated pt on role of OT and Posterior Total Hip Precautions with ADL's. Reviewed application of precautions and use of Adaptive Equipment for dressing, bathing, toileting, grooming, kitchen  activities and general functional mobility in the home. Reviewed the use of reacher, sockaide, long handled shoe horn and long handled sponge, as well as shower chair and raised toilet seat to assist with ADL's.  Reviewed stall shower transfers. Provided pt with ADL equipment handout and suggestions on where to obtain needed items.  Stressed importance of following posterior hip precautions with all IADL's, and having help to bring all commonly used items to counter reach level so that no bending is required through the course of a normal day.  Educated on covering incision with plastic wrap and skin tape for protection when showering. Pt verbalized understanding of all education provided.

## 2020-05-21 NOTE — PROGRESS NOTES
1315 Received to rm [id number] from PACU awake and alert skin pale, dressing CDI to rt hip with neurovasc status intact. 1800 Up in chair for dinner voided x3 without diff, ambulated in stubbs x50 ft x2,.

## 2020-05-21 NOTE — CARE PLAN
Problem: Communication  Goal: The ability to communicate needs accurately and effectively will improve  Outcome: PROGRESSING AS EXPECTED     Problem: Safety  Goal: Will remain free from injury  Outcome: PROGRESSING AS EXPECTED  Goal: Will remain free from falls  Outcome: PROGRESSING AS EXPECTED     Problem: Infection  Goal: Will remain free from infection  Outcome: PROGRESSING AS EXPECTED     Problem: Venous Thromboembolism (VTW)/Deep Vein Thrombosis (DVT) Prevention:  Goal: Patient will participate in Venous Thrombosis (VTE)/Deep Vein Thrombosis (DVT)Prevention Measures  Outcome: PROGRESSING AS EXPECTED     Problem: Bowel/Gastric:  Goal: Normal bowel function is maintained or improved  Outcome: PROGRESSING AS EXPECTED     Problem: Knowledge Deficit  Goal: Knowledge of disease process/condition, treatment plan, diagnostic tests, and medications will improve  Outcome: PROGRESSING AS EXPECTED     Problem: Discharge Barriers/Planning  Goal: Patient's continuum of care needs will be met  Outcome: PROGRESSING AS EXPECTED     Problem: Pain Management  Goal: Pain level will decrease to patient's comfort goal  Outcome: PROGRESSING AS EXPECTED     Problem: Fluid Volume:  Goal: Will maintain balanced intake and output  Outcome: PROGRESSING AS EXPECTED

## 2020-05-21 NOTE — PROGRESS NOTES
A&Ox4, VSS, pain tolerable at 2/10 in R hip, surgical dressing c/d/i, CMS intact, denies sob, dizziness, or nausea. POC discussed, denies further needs at this time. Safety measures and hourly rounding in place.

## 2020-05-22 NOTE — DISCHARGE SUMMARY
DATE OF ADMISSION:  5/20/2020.    DATE OF DISCHARGE:  5/21/2020.    PROCEDURE:  Right total hip arthroplasty.    HISTORY OF PRESENT ILLNESS:  This is a 69-year-old male with severe   degenerative joint disease of his right hip.  He has failed conservative   management and presents for operative treatment.  For further details of H and   P, please see chart.    HOSPITAL COURSE:  The patient was admitted and taken to the operating room for   the above named procedure.  He tolerated the procedure well and returned to   the floor.  He was advanced from a clear liquid diet to a general diet without   difficulty and from IV pain meds to oral pain meds with good pain control.    On the day of discharge, he is ambulating independently.  His pain is well   controlled.  He is felt stable to be discharged to home.    DISCHARGE INSTRUCTIONS:  The patient will be discharged to home.    DISCHARGE DIAGNOSIS:  Status post right total hip arthroplasty.    DISCHARGE MEDICATIONS:  1.  Tylenol 1000 mg p.o. q.6 hours.  2.  Tramadol 50 mg p.o. q.6 hours.  3.  Oxycodone 5-10 mg p.o. q.6 hours p.r.n. pain.  4.  Aspirin 325 mg p.o. daily.  5.  All prehospital medications as discussed.    The patient has been instructed to call my office for fever, redness, chills,   drainage or other problems.  He will start outpatient physical therapy.  He   will follow up in my office in 2 weeks' time.       ____________________________________     MD REJI Bates / JUSTINA    DD:  05/21/2020 08:23:47  DT:  05/22/2020 01:13:26    D#:  [id number]  Job#:  [id number]

## 2020-06-19 ENCOUNTER — PATIENT OUTREACH (OUTPATIENT)
Dept: HEALTH INFORMATION MANAGEMENT | Facility: OTHER | Age: 70
End: 2020-06-19

## 2020-07-01 NOTE — PROGRESS NOTES
A 69-year-old male was an elective admission to [facility] from 5/20/2020 to 5/21/2020 to treat osteoarthritis of hip. The Patient Navigator did not visit the patient bedside. The patient was discharged home. The patient was not under clinical case management.     The patient was ordered to start/continue to take the following medications: Acetaminophen, Tramadol Hydrochloride (Ultram), Oxycodone Ir (Oxy Ir), and Aspirin. The patient successfully filled all medications.    The patient did attend follow-up appointments however would not share appointment information with the patient navigator.    The patient was actively engaged with the patient navigator. The patient did not share appointment information with the patient navigator but did inform the patient navigator they were attending the appointments.  The patient progressed well and is recovering still from surgery.  The patient was thankful for the patient navigator checking in.

## 2020-09-16 ENCOUNTER — OFFICE VISIT (OUTPATIENT)
Dept: SLEEP MEDICINE | Facility: MEDICAL CENTER | Age: 70
End: 2020-09-16
Payer: MEDICARE

## 2020-09-16 VITALS
SYSTOLIC BLOOD PRESSURE: 122 MMHG | HEART RATE: 61 BPM | DIASTOLIC BLOOD PRESSURE: 72 MMHG | OXYGEN SATURATION: 95 % | WEIGHT: 315 LBS | HEIGHT: 77 IN | RESPIRATION RATE: 14 BRPM | BODY MASS INDEX: 37.19 KG/M2

## 2020-09-16 DIAGNOSIS — G47.33 OSA (OBSTRUCTIVE SLEEP APNEA): ICD-10-CM

## 2020-09-16 PROCEDURE — 99213 OFFICE O/P EST LOW 20 MIN: CPT | Performed by: FAMILY MEDICINE

## 2020-09-16 ASSESSMENT — FIBROSIS 4 INDEX: FIB4 SCORE: 1.319393800197651376

## 2020-09-16 NOTE — PROGRESS NOTES
[facility] Follow Up Note     Date: 9/16/2020 / Time: 1:28 PM    Patient ID:   Name:             Jhony Rodriguez   YOB: 1950  Age:                 70 y.o.  male   MRN:               [id number]      Thank you for requesting a sleep medicine consultation on Jhony Rodriguez at the sleep center. He presents today with the chief complaints of CARLOS follow up.     HISTORY OF PRESENT ILLNESS:       Pt is currently on BiPAP at 16/12 cm with FFM. He was previously on O2 bleed in however since the OPO was negative, the bleed in was dicontinued. He is getting about 7-9 hrs of sleep on a good night and about 5 hr of sleep on a bad night. The bad nights are about 1-2 per week. Overall, he does   finds his sleep refreshing if he gets enough hours of sleep. He does not take regular naps.He denies any symptoms of RLS, narcolepsy or any symptoms to suggest parasomnias such as nightmares, sleep walking or acting out of dreams.He is using BiPAP most days of the week. Pt reports 5 hrs of average nightly use of BiPAP. Pt denies snoring, gasping,choking.Pt also denies significant mask leak that is interfering with sleep. The 30 day compliance was downloaded which shows adequate compliance with more that 4 hr usage about 80%. The AHI is has improved to 5.4/hr. The mask leak is normal. The symptoms of CARLOS has improved.  Since his last visit he had OPO on 3/25/20. The study was done on  BiPAP 16/12 cm. The total analyzed time was 8 hrs 0 min. O2 Sat. nikolay was 76% and mean O2 sat was 86 % and baseline O2 at 93%. O2 sat was below 88% for 28 min of the flow evaluation time. Oxygen Desaturation (>=3%) Index was elevated at 4.9/hr.     SLEEP HISTORY   Split night study on 1/6/20 which showed  Severe obstructive sleep apnea with AHI of 50.4/hr and O2 nikolay 77 %. Due to severity of the disease he met the split study protocol. The titration started with CPAP 7 cm and the best tolerated was CPAP 15/11 cm. The  AHI improved to 8.9/hr with improved O2 nikolay of 72% and average O2 saturation of 90 %. However he had sleep related hypoxia.     He was initially diagnosed in 2012 and had titration study on 12/14/02. Based on the study, the best tolerated pressure was BiPAP 19/15 cm, the AHi improved to 0/hr and and O2 nikolay was 88%.       REVIEW OF SYSTEMS:       Constitutional: Denies fevers, Denies weight changes  Eyes: Denies changes in vision, no eye pain  Ears/Nose/Throat/Mouth: Denies nasal congestion or sore throat   Cardiovascular: Denies chest pain or palpitations   Respiratory: Denies shortness of breath , Denies cough  Gastrointestinal/Hepatic: Denies abdominal pain, nausea, vomiting, diarrhea, constipation or GI bleeding   Genitourinary: Deniesdysuria or frequency  Musculoskeletal/Rheum: Denies  joint pain and swelling   Skin/Breast: Denies rash,   Neurological: Denies headache, confusion, memory loss or focal weakness/parasthesias  Psychiatric: denies mood disorder   Sleep: +snoring     Comprehensive review of systems form is reviewed with the patient and is attached in the EMR.     PMH:  has a past medical history of Arthritis, Atrial fibrillation (HCC) (2007), H/O cardiac radiofrequency ablation (2006), Hip pain, right, Obesity, Obesity (BMI 30-39.9) (2/13/2018), Sleep apnea, and Snoring.  MEDS:   Current Outpatient Medications:   •  acetaminophen (TYLENOL) 500 MG Tab, Take 2 Tabs by mouth every 6 hours., Disp: 30 Tab, Rfl: 0  •  aspirin  MG Tablet Delayed Response, Take 1 Tab by mouth 2 Times a Day., Disp: 60 Tab, Rfl: 0  •  furosemide (LASIX) 40 MG Tab, as needed. As needed for swelling, Disp: , Rfl:   •  tamsulosin (FLOMAX) 0.4 MG capsule, TAKE 1 CAP BY MOUTH ONE-HALF HOUR AFTER BREAKFAST. (Patient taking differently: Take 0.4 mg by mouth every day.), Disp: 90 Cap, Rfl: 4  •  Cholecalciferol (VITAMIN D3) 2000 UNIT Cap, Take 1 Cap by mouth See Admin Instructions. 2 times a week, Disp: , Rfl:   •   Cyanocobalamin (B-12) 1000 MCG Tab CR, Take 1 Tab by mouth See Admin Instructions. 2 times a week, Disp: , Rfl:   •  potassium chloride (MICRO-K) 10 MEQ capsule, as needed. Take with Furosemide prn swelling, Disp: , Rfl:   •  Ferrous Sulfate Dried (SLOW RELEASE IRON) 45 MG Tab CR, Take 1 Tab by mouth See Admin Instructions. 2 times a week, Disp: , Rfl:   ALLERGIES:   Allergies   Allergen Reactions   • Penicillins Rash and Swelling     Rash  Tolerated cefazolin 5/5/15   • Meloxicam      alterred mentation     SURGHX:   Past Surgical History:   Procedure Laterality Date   • PB TOTAL HIP ARTHROPLASTY Right 5/20/2020    Procedure: ARTHROPLASTY, HIP, TOTAL;  Surgeon: Connor Fonseca M.D.;  Location: SURGERY [facility];  Service: Orthopedics   • KNEE ARTHROPLASTY TOTAL Left 5/5/2015    Procedure: KNEE ARTHROPLASTY TOTAL ;  Surgeon: Mike Michele M.D.;  Location: SURGERY [facility];  Service:    • KNEE ARTHROPLASTY TOTAL  1/6/2015    Performed by Mike Michele M.D. at SURGERY [facility]   • KNEE ARTHROPLASTY TOTAL Bilateral 2015    4 months apart   • INGUINAL HERNIA REPAIR  8/16/2013    Performed by Martin Dillon M.D. at SURGERY [facility]   • LAPAROTOMY  2006    bowel perforation repair, following gastric  bypass   • KNEE ARTHROSCOPY Left 2006    left knee meniscectomy; dr Michele   • WRIST ORIF Right 2005   • GASTRIC BYPASS LAPAROSCOPIC  2004    dr. gallo   • ARTHROSCOPY, KNEE       SOCHX:  reports that he has never smoked. He has never used smokeless tobacco. He reports current alcohol use of about 1.2 oz of alcohol per week. He reports that he does not use drugs..  FH:   Family History   Problem Relation Age of Onset   • Cancer Maternal Aunt    • Cancer Maternal Uncle    • Cancer Maternal Grandfather    • Thyroid Mother    • Heart Disease Mother    • Heart Disease Brother    • Sleep Apnea Neg Hx          Physical Exam:  Vitals/ General Appearance:   Weight/BMI: Body mass index is  "38.44 kg/m².  /72 (BP Location: Left arm, Patient Position: Sitting, BP Cuff Size: Adult)   Pulse 61   Resp 14   Ht 1.943 m (6' 4.5\")   Wt (!) 145.2 kg (320 lb)   SpO2 95%   Vitals:    09/16/20 1324   BP: 122/72   BP Location: Left arm   Patient Position: Sitting   BP Cuff Size: Adult   Pulse: 61   Resp: 14   SpO2: 95%   Weight: (!) 145.2 kg (320 lb)   Height: 1.943 m (6' 4.5\")       Pt. is alert and oriented to time, place and person. Cooperative and in no apparent distress.       Constitutional: Alert, no distress, well-groomed.  Skin: No rashes in visible areas.  Eye: Round. Conjunctiva clear, lids normal. No icterus.   Neck: No masses, no thyromegaly. Moves freely without pain.  -. Lungs auscultation: B/L good air entry, vesicular breath sounds, no adventitious sounds  -. Heart auscultation: 1st and 2nd heart sounds normal, regular rhythm. No appreciable murmur.  - Extremities: no clubbing, no pedal edema.  - NEUROLOGICAL EXAMINATION: On neurological exam, the patient was alert and oriented x3. speech was clear and fluent without dysarthria.    ASSESSMENT AND PLAN     1. Sleep Apnea   He is urged to avoid supine sleep, weight gain and alcoholic beverages since all of these can worsen sleep apnea. He is cautioned against drowsy driving. If He feels sleepy while driving, He must pull over for a break/nap, rather than persist on the road, in the interest of He own safety and that of others on the road.   Plan   - Continue BiPAP at 17/13 cm with FFM at due to the border line AHI   - supplies are refilled    - compliance download was reviewed and discussed with the pt   - compliance was reinforced     2. Regarding treatment of other past medical problems and general health maintenance,  He is urged to follow up with PCP.      "

## 2020-09-16 NOTE — PATIENT INSTRUCTIONS
"  CPAP and BPAP Information  CPAP and BPAP are methods of helping a person breathe with the use of air pressure. CPAP stands for \"continuous positive airway pressure.\" BPAP stands for \"bi-level positive airway pressure.\" In both methods, air is blown through your nose or mouth and into your air passages to help you breathe well.  CPAP and BPAP use different amounts of pressure to blow air. With CPAP, the amount of pressure stays the same while you breathe in and out. With BPAP, the amount of pressure is increased when you breathe in (inhale) so that you can take larger breaths. Your health care provider will recommend whether CPAP or BPAP would be more helpful for you.  Why are CPAP and BPAP treatments used?  CPAP or BPAP can be helpful if you have:  · Sleep apnea.  · Chronic obstructive pulmonary disease (COPD).  · Heart failure.  · Medical conditions that weaken the muscles of the chest including muscular dystrophy, or neurological diseases such as amyotrophic lateral sclerosis (ALS).  · Other problems that cause breathing to be weak, abnormal, or difficult.  CPAP is most commonly used for obstructive sleep apnea (CARLOS) to keep the airways from collapsing when the muscles relax during sleep.  How is CPAP or BPAP administered?  Both CPAP and BPAP are provided by a small machine with a flexible plastic tube that attaches to a plastic mask. You wear the mask. Air is blown through the mask into your nose or mouth. The amount of pressure that is used to blow the air can be adjusted on the machine. Your health care provider will determine the pressure setting that should be used based on your individual needs.  When should CPAP or BPAP be used?  In most cases, the mask only needs to be worn during sleep. Generally, the mask needs to be worn throughout the night and during any daytime naps. People with certain medical conditions may also need to wear the mask at other times when they are awake. Follow instructions from " your health care provider about when to use the machine.  What are some tips for using the mask?    · Because the mask needs to be snug, some people feel trapped or closed-in (claustrophobic) when first using the mask. If you feel this way, you may need to get used to the mask. One way to do this is by holding the mask loosely over your nose or mouth and then gradually applying the mask more snugly. You can also gradually increase the amount of time that you use the mask.  · Masks are available in various types and sizes. Some fit over your mouth and nose while others fit over just your nose. If your mask does not fit well, talk with your health care provider about getting a different one.  · If you are using a mask that fits over your nose and you tend to breathe through your mouth, a chin strap may be applied to help keep your mouth closed.  · The CPAP and BPAP machines have alarms that may sound if the mask comes off or develops a leak.  · If you have trouble with the mask, it is very important that you talk with your health care provider about finding a way to make the mask easier to tolerate. Do not stop using the mask. Stopping the use of the mask could have a negative impact on your health.  What are some tips for using the machine?  · Place your CPAP or BPAP machine on a secure table or stand near an electrical outlet.  · Know where the on/off switch is located on the machine.  · Follow instructions from your health care provider about how to set the pressure on your machine and when you should use it.  · Do not eat or drink while the CPAP or BPAP machine is on. Food or fluids could get pushed into your lungs by the pressure of the CPAP or BPAP.  · Do not smoke. Tobacco smoke residue can damage the machine.  · For home use, CPAP and BPAP machines can be rented or purchased through home health care companies. Many different brands of machines are available. Renting a machine before purchasing may help you  find out which particular machine works well for you.  · Keep the CPAP or BPAP machine and attachments clean. Ask your health care provider for specific instructions.  Get help right away if:  · You have redness or open areas around your nose or mouth where the mask fits.  · You have trouble using the CPAP or BPAP machine.  · You cannot tolerate wearing the CPAP or BPAP mask.  · You have pain, discomfort, and bloating in your abdomen.  Summary  · CPAP and BPAP are methods of helping a person breathe with the use of air pressure.  · Both CPAP and BPAP are provided by a small machine with a flexible plastic tube that attaches to a plastic mask.  · If you have trouble with the mask, it is very important that you talk with your health care provider about finding a way to make the mask easier to tolerate.  This information is not intended to replace advice given to you by your health care provider. Make sure you discuss any questions you have with your health care provider.  Document Released: 09/15/2005 Document Revised: 04/08/2020 Document Reviewed: 11/06/2017  Elsevier Patient Education © 2020 [employer]

## 2020-09-21 ENCOUNTER — IMMUNIZATION (OUTPATIENT)
Dept: SOCIAL WORK | Facility: CLINIC | Age: 70
End: 2020-09-21
Payer: MEDICARE

## 2020-09-21 DIAGNOSIS — Z23 NEED FOR VACCINATION: ICD-10-CM

## 2020-09-21 PROCEDURE — 90662 IIV NO PRSV INCREASED AG IM: CPT | Performed by: REGISTERED NURSE

## 2020-09-21 PROCEDURE — G0008 ADMIN INFLUENZA VIRUS VAC: HCPCS | Performed by: REGISTERED NURSE

## 2021-03-03 DIAGNOSIS — Z23 NEED FOR VACCINATION: ICD-10-CM

## 2021-03-08 RX ORDER — TAMSULOSIN HYDROCHLORIDE 0.4 MG/1
0.4 CAPSULE ORAL
Qty: 100 CAPSULE | Refills: 4 | Status: SHIPPED | OUTPATIENT
Start: 2021-03-08 | End: 2022-06-07

## 2021-06-09 ENCOUNTER — NON-PROVIDER VISIT (OUTPATIENT)
Dept: MEDICAL GROUP | Age: 71
End: 2021-06-09
Payer: MEDICARE

## 2021-06-09 ENCOUNTER — TELEPHONE (OUTPATIENT)
Dept: MEDICAL GROUP | Age: 71
End: 2021-06-09

## 2021-06-09 DIAGNOSIS — Z23 NEED FOR VACCINATION: ICD-10-CM

## 2021-06-09 PROCEDURE — 90750 HZV VACC RECOMBINANT IM: CPT | Performed by: INTERNAL MEDICINE

## 2021-06-09 PROCEDURE — 90471 IMMUNIZATION ADMIN: CPT | Performed by: INTERNAL MEDICINE

## 2021-06-09 NOTE — PROGRESS NOTES
"Sukhdeep Rodriguez is a 70 y.o. male here for a non-provider visit for:   SHINGRIX (Shingles)    Reason for immunization: Overdue/Provider Recommended  Immunization records indicate need for vaccine: Yes, confirmed with Epic  Minimum interval has been met for this vaccine: Yes  ABN completed: No    VIS Dated  10/30/2019 was given to patient: Yes  All IAC Questionnaire questions were answered \"No.\"    Patient tolerated injection and no adverse effects were observed or reported: Yes    Pt scheduled for next dose in series: No  "

## 2021-06-09 NOTE — TELEPHONE ENCOUNTER
Patient is on the MA Schedule today for Shingles vaccine/injection.    SPECIFIC Action To Be Taken: Orders pending, please sign.

## 2021-08-13 ENCOUNTER — TELEPHONE (OUTPATIENT)
Dept: MEDICAL GROUP | Age: 71
End: 2021-08-13

## 2021-08-13 ENCOUNTER — NON-PROVIDER VISIT (OUTPATIENT)
Dept: MEDICAL GROUP | Age: 71
End: 2021-08-13
Payer: MEDICARE

## 2021-08-13 DIAGNOSIS — Z23 NEED FOR VACCINATION: ICD-10-CM

## 2021-08-13 PROCEDURE — 90471 IMMUNIZATION ADMIN: CPT | Performed by: FAMILY MEDICINE

## 2021-08-13 PROCEDURE — 90750 HZV VACC RECOMBINANT IM: CPT | Performed by: FAMILY MEDICINE

## 2021-08-13 NOTE — PROGRESS NOTES
"Sukhdeep Rodriguez is a 71 y.o. male here for a non-provider visit for:   SHINGRIX (Shingles)    Reason for immunization: continue or complete series started at the office  Immunization records indicate need for vaccine: Yes, confirmed with Epic  Minimum interval has been met for this vaccine: Yes  ABN completed: Not Indicated    VIS Dated  10/30/2019 was given to patient: Yes  All IAC Questionnaire questions were answered \"No.\"    Patient tolerated injection and no adverse effects were observed or reported: Yes    Pt scheduled for next dose in series: Not Indicated    "

## 2021-08-13 NOTE — TELEPHONE ENCOUNTER
1. Caller Name: Jhony Rodriguez                          Call Back Number: [phone] (home) [phone] (work)        How would the patient prefer to be contacted with a response: Phone call OK to leave a detailed message    Patient is on the MA Schedule today for 2nd shingles vaccine/injection.    SPECIFIC Action To Be Taken: Orders pending, please sign.

## 2021-09-10 ENCOUNTER — TELEPHONE (OUTPATIENT)
Dept: MEDICAL GROUP | Age: 71
End: 2021-09-10

## 2021-09-10 DIAGNOSIS — E78.5 DYSLIPIDEMIA: ICD-10-CM

## 2021-09-10 DIAGNOSIS — N40.0 BENIGN PROSTATIC HYPERPLASIA WITHOUT LOWER URINARY TRACT SYMPTOMS: ICD-10-CM

## 2021-09-10 DIAGNOSIS — Z11.59 NEED FOR HEPATITIS C SCREENING TEST: ICD-10-CM

## 2021-09-14 DIAGNOSIS — N41.0 ACUTE PROSTATITIS: ICD-10-CM

## 2021-09-14 RX ORDER — SULFAMETHOXAZOLE AND TRIMETHOPRIM 800; 160 MG/1; MG/1
1 TABLET ORAL 2 TIMES DAILY
Qty: 60 TABLET | Refills: 0 | Status: SHIPPED | OUTPATIENT
Start: 2021-09-14 | End: 2022-01-14 | Stop reason: SDUPTHER

## 2021-09-14 SDOH — ECONOMIC STABILITY: HOUSING INSECURITY
IN THE LAST 12 MONTHS, WAS THERE A TIME WHEN YOU DID NOT HAVE A STEADY PLACE TO SLEEP OR SLEPT IN A SHELTER (INCLUDING NOW)?: NO

## 2021-09-14 SDOH — HEALTH STABILITY: PHYSICAL HEALTH: ON AVERAGE, HOW MANY DAYS PER WEEK DO YOU ENGAGE IN MODERATE TO STRENUOUS EXERCISE (LIKE A BRISK WALK)?: 4 DAYS

## 2021-09-14 SDOH — ECONOMIC STABILITY: TRANSPORTATION INSECURITY
IN THE PAST 12 MONTHS, HAS THE LACK OF TRANSPORTATION KEPT YOU FROM MEDICAL APPOINTMENTS OR FROM GETTING MEDICATIONS?: NO

## 2021-09-14 SDOH — ECONOMIC STABILITY: INCOME INSECURITY: HOW HARD IS IT FOR YOU TO PAY FOR THE VERY BASICS LIKE FOOD, HOUSING, MEDICAL CARE, AND HEATING?: NOT HARD AT ALL

## 2021-09-14 SDOH — ECONOMIC STABILITY: FOOD INSECURITY: WITHIN THE PAST 12 MONTHS, THE FOOD YOU BOUGHT JUST DIDN'T LAST AND YOU DIDN'T HAVE MONEY TO GET MORE.: NEVER TRUE

## 2021-09-14 SDOH — ECONOMIC STABILITY: TRANSPORTATION INSECURITY
IN THE PAST 12 MONTHS, HAS LACK OF RELIABLE TRANSPORTATION KEPT YOU FROM MEDICAL APPOINTMENTS, MEETINGS, WORK OR FROM GETTING THINGS NEEDED FOR DAILY LIVING?: NO

## 2021-09-14 SDOH — ECONOMIC STABILITY: HOUSING INSECURITY: IN THE LAST 12 MONTHS, HOW MANY PLACES HAVE YOU LIVED?: 1

## 2021-09-14 SDOH — ECONOMIC STABILITY: INCOME INSECURITY: IN THE LAST 12 MONTHS, WAS THERE A TIME WHEN YOU WERE NOT ABLE TO PAY THE MORTGAGE OR RENT ON TIME?: NO

## 2021-09-14 SDOH — ECONOMIC STABILITY: FOOD INSECURITY: WITHIN THE PAST 12 MONTHS, YOU WORRIED THAT YOUR FOOD WOULD RUN OUT BEFORE YOU GOT MONEY TO BUY MORE.: NEVER TRUE

## 2021-09-14 SDOH — HEALTH STABILITY: PHYSICAL HEALTH: ON AVERAGE, HOW MANY MINUTES DO YOU ENGAGE IN EXERCISE AT THIS LEVEL?: 60 MIN

## 2021-09-14 SDOH — ECONOMIC STABILITY: TRANSPORTATION INSECURITY
IN THE PAST 12 MONTHS, HAS LACK OF TRANSPORTATION KEPT YOU FROM MEETINGS, WORK, OR FROM GETTING THINGS NEEDED FOR DAILY LIVING?: NO

## 2021-09-14 SDOH — HEALTH STABILITY: MENTAL HEALTH
STRESS IS WHEN SOMEONE FEELS TENSE, NERVOUS, ANXIOUS, OR CAN'T SLEEP AT NIGHT BECAUSE THEIR MIND IS TROUBLED. HOW STRESSED ARE YOU?: ONLY A LITTLE

## 2021-09-14 ASSESSMENT — SOCIAL DETERMINANTS OF HEALTH (SDOH)
HOW OFTEN DO YOU GET TOGETHER WITH FRIENDS OR RELATIVES?: THREE TIMES A WEEK
DO YOU BELONG TO ANY CLUBS OR ORGANIZATIONS SUCH AS CHURCH GROUPS UNIONS, FRATERNAL OR ATHLETIC GROUPS, OR SCHOOL GROUPS?: YES
HOW OFTEN DO YOU ATTENT MEETINGS OF THE CLUB OR ORGANIZATION YOU BELONG TO?: MORE THAN 4 TIMES PER YEAR
DO YOU BELONG TO ANY CLUBS OR ORGANIZATIONS SUCH AS CHURCH GROUPS UNIONS, FRATERNAL OR ATHLETIC GROUPS, OR SCHOOL GROUPS?: YES
HOW OFTEN DO YOU GET TOGETHER WITH FRIENDS OR RELATIVES?: THREE TIMES A WEEK
HOW OFTEN DO YOU HAVE A DRINK CONTAINING ALCOHOL: MONTHLY OR LESS
HOW HARD IS IT FOR YOU TO PAY FOR THE VERY BASICS LIKE FOOD, HOUSING, MEDICAL CARE, AND HEATING?: NOT HARD AT ALL
IN A TYPICAL WEEK, HOW MANY TIMES DO YOU TALK ON THE PHONE WITH FAMILY, FRIENDS, OR NEIGHBORS?: MORE THAN THREE TIMES A WEEK
HOW OFTEN DO YOU HAVE SIX OR MORE DRINKS ON ONE OCCASION: NEVER
HOW OFTEN DO YOU ATTEND CHURCH OR RELIGIOUS SERVICES?: 1 TO 4 TIMES PER YEAR
IN A TYPICAL WEEK, HOW MANY TIMES DO YOU TALK ON THE PHONE WITH FAMILY, FRIENDS, OR NEIGHBORS?: MORE THAN THREE TIMES A WEEK
WITHIN THE PAST 12 MONTHS, YOU WORRIED THAT YOUR FOOD WOULD RUN OUT BEFORE YOU GOT THE MONEY TO BUY MORE: NEVER TRUE
HOW OFTEN DO YOU ATTEND CHURCH OR RELIGIOUS SERVICES?: 1 TO 4 TIMES PER YEAR
HOW MANY DRINKS CONTAINING ALCOHOL DO YOU HAVE ON A TYPICAL DAY WHEN YOU ARE DRINKING: 1 OR 2
HOW OFTEN DO YOU ATTENT MEETINGS OF THE CLUB OR ORGANIZATION YOU BELONG TO?: MORE THAN 4 TIMES PER YEAR

## 2021-09-14 ASSESSMENT — LIFESTYLE VARIABLES
HOW OFTEN DO YOU HAVE A DRINK CONTAINING ALCOHOL: MONTHLY OR LESS
HOW MANY STANDARD DRINKS CONTAINING ALCOHOL DO YOU HAVE ON A TYPICAL DAY: 1 OR 2
HOW OFTEN DO YOU HAVE SIX OR MORE DRINKS ON ONE OCCASION: NEVER

## 2021-09-16 ENCOUNTER — OFFICE VISIT (OUTPATIENT)
Dept: MEDICAL GROUP | Age: 71
End: 2021-09-16
Payer: MEDICARE

## 2021-09-16 VITALS
HEART RATE: 52 BPM | RESPIRATION RATE: 16 BRPM | DIASTOLIC BLOOD PRESSURE: 68 MMHG | BODY MASS INDEX: 37.15 KG/M2 | SYSTOLIC BLOOD PRESSURE: 120 MMHG | WEIGHT: 314.6 LBS | OXYGEN SATURATION: 96 % | HEIGHT: 77 IN | TEMPERATURE: 98.6 F

## 2021-09-16 DIAGNOSIS — Z00.00 MEDICARE ANNUAL WELLNESS VISIT, INITIAL: ICD-10-CM

## 2021-09-16 DIAGNOSIS — E66.9 OBESITY (BMI 30-39.9): ICD-10-CM

## 2021-09-16 DIAGNOSIS — E78.5 DYSLIPIDEMIA: ICD-10-CM

## 2021-09-16 DIAGNOSIS — E29.1 HYPOGONADISM MALE: ICD-10-CM

## 2021-09-16 DIAGNOSIS — G47.33 OSA TREATED WITH BIPAP: ICD-10-CM

## 2021-09-16 PROCEDURE — G0439 PPPS, SUBSEQ VISIT: HCPCS | Performed by: FAMILY MEDICINE

## 2021-09-16 ASSESSMENT — ACTIVITIES OF DAILY LIVING (ADL): BATHING_REQUIRES_ASSISTANCE: 0

## 2021-09-16 ASSESSMENT — PATIENT HEALTH QUESTIONNAIRE - PHQ9: CLINICAL INTERPRETATION OF PHQ2 SCORE: 0

## 2021-09-16 ASSESSMENT — FIBROSIS 4 INDEX: FIB4 SCORE: 1.34

## 2021-09-16 ASSESSMENT — ENCOUNTER SYMPTOMS: GENERAL WELL-BEING: GOOD

## 2021-09-16 NOTE — PROGRESS NOTES
Chief Complaint   Patient presents with   • Annual Exam         HPI:  Sukhdeep is a 71 y.o. here for Medicare Annual Wellness Visit        Patient Active Problem List    Diagnosis Date Noted   • Obesity (BMI 30-39.9) 09/16/2021   • History of atrial fibrillation 05/20/2020   • S/P total knee arthroplasty, bilateral 04/13/2020   • Class 3 severe obesity in adult (HCC) 03/12/2020   • Dyslipidemia 03/12/2020   • Chronic respiratory failure with hypoxia (HCC)- nocturnal o2 03/12/2020   • Bilateral leg edema 03/12/2020   • CARLOS treated with BiPAP 09/12/2019   • Primary osteoarthritis of right hip- THR tbd 5/2020-n dr barraza 05/23/2019   • Hypogonadism male 05/22/2019   • Need for 23-polyvalent pneumococcal polysaccharide vaccine- tbd fall 2018 09/14/2017   • Prophylactic antibiotic- for dental work due to TKRs 09/14/2017   • Vitamin D deficiency 09/14/2017   • Iron deficiency- due to gastric bypass 01/19/2017   • Benign nodular prostatic hyperplasia with lower urinary tract symptoms- controlled with prn tamsulosin 2-3x/wk 01/19/2017   • Amnesia, global, transient- 2008, resolved; no recurrence; possibly due to due to medication interaction- celebrex and mobic   01/19/2017   • Vitamin B 12 deficiency- due to gastric bypass 06/20/2016   • History of obstructive sleep apnea- resolved with weight loss 06/20/2016   • Gastric bypass status for obesity-2005 dr gallo 11/19/2012   • S/P RF ablation operation for arrhythmia-2008; no recurrence; no meds 12/10/2010       Current Outpatient Medications   Medication Sig Dispense Refill   • sulfamethoxazole-trimethoprim (BACTRIM DS) 800-160 MG tablet Take 1 Tablet by mouth 2 times a day for 30 days. 60 Tablet 0   • tamsulosin (FLOMAX) 0.4 MG capsule TAKE 1 CAP BY MOUTH ONE-HALF HOUR AFTER BREAKFAST. 100 capsule 4   • Cholecalciferol (VITAMIN D3) 2000 UNIT Cap Take 1 Cap by mouth See Admin Instructions. 2 times a week     • Cyanocobalamin (B-12) 1000 MCG Tab CR Take 1 Tab by mouth See  Admin Instructions. 2 times a week     • Ferrous Sulfate Dried (SLOW RELEASE IRON) 45 MG Tab CR Take 1 Tab by mouth See Admin Instructions. 2 times a week       No current facility-administered medications for this visit.        Patient is taking medications as noted in medication list.  Current supplements as per medication list.     Allergies: Penicillins and Meloxicam    Current social contact/activities: [employer], [employer], Grandkids     Is patient current with immunizations? Yes.    He  reports that he has never smoked. He has never used smokeless tobacco. He reports current alcohol use of about 1.2 oz of alcohol per week. He reports that he does not use drugs.  Counseling given: Not Answered        DPA/Advanced directive: Patient has Advanced Directive on file.     ROS:    Gait: Uses no assistive device   Ostomy: No   Other tubes: No   Amputations: No   Chronic oxygen use No   Last eye exam 7/2021   Wears hearing aids: No   : Denies any urinary leakage during the last 6 months      Screening:        Depression Screening    Little interest or pleasure in doing things?  0 - not at all  Feeling down, depressed, or hopeless? 0 - not at all  Patient Health Questionnaire Score: 0    If depressive symptoms identified deferred to follow up visit unless specifically addressed in assessment and plan.    Interpretation of PHQ-9 Total Score   Score Severity   1-4 No Depression   5-9 Mild Depression   10-14 Moderate Depression   15-19 Moderately Severe Depression   20-27 Severe Depression    Screening for Cognitive Impairment    Three Minute Recall (captain, garden, picture)  3/3    Jose clock face with all 12 numbers and set the hands to show 5 past 8.  Yes 5/5  If cognitive concerns identified, deferred for follow up unless specifically addressed in assessment and plan.    Fall Risk Assessment    Has the patient had two or more falls in the last year or any fall with injury in the last year?  No  If fall risk  identified, deferred for follow up unless specifically addressed in assessment and plan.    Safety Assessment    Throw rugs on floor.  No  Handrails on all stairs.  Yes  Good lighting in all hallways.  Yes  Difficulty hearing.  No  Patient counseled about all safety risks that were identified.    Functional Assessment ADLs    Are there any barriers preventing you from cooking for yourself or meeting nutritional needs?  No.    Are there any barriers preventing you from driving safely or obtaining transportation?  No.    Are there any barriers preventing you from using a telephone or calling for help?  No.    Are there any barriers preventing you from shopping?  No.    Are there any barriers preventing you from taking care of your own finances?  No.    Are there any barriers preventing you from managing your medications?  No.    Are there any barriers preventing you from showering, bathing or dressing yourself?  No.    Are you currently engaging in any exercise or physical activity?  Yes.  Gym 3 days/week.   What is your perception of your health?  Good.    Health Maintenance Summary                HEPATITIS C SCREENING Overdue 1950     IMM INFLUENZA Overdue 9/1/2021      Done 9/21/2020 Imm Admin: Influenza Vaccine Adult HD     Patient has more history with this topic...    Annual Wellness Visit Next Due 9/17/2022      Done 9/16/2021      Patient has more history with this topic...    COLORECTAL CANCER SCREENING Next Due 1/25/2024     IMM DTaP/Tdap/Td Vaccine Next Due 3/14/2027      Done 3/14/2017 Imm Admin: Tdap Vaccine          Patient Care Team:  Reinaldo Fenton M.D. as PCP - General (Internal Medicine)    Social History     Tobacco Use   • Smoking status: Never Smoker   • Smokeless tobacco: Never Used   Vaping Use   • Vaping Use: Never used   Substance Use Topics   • Alcohol use: Yes     Alcohol/week: 1.2 oz     Types: 1 Glasses of wine, 1 Cans of beer per week     Comment: one per month   • Drug use: No  "    Family History   Problem Relation Age of Onset   • Cancer Maternal Aunt    • Cancer Maternal Uncle    • Cancer Maternal Grandfather    • Thyroid Mother    • Heart Disease Mother    • Heart Disease Brother    • Sleep Apnea Neg Hx      He  has a past medical history of Arthritis, Atrial fibrillation (HCC) (2007), H/O cardiac radiofrequency ablation (2006), Hip pain, right, Obesity, Obesity (BMI 30-39.9) (2/13/2018), Sleep apnea, and Snoring.   Past Surgical History:   Procedure Laterality Date   • PB TOTAL HIP ARTHROPLASTY Right 5/20/2020    Procedure: ARTHROPLASTY, HIP, TOTAL;  Surgeon: Connor Fonseca M.D.;  Location: SURGERY [facility];  Service: Orthopedics   • KNEE ARTHROPLASTY TOTAL Left 5/5/2015    Procedure: KNEE ARTHROPLASTY TOTAL ;  Surgeon: Mike Michele M.D.;  Location: SURGERY [facility];  Service:    • KNEE ARTHROPLASTY TOTAL  1/6/2015    Performed by Mike Michele M.D. at SURGERY [facility]   • KNEE ARTHROPLASTY TOTAL Bilateral 2015    4 months apart   • INGUINAL HERNIA REPAIR  8/16/2013    Performed by Martin Dillon M.D. at [facility]   • LAPAROTOMY  2006    bowel perforation repair, following gastric  bypass   • KNEE ARTHROSCOPY Left 2006    left knee meniscectomy; dr Michele   • WRIST ORIF Right 2005   • GASTRIC BYPASS LAPAROSCOPIC  2004    dr. gallo   • ARTHROSCOPY, KNEE             Exam:     /68 (BP Location: Right arm, Patient Position: Sitting, BP Cuff Size: Large adult)   Pulse (!) 52   Temp 37 °C (98.6 °F) (Temporal)   Resp 16   Ht 1.943 m (6' 4.5\")   Wt (!) 143 kg (314 lb 9.6 oz)   SpO2 96%  Body mass index is 37.8 kg/m².    Hearing good.    Dentition good  Alert, oriented in no acute distress.  Eye contact is good, speech goal directed, affect calm      Assessment and Plan. The following treatment and monitoring plan is recommended:    1. Medicare annual wellness visit, initial     2. Obesity (BMI 30-39.9)  Patient identified as " having weight management issue.  Appropriate orders and counseling given.   3. CARLOS treated with BiPAP     4. Hypogonadism male     5. Dyslipidemia       Plan    1. Completed    2. Patient has been stable with current management  We will make no changes for now    3. Patient has been stable with current management  We will make no changes for now    4. Patient has been stable with current management  We will make no changes for now    5. Patient has been stable with current management  We will make no changes for now        Services suggested: No services needed at this time  Health Care Screening recommendations as per orders if indicated.  Referrals offered: PT/OT/Nutrition counseling/Behavioral Health/Smoking cessation as per orders if indicated.    Discussion today about general wellness and lifestyle habits:    · Prevent falls and reduce trip hazards; Cautioned about securing or removing rugs.  · Have a working fire alarm and carbon monoxide detector;   · Engage in regular physical activity and social activities       Follow-up: No follow-ups on file.

## 2021-09-28 ENCOUNTER — HOSPITAL ENCOUNTER (OUTPATIENT)
Dept: LAB | Facility: MEDICAL CENTER | Age: 71
End: 2021-09-28
Attending: FAMILY MEDICINE
Payer: MEDICARE

## 2021-09-28 DIAGNOSIS — N40.0 BENIGN PROSTATIC HYPERPLASIA WITHOUT LOWER URINARY TRACT SYMPTOMS: ICD-10-CM

## 2021-09-28 DIAGNOSIS — E78.5 DYSLIPIDEMIA: ICD-10-CM

## 2021-09-28 DIAGNOSIS — Z11.59 NEED FOR HEPATITIS C SCREENING TEST: ICD-10-CM

## 2021-09-28 LAB
ALBUMIN SERPL BCP-MCNC: 4.1 G/DL (ref 3.2–4.9)
ALBUMIN/GLOB SERPL: 1.9 G/DL
ALP SERPL-CCNC: 80 U/L (ref 30–99)
ALT SERPL-CCNC: 14 U/L (ref 2–50)
ANION GAP SERPL CALC-SCNC: 9 MMOL/L (ref 7–16)
AST SERPL-CCNC: 17 U/L (ref 12–45)
BILIRUB SERPL-MCNC: 0.5 MG/DL (ref 0.1–1.5)
BUN SERPL-MCNC: 21 MG/DL (ref 8–22)
CALCIUM SERPL-MCNC: 9.1 MG/DL (ref 8.5–10.5)
CHLORIDE SERPL-SCNC: 105 MMOL/L (ref 96–112)
CHOLEST SERPL-MCNC: 172 MG/DL (ref 100–199)
CO2 SERPL-SCNC: 24 MMOL/L (ref 20–33)
CREAT SERPL-MCNC: 0.84 MG/DL (ref 0.5–1.4)
ERYTHROCYTE [DISTWIDTH] IN BLOOD BY AUTOMATED COUNT: 43.5 FL (ref 35.9–50)
FASTING STATUS PATIENT QL REPORTED: NORMAL
GLOBULIN SER CALC-MCNC: 2.2 G/DL (ref 1.9–3.5)
GLUCOSE SERPL-MCNC: 93 MG/DL (ref 65–99)
HCT VFR BLD AUTO: 43.7 % (ref 42–52)
HCV AB SER QL: NORMAL
HDLC SERPL-MCNC: 66 MG/DL
HGB BLD-MCNC: 14.1 G/DL (ref 14–18)
LDLC SERPL CALC-MCNC: 94 MG/DL
MCH RBC QN AUTO: 29.2 PG (ref 27–33)
MCHC RBC AUTO-ENTMCNC: 32.3 G/DL (ref 33.7–35.3)
MCV RBC AUTO: 90.5 FL (ref 81.4–97.8)
PLATELET # BLD AUTO: 238 K/UL (ref 164–446)
PMV BLD AUTO: 10.4 FL (ref 9–12.9)
POTASSIUM SERPL-SCNC: 4.1 MMOL/L (ref 3.6–5.5)
PROT SERPL-MCNC: 6.3 G/DL (ref 6–8.2)
PSA SERPL-MCNC: 0.23 NG/ML (ref 0–4)
RBC # BLD AUTO: 4.83 M/UL (ref 4.7–6.1)
SODIUM SERPL-SCNC: 138 MMOL/L (ref 135–145)
TRIGL SERPL-MCNC: 62 MG/DL (ref 0–149)
WBC # BLD AUTO: 5.4 K/UL (ref 4.8–10.8)

## 2021-09-28 PROCEDURE — 36415 COLL VENOUS BLD VENIPUNCTURE: CPT

## 2021-09-28 PROCEDURE — 80053 COMPREHEN METABOLIC PANEL: CPT

## 2021-09-28 PROCEDURE — 80061 LIPID PANEL: CPT

## 2021-09-28 PROCEDURE — 84153 ASSAY OF PSA TOTAL: CPT

## 2021-09-28 PROCEDURE — 86803 HEPATITIS C AB TEST: CPT

## 2021-09-28 PROCEDURE — 85027 COMPLETE CBC AUTOMATED: CPT

## 2021-10-01 ENCOUNTER — PATIENT MESSAGE (OUTPATIENT)
Dept: SLEEP MEDICINE | Facility: MEDICAL CENTER | Age: 71
End: 2021-10-01

## 2021-10-01 NOTE — TELEPHONE ENCOUNTER
From: Jhony Rodriguez  To: Physician Marla Ochoa  Sent: 10/1/2021 9:01 AM PDT  Subject: Mao Bipap recall    This recall has prompted me to discuss continuation of use of the current equipment, and what alternatives are available at this time. I have not been using ozone , primary  is Dove (soapy water), which should not affect the filter material used. My concern is whether there will be a replacement in the near future, and or do you recommend stopping use. Thank you for your reply in advance. (Sukhdeep) Jhony Rodriguez

## 2022-01-14 DIAGNOSIS — N41.0 ACUTE PROSTATITIS: ICD-10-CM

## 2022-01-14 DIAGNOSIS — N41.0 ACUTE PROSTATITIS WITHOUT HEMATURIA: ICD-10-CM

## 2022-01-14 RX ORDER — SULFAMETHOXAZOLE AND TRIMETHOPRIM 800; 160 MG/1; MG/1
1 TABLET ORAL 2 TIMES DAILY
Qty: 60 TABLET | Refills: 0 | Status: SHIPPED | OUTPATIENT
Start: 2022-01-14 | End: 2022-02-13

## 2022-01-14 RX ORDER — SULFAMETHOXAZOLE AND TRIMETHOPRIM 800; 160 MG/1; MG/1
1 TABLET ORAL 2 TIMES DAILY
Qty: 60 TABLET | Refills: 2 | Status: SHIPPED | OUTPATIENT
Start: 2022-01-14 | End: 2023-01-09 | Stop reason: SDUPTHER

## 2022-01-19 NOTE — PROGRESS NOTES
Left vm- birthday call  
Patient called in to ask about his RX for Tramadol. I advised it is on the Formulary and is a $4 co pay for a 30 day supply. He stated that [facility] told him it was not covered. I contacted [facility] on behalf of the patient and they stated that the Tramadol was written for 10 days and only 7 days would be covered. I requested if they can change the amount they declined to do so. I called Raymundo Jennings's office [phone] [facility] and left a message with his medical assistant asking if the provider will change the prescription so the insurance will cover it. I notified patient of all this and let him know I will check back tomorrow afternoon and see if it was addressed. He had no further questions.   
[FreeTextEntry1] : Pt is a 45 yo F with PMHx of anxiety, who presents with c/o restless leg syndrome. She was following with Dr. Nowak, had extensive workup including EEG and MRI. Describes restless symptoms at night, improves with walking and movement. She has been on pramipexole 0.5mg at night for about 2 years, helps significantly. Unsure if they ever checked her iron levels; no known h/o anemia. Continues to have her menstruation, denies menorrhagia.

## 2022-06-07 RX ORDER — TAMSULOSIN HYDROCHLORIDE 0.4 MG/1
0.4 CAPSULE ORAL
Qty: 90 CAPSULE | Refills: 5 | Status: SHIPPED | OUTPATIENT
Start: 2022-06-07 | End: 2023-03-22 | Stop reason: SDUPTHER

## 2022-08-15 ENCOUNTER — APPOINTMENT (OUTPATIENT)
Dept: SLEEP MEDICINE | Facility: MEDICAL CENTER | Age: 72
End: 2022-08-15
Payer: MEDICARE

## 2022-08-16 ENCOUNTER — TELEMEDICINE (OUTPATIENT)
Dept: SLEEP MEDICINE | Facility: MEDICAL CENTER | Age: 72
End: 2022-08-16
Payer: MEDICARE

## 2022-08-16 VITALS — BODY MASS INDEX: 36.37 KG/M2 | HEIGHT: 77 IN | WEIGHT: 308 LBS

## 2022-08-16 DIAGNOSIS — G47.33 OSA (OBSTRUCTIVE SLEEP APNEA): ICD-10-CM

## 2022-08-16 PROCEDURE — 99213 OFFICE O/P EST LOW 20 MIN: CPT | Mod: 95 | Performed by: FAMILY MEDICINE

## 2022-08-16 ASSESSMENT — FIBROSIS 4 INDEX: FIB4 SCORE: 1.336306209562121923

## 2022-08-16 NOTE — PROGRESS NOTES
Telemedicine Visit: Established Patient   This evaluation was conducted via Zoom using secure and encrypted videoconferencing technology. The patient was in their home in the [facility].    The patient's identity was confirmed and verbal consent was obtained for this virtual visit.     [facility] Follow Up Note     Date: 8/16/2022 / Time: 10:27 AM    Patient ID:   Name:             Jhony Rodriguez   YOB: 1950  Age:                 72 y.o.  male   MRN:               [id number]      Thank you for requesting a sleep medicine consultation on Jhony Rodriguez at the sleep center. He presents today with the chief complaints of CARLOS follow up.     HISTORY OF PRESENT ILLNESS:       Pt is currently on BiPAP 16/12 cm with FFM. He goes to sleep around 9:30-10 pm and wakes up around 5:30-6 am. Overall,  He does finds his sleep refreshing.He does not take regular naps. He denies any symptoms of RLS, narcolepsy or any symptoms to suggest parasomnias such as nightmares, sleep walking or acting out of dreams.      He is using CPAP most days of the week. Pt reports 4 hrs of average nightly use of CPAP. Pt denies snoring, gasping,choking.Pt also denies significant mask leak that is interfering with sleep. The 30 day compliance was downloaded which shows INadequate compliance with more that 4 hr usage about 66%. The AHI is has improved to 2.7/hr. The mask leak is normal. The symptoms of excessive daytime, snoring and gasping has improved with the therapy. He is unable to use consistently due to mask being old and leaking.     SLEEP HISTORY   Split night study on 1/6/20 which showed  Severe obstructive sleep apnea with AHI of 50.4/hr and O2 nikolay 77 %. Due to severity of the disease he met the split study protocol. The titration started with CPAP 7 cm and the best tolerated was CPAP 15/11 cm. The AHI improved to 8.9/hr with improved O2 nikolay of 72% and average O2 saturation of 90 %. However he had  sleep related hypoxia.     He was initially diagnosed in 2012 and had titration study on 12/14/02. Based on the study, the best tolerated pressure was BiPAP 19/15 cm, the AHi improved to 0/hr and and O2 nikolay was 88%.       REVIEW OF SYSTEMS:       Constitutional: Denies fevers, Denies weight changes  Eyes: Denies changes in vision, no eye pain  Ears/Nose/Throat/Mouth: Denies nasal congestion or sore throat   Cardiovascular: Denies chest pain or palpitations   Respiratory: Denies shortness of breath , Denies cough  Gastrointestinal/Hepatic: Denies abdominal pain, nausea, vomiting, diarrhea, constipation or GI bleeding   Genitourinary: Deniesdysuria or frequency  Musculoskeletal/Rheum: Denies  joint pain and swelling   Skin/Breast: Denies rash,   Neurological: Denies headache, confusion, memory loss or focal weakness/parasthesias  Psychiatric: denies mood disorder   Sleep: denies snoring    Comprehensive review of systems form is reviewed with the patient and is attached in the EMR.     PMH:  has a past medical history of Arthritis, Atrial fibrillation (HCC) (2007), H/O cardiac radiofrequency ablation (2006), Hip pain, right, Obesity, Obesity (BMI 30-39.9) (2/13/2018), Sleep apnea, and Snoring.  MEDS:   Current Outpatient Medications:     tamsulosin (FLOMAX) 0.4 MG capsule, TAKE 1 CAP BY MOUTH ONE-HALF HOUR AFTER BREAKFAST., Disp: 90 Capsule, Rfl: 5    sulfamethoxazole-trimethoprim (BACTRIM DS) 800-160 MG tablet, Take 1 Tablet by mouth 2 times a day., Disp: 60 Tablet, Rfl: 2    Cholecalciferol (VITAMIN D3) 2000 UNIT Cap, Take 1 Cap by mouth See Admin Instructions. 2 times a week, Disp: , Rfl:     Cyanocobalamin (B-12) 1000 MCG Tab CR, Take 1 Tab by mouth See Admin Instructions. 2 times a week, Disp: , Rfl:     Ferrous Sulfate Dried 45 MG Tab CR, Take 1 Tab by mouth See Admin Instructions. 2 times a week (Patient not taking: No sig reported), Disp: , Rfl:   ALLERGIES:   Allergies   Allergen Reactions    Penicillins  "Rash and Swelling     Rash  Tolerated cefazolin 5/5/15    Meloxicam      alterred mentation     SURGHX:   Past Surgical History:   Procedure Laterality Date    VT TOTAL HIP ARTHROPLASTY Right 5/20/2020    Procedure: ARTHROPLASTY, HIP, TOTAL;  Surgeon: Connor Fonseca M.D.;  Location: SURGERY [facility];  Service: Orthopedics    KNEE ARTHROPLASTY TOTAL Left 5/5/2015    Procedure: KNEE ARTHROPLASTY TOTAL ;  Surgeon: Mike Michele M.D.;  Location: SURGERY [facility];  Service:     KNEE ARTHROPLASTY TOTAL  1/6/2015    Performed by Mike Michele M.D. at SURGERY [facility]    KNEE ARTHROPLASTY TOTAL Bilateral 2015    4 months apart    INGUINAL HERNIA REPAIR  8/16/2013    Performed by Martin Dillon M.D. at SURGERY [facility]    LAPAROTOMY  2006    bowel perforation repair, following gastric  bypass    KNEE ARTHROSCOPY Left 2006    left knee meniscectomy; dr Michele    ORIF, WRIST Right 2005    GASTRIC BYPASS LAPAROSCOPIC  2004    dr. gallo    ARTHROSCOPY, KNEE       SOCHX:  reports that he has never smoked. He has never used smokeless tobacco. He reports current alcohol use of about 1.2 oz per week. He reports that he does not use drugs..  FH:   Family History   Problem Relation Age of Onset    Cancer Maternal Aunt     Cancer Maternal Uncle     Cancer Maternal Grandfather     Thyroid Mother     Heart Disease Mother     Heart Disease Brother     Sleep Apnea Neg Hx          Physical Exam:  Vitals/ General Appearance:   Weight/BMI: Body mass index is 36.76 kg/m².  Ht 1.949 m (6' 4.75\")   Wt (!) 140 kg (308 lb)   Vitals:    08/16/22 1019   Weight: (!) 140 kg (308 lb)   Height: 1.949 m (6' 4.75\")       Pt. is alert and oriented to time, place and person. Cooperative and in no apparent distress.       Constitutional: Alert, no distress, well-groomed.  Skin: No rashes in visible areas.  Eye: Round. Conjunctiva clear, lids normal. No icterus.   ENMT: Lips pink without lesions, good dentition, " moist mucous membranes. Phonation normal.  Neck: No masses, no thyromegaly. Moves freely without pain.  CV: Pulse as reported by patient  Respiratory: Unlabored respiratory effort, no cough or audible wheeze  Psych: Alert and oriented x3, normal affect and mood.     ASSESSMENT AND PLAN     1. Sleep Apnea .     The pathophysiology of sleep anea and the increased risk of cardiovascular morbidity from untreated sleep apnea is discussed in detail with the patient.    He is urged to avoid supine sleep, weight gain and alcoholic beverages since all of these can worsen sleep apnea. He is cautioned against drowsy driving. If He feels sleepy while driving, He must pull over for a break/nap, rather than persist on the road, in the interest of He own safety and that of others on the road.     Plan   - Continue BiPAP at 16/12 cm with FFM   - SUPPLIES ARE REFILLED    - compliance download was reviewed and discussed with the pt   - compliance was reinforced     2. Regarding treatment of other past medical problems and general health maintenance,  He is urged to follow up with PCP.

## 2022-09-09 ENCOUNTER — TELEPHONE (OUTPATIENT)
Dept: MEDICAL GROUP | Age: 72
End: 2022-09-09
Payer: MEDICARE

## 2022-09-09 DIAGNOSIS — Z00.00 HEALTHCARE MAINTENANCE: ICD-10-CM

## 2022-09-09 DIAGNOSIS — E78.5 DYSLIPIDEMIA: ICD-10-CM

## 2022-09-09 DIAGNOSIS — E55.9 VITAMIN D DEFICIENCY: ICD-10-CM

## 2022-09-09 DIAGNOSIS — N40.0 BENIGN PROSTATIC HYPERPLASIA WITHOUT LOWER URINARY TRACT SYMPTOMS: ICD-10-CM

## 2022-09-09 DIAGNOSIS — E29.1 HYPOGONADISM MALE: ICD-10-CM

## 2022-09-09 DIAGNOSIS — R73.01 IFG (IMPAIRED FASTING GLUCOSE): ICD-10-CM

## 2022-09-09 NOTE — TELEPHONE ENCOUNTER
VOICEMAIL  1. Caller Name: Jhony Rodriguez                        Call Back Number: [phone] (home) [phone] (work)      2. Message: Patient requesting blood work before next appointment. Please advise.     3. Patient approves office to leave a detailed voicemail/MyChart message: yes

## 2022-09-16 ENCOUNTER — HOSPITAL ENCOUNTER (OUTPATIENT)
Dept: LAB | Facility: MEDICAL CENTER | Age: 72
End: 2022-09-16
Attending: INTERNAL MEDICINE
Payer: MEDICARE

## 2022-09-16 DIAGNOSIS — E55.9 VITAMIN D DEFICIENCY: ICD-10-CM

## 2022-09-16 DIAGNOSIS — N40.0 BENIGN PROSTATIC HYPERPLASIA WITHOUT LOWER URINARY TRACT SYMPTOMS: ICD-10-CM

## 2022-09-16 DIAGNOSIS — Z00.00 HEALTHCARE MAINTENANCE: ICD-10-CM

## 2022-09-16 DIAGNOSIS — E78.5 DYSLIPIDEMIA: ICD-10-CM

## 2022-09-16 DIAGNOSIS — E29.1 HYPOGONADISM MALE: ICD-10-CM

## 2022-09-16 DIAGNOSIS — R73.01 IFG (IMPAIRED FASTING GLUCOSE): ICD-10-CM

## 2022-09-16 LAB
25(OH)D3 SERPL-MCNC: 20 NG/ML (ref 30–100)
ALBUMIN SERPL BCP-MCNC: 3.7 G/DL (ref 3.2–4.9)
ALBUMIN/GLOB SERPL: 1.6 G/DL
ALP SERPL-CCNC: 79 U/L (ref 30–99)
ALT SERPL-CCNC: 15 U/L (ref 2–50)
ANION GAP SERPL CALC-SCNC: 10 MMOL/L (ref 7–16)
AST SERPL-CCNC: 16 U/L (ref 12–45)
BASOPHILS # BLD AUTO: 1.4 % (ref 0–1.8)
BASOPHILS # BLD: 0.07 K/UL (ref 0–0.12)
BILIRUB SERPL-MCNC: 0.5 MG/DL (ref 0.1–1.5)
BUN SERPL-MCNC: 17 MG/DL (ref 8–22)
CALCIUM SERPL-MCNC: 8.9 MG/DL (ref 8.5–10.5)
CHLORIDE SERPL-SCNC: 107 MMOL/L (ref 96–112)
CHOLEST SERPL-MCNC: 165 MG/DL (ref 100–199)
CO2 SERPL-SCNC: 24 MMOL/L (ref 20–33)
CREAT SERPL-MCNC: 0.76 MG/DL (ref 0.5–1.4)
EOSINOPHIL # BLD AUTO: 0.26 K/UL (ref 0–0.51)
EOSINOPHIL NFR BLD: 5.1 % (ref 0–6.9)
ERYTHROCYTE [DISTWIDTH] IN BLOOD BY AUTOMATED COUNT: 42.8 FL (ref 35.9–50)
EST. AVERAGE GLUCOSE BLD GHB EST-MCNC: 103 MG/DL
FASTING STATUS PATIENT QL REPORTED: NORMAL
GFR SERPLBLD CREATININE-BSD FMLA CKD-EPI: 97 ML/MIN/1.73 M 2
GLOBULIN SER CALC-MCNC: 2.3 G/DL (ref 1.9–3.5)
GLUCOSE SERPL-MCNC: 85 MG/DL (ref 65–99)
HBA1C MFR BLD: 5.2 % (ref 4–5.6)
HCT VFR BLD AUTO: 44.7 % (ref 42–52)
HDLC SERPL-MCNC: 64 MG/DL
HGB BLD-MCNC: 14.6 G/DL (ref 14–18)
IMM GRANULOCYTES # BLD AUTO: 0.01 K/UL (ref 0–0.11)
IMM GRANULOCYTES NFR BLD AUTO: 0.2 % (ref 0–0.9)
LDLC SERPL CALC-MCNC: 90 MG/DL
LYMPHOCYTES # BLD AUTO: 0.79 K/UL (ref 1–4.8)
LYMPHOCYTES NFR BLD: 15.6 % (ref 22–41)
MCH RBC QN AUTO: 29 PG (ref 27–33)
MCHC RBC AUTO-ENTMCNC: 32.7 G/DL (ref 33.7–35.3)
MCV RBC AUTO: 88.9 FL (ref 81.4–97.8)
MONOCYTES # BLD AUTO: 0.4 K/UL (ref 0–0.85)
MONOCYTES NFR BLD AUTO: 7.9 % (ref 0–13.4)
NEUTROPHILS # BLD AUTO: 3.55 K/UL (ref 1.82–7.42)
NEUTROPHILS NFR BLD: 69.8 % (ref 44–72)
NRBC # BLD AUTO: 0 K/UL
NRBC BLD-RTO: 0 /100 WBC
PLATELET # BLD AUTO: 244 K/UL (ref 164–446)
PMV BLD AUTO: 10.7 FL (ref 9–12.9)
POTASSIUM SERPL-SCNC: 4 MMOL/L (ref 3.6–5.5)
PROT SERPL-MCNC: 6 G/DL (ref 6–8.2)
PSA SERPL-MCNC: 0.33 NG/ML (ref 0–4)
RBC # BLD AUTO: 5.03 M/UL (ref 4.7–6.1)
SODIUM SERPL-SCNC: 141 MMOL/L (ref 135–145)
TRIGL SERPL-MCNC: 57 MG/DL (ref 0–149)
TSH SERPL DL<=0.005 MIU/L-ACNC: 4.38 UIU/ML (ref 0.38–5.33)
WBC # BLD AUTO: 5.1 K/UL (ref 4.8–10.8)

## 2022-09-16 PROCEDURE — 80053 COMPREHEN METABOLIC PANEL: CPT

## 2022-09-16 PROCEDURE — 82306 VITAMIN D 25 HYDROXY: CPT

## 2022-09-16 PROCEDURE — 80061 LIPID PANEL: CPT

## 2022-09-16 PROCEDURE — 84270 ASSAY OF SEX HORMONE GLOBUL: CPT

## 2022-09-16 PROCEDURE — 83036 HEMOGLOBIN GLYCOSYLATED A1C: CPT

## 2022-09-16 PROCEDURE — 84153 ASSAY OF PSA TOTAL: CPT

## 2022-09-16 PROCEDURE — 36415 COLL VENOUS BLD VENIPUNCTURE: CPT

## 2022-09-16 PROCEDURE — 85025 COMPLETE CBC W/AUTO DIFF WBC: CPT

## 2022-09-16 PROCEDURE — 84403 ASSAY OF TOTAL TESTOSTERONE: CPT

## 2022-09-16 PROCEDURE — 84402 ASSAY OF FREE TESTOSTERONE: CPT

## 2022-09-16 PROCEDURE — 84443 ASSAY THYROID STIM HORMONE: CPT

## 2022-09-20 LAB
SHBG SERPL-SCNC: 102 NMOL/L (ref 19–76)
TESTOST FREE MFR SERPL: 0.8 % (ref 1.6–2.9)
TESTOST FREE SERPL-MCNC: 37 PG/ML (ref 47–244)
TESTOST SERPL-MCNC: 442 NG/DL (ref 300–720)

## 2022-09-21 ENCOUNTER — OFFICE VISIT (OUTPATIENT)
Dept: MEDICAL GROUP | Age: 72
End: 2022-09-21
Payer: MEDICARE

## 2022-09-21 VITALS
RESPIRATION RATE: 16 BRPM | HEART RATE: 53 BPM | TEMPERATURE: 98.1 F | BODY MASS INDEX: 39.17 KG/M2 | DIASTOLIC BLOOD PRESSURE: 64 MMHG | OXYGEN SATURATION: 96 % | SYSTOLIC BLOOD PRESSURE: 116 MMHG | HEIGHT: 75 IN | WEIGHT: 315 LBS

## 2022-09-21 DIAGNOSIS — N40.1 BENIGN NODULAR PROSTATIC HYPERPLASIA WITH LOWER URINARY TRACT SYMPTOMS: ICD-10-CM

## 2022-09-21 DIAGNOSIS — J96.11 CHRONIC RESPIRATORY FAILURE WITH HYPOXIA (HCC): ICD-10-CM

## 2022-09-21 DIAGNOSIS — G47.33 OSA TREATED WITH BIPAP: ICD-10-CM

## 2022-09-21 DIAGNOSIS — E66.01 CLASS 3 SEVERE OBESITY IN ADULT, UNSPECIFIED BMI, UNSPECIFIED OBESITY TYPE, UNSPECIFIED WHETHER SERIOUS COMORBIDITY PRESENT (HCC): ICD-10-CM

## 2022-09-21 DIAGNOSIS — E29.1 HYPOGONADISM MALE: ICD-10-CM

## 2022-09-21 DIAGNOSIS — E55.9 VITAMIN D DEFICIENCY: ICD-10-CM

## 2022-09-21 DIAGNOSIS — E66.9 OBESITY (BMI 30-39.9): ICD-10-CM

## 2022-09-21 DIAGNOSIS — Z98.84 GASTRIC BYPASS STATUS FOR OBESITY: ICD-10-CM

## 2022-09-21 DIAGNOSIS — E53.8 VITAMIN B 12 DEFICIENCY: ICD-10-CM

## 2022-09-21 DIAGNOSIS — E61.1 IRON DEFICIENCY: ICD-10-CM

## 2022-09-21 DIAGNOSIS — R01.1 SYSTOLIC MURMUR: ICD-10-CM

## 2022-09-21 DIAGNOSIS — E78.5 DYSLIPIDEMIA: ICD-10-CM

## 2022-09-21 PROBLEM — Z86.79 HISTORY OF ATRIAL FIBRILLATION: Status: RESOLVED | Noted: 2020-05-20 | Resolved: 2022-09-21

## 2022-09-21 PROBLEM — Z79.2 PROPHYLACTIC ANTIBIOTIC: Status: RESOLVED | Noted: 2017-09-14 | Resolved: 2022-09-21

## 2022-09-21 PROBLEM — M16.11 PRIMARY OSTEOARTHRITIS OF RIGHT HIP: Status: RESOLVED | Noted: 2019-05-23 | Resolved: 2022-09-21

## 2022-09-21 PROBLEM — G45.4 TRANSIENT GLOBAL AMNESIA: Status: RESOLVED | Noted: 2017-01-19 | Resolved: 2022-09-21

## 2022-09-21 PROBLEM — Z96.653 S/P TOTAL KNEE ARTHROPLASTY, BILATERAL: Status: RESOLVED | Noted: 2020-04-13 | Resolved: 2022-09-21

## 2022-09-21 PROBLEM — R60.0 BILATERAL LEG EDEMA: Status: RESOLVED | Noted: 2020-03-12 | Resolved: 2022-09-21

## 2022-09-21 PROCEDURE — 99214 OFFICE O/P EST MOD 30 MIN: CPT | Performed by: INTERNAL MEDICINE

## 2022-09-21 ASSESSMENT — ENCOUNTER SYMPTOMS
NEUROLOGICAL NEGATIVE: 1
PSYCHIATRIC NEGATIVE: 1
CONSTITUTIONAL NEGATIVE: 1
RESPIRATORY NEGATIVE: 1
CARDIOVASCULAR NEGATIVE: 1
GASTROINTESTINAL NEGATIVE: 1
MUSCULOSKELETAL NEGATIVE: 1
EYES NEGATIVE: 1

## 2022-09-21 ASSESSMENT — FIBROSIS 4 INDEX: FIB4 SCORE: 1.19

## 2022-09-21 NOTE — PROGRESS NOTES
Subjective     Jhony Rodriguez is a 72 y.o. male who presents with Annual Exam (Lab review )  The patient is here for followup of chronic medical problems listed below. The patient is compliant with medications and having no side effects from them. Denies chest pain, abdominal pain, dyspnea, myalgias, or cough.   Patient Active Problem List    Diagnosis Date Noted    Obesity (BMI 30-39.9) 09/16/2021    Class 3 severe obesity in adult (HCC) 03/12/2020    Dyslipidemia 03/12/2020    Chronic respiratory failure with hypoxia (HCC)- nocturnal o2 03/12/2020    CARLOS treated with BiPAP 09/12/2019    Hypogonadism male 05/22/2019    Vitamin D deficiency 09/14/2017    Iron deficiency- due to gastric bypass 01/19/2017    Benign nodular prostatic hyperplasia with lower urinary tract symptoms- controlled with prn tamsulosin 2-3x/wk 01/19/2017    Vitamin B 12 deficiency- due to gastric bypass 06/20/2016    Gastric bypass status for obesity-2005 dr gallo 11/19/2012    S/P RF ablation operation for arrhythmia-2008; no recurrence; no meds 12/10/2010     Outpatient Medications Prior to Visit   Medication Sig Dispense Refill    tamsulosin (FLOMAX) 0.4 MG capsule TAKE 1 CAP BY MOUTH ONE-HALF HOUR AFTER BREAKFAST. 90 Capsule 5    sulfamethoxazole-trimethoprim (BACTRIM DS) 800-160 MG tablet Take 1 Tablet by mouth 2 times a day. 60 Tablet 2    Cholecalciferol (VITAMIN D3) 2000 UNIT Cap Take 1 Cap by mouth See Admin Instructions. 2 times a week      Cyanocobalamin (B-12) 1000 MCG Tab CR Take 1 Tab by mouth See Admin Instructions. 2 times a week      Ferrous Sulfate Dried 45 MG Tab CR Take 1 Tab by mouth See Admin Instructions. 2 times a week (Patient not taking: No sig reported)       No facility-administered medications prior to visit.   '  Allergies   Allergen Reactions    Penicillins Rash and Swelling     Rash  Tolerated cefazolin 5/5/15    Meloxicam      [facility] Outpatient Visit on 09/16/2022   Component  Date Value    TSH 09/16/2022 4.380     Sodium 09/16/2022 141     Potassium 09/16/2022 4.0     Chloride 09/16/2022 107     Co2 09/16/2022 24     Anion Gap 09/16/2022 10.0     Glucose 09/16/2022 85     Bun 09/16/2022 17     Creatinine 09/16/2022 0.76     Calcium 09/16/2022 8.9     AST(SGOT) 09/16/2022 16     ALT(SGPT) 09/16/2022 15     Alkaline Phosphatase 09/16/2022 79     Total Bilirubin 09/16/2022 0.5     Albumin 09/16/2022 3.7     Total Protein 09/16/2022 6.0     Globulin 09/16/2022 2.3     A-G Ratio 09/16/2022 1.6     Cholesterol,Tot 09/16/2022 165     Triglycerides 09/16/2022 57     HDL 09/16/2022 64     LDL 09/16/2022 90     WBC 09/16/2022 5.1     RBC 09/16/2022 5.03     Hemoglobin 09/16/2022 14.6     Hematocrit 09/16/2022 44.7     MCV 09/16/2022 88.9     MCH 09/16/2022 29.0     MCHC 09/16/2022 32.7 (A)    RDW 09/16/2022 42.8     Platelet Count 09/16/2022 244     MPV 09/16/2022 10.7     Neutrophils-Polys 09/16/2022 69.80     Lymphocytes 09/16/2022 15.60 (A)    Monocytes 09/16/2022 7.90     Eosinophils 09/16/2022 5.10     Basophils 09/16/2022 1.40     Immature Granulocytes 09/16/2022 0.20     Nucleated RBC 09/16/2022 0.00     Neutrophils (Absolute) 09/16/2022 3.55     Lymphs (Absolute) 09/16/2022 0.79 (A)    Monos (Absolute) 09/16/2022 0.40     Eos (Absolute) 09/16/2022 0.26     Baso (Absolute) 09/16/2022 0.07     Immature Granulocytes (a* 09/16/2022 0.01     NRBC (Absolute) 09/16/2022 0.00     Glycohemoglobin 09/16/2022 5.2     Est Avg Glucose 09/16/2022 103     25-Hydroxy   Vitamin D 25 09/16/2022 20 (A)    Prostatic Specific Antig* 09/16/2022 0.33     Testosterone,Total 09/16/2022 442     Sex Hormone Bind Globulin 09/16/2022 102 (A)    Free Testosterone 09/16/2022 37 (A)    Testosterone % Free 09/16/2022 0.8 (A)    Fasting Status 09/16/2022 Fasting     GFR (CKD-EPI) 09/16/2022 97       .[email or web address]'  Lab Results   Component Value Date/Time    HBA1C 5.2 09/16/2022 06:09 AM    HBA1C 5.3 09/25/2019 08:38 AM      Lab Results   Component Value Date/Time    SODIUM 141 09/16/2022 06:09 AM    POTASSIUM 4.0 09/16/2022 06:09 AM    CHLORIDE 107 09/16/2022 06:09 AM    CO2 24 09/16/2022 06:09 AM    GLUCOSE 85 09/16/2022 06:09 AM    BUN 17 09/16/2022 06:09 AM    CREATININE 0.76 09/16/2022 06:09 AM    CREATININE 0.77 12/06/2010 08:03 AM    BUNCREATRAT 31 (H) 06/21/2016 09:57 AM    BUNCREATRAT 36 (H) 12/06/2010 08:03 AM    GLOMRATE >59 12/06/2010 08:03 AM    ALKPHOSPHAT 79 09/16/2022 06:09 AM    ASTSGOT 16 09/16/2022 06:09 AM    ALTSGPT 15 09/16/2022 06:09 AM    TBILIRUBIN 0.5 09/16/2022 06:09 AM     Lab Results   Component Value Date/Time    INR 1.07 02/05/2018 01:38 PM    INR 1.03 10/21/2013 12:12 PM    INR 1.51 (H) 06/12/2008 06:30 AM     Lab Results   Component Value Date/Time    CHOLSTRLTOT 165 09/16/2022 06:09 AM    LDL 90 09/16/2022 06:09 AM    HDL 64 09/16/2022 06:09 AM    TRIGLYCERIDE 57 09/16/2022 06:09 AM       Lab Results   Component Value Date/Time    TESTOSTERONE 442 09/16/2022 06:09 AM     Lab Results   Component Value Date/Time    TSH 2.700 06/21/2016 09:57 AM    TSH 3.190 07/11/2013 07:33 AM     Lab Results   Component Value Date/Time    FREET4 0.81 07/07/2014 09:54 AM    FREET4 0.89 10/21/2013 12:12 PM     No results found for: URICACID  No components found for: VITB12  Lab Results   Component Value Date/Time    25HYDROXY 20 (L) 09/16/2022 06:09 AM    25HYDROXY 24 (L) 04/06/2020 08:22 AM   '            HPI    Review of Systems   Constitutional: Negative.    HENT: Negative.     Eyes: Negative.    Respiratory: Negative.     Cardiovascular: Negative.    Gastrointestinal: Negative.    Genitourinary: Negative.    Musculoskeletal: Negative.    Skin: Negative.    Neurological: Negative.    Endo/Heme/Allergies: Negative.    Psychiatric/Behavioral: Negative.            Since last visit patient doing well with no new complaints.  No new problems.         Objective     /64 (BP Location: Right arm, Patient Position:  "Sitting, BP Cuff Size: Adult)   Pulse (!) 53   Temp 36.7 °C (98.1 °F) (Temporal)   Resp 16   Ht 1.905 m (6' 3\")   Wt (!) 143 kg (315 lb)   SpO2 96%   BMI 39.37 kg/m²      Physical Exam  Constitutional:       General: He is not in acute distress.     Appearance: He is well-developed. He is not diaphoretic.   HENT:      Head: Normocephalic and atraumatic.      Right Ear: External ear normal.      Left Ear: External ear normal.      Nose: Nose normal.      Mouth/Throat:      Pharynx: No oropharyngeal exudate.   Eyes:      General: No scleral icterus.        Right eye: No discharge.         Left eye: No discharge.      Conjunctiva/sclera: Conjunctivae normal.      Pupils: Pupils are equal, round, and reactive to light.   Neck:      Thyroid: No thyromegaly.      Vascular: No JVD.      Trachea: No tracheal deviation.   Cardiovascular:      Rate and Rhythm: Normal rate and regular rhythm.      Heart sounds: Murmur heard.     No friction rub. No gallop.      Comments: Grade 1/6 systolic murmur macarena-shaped at the right second intercostal space  Pulmonary:      Effort: Pulmonary effort is normal. No respiratory distress.      Breath sounds: Normal breath sounds. No stridor. No wheezing or rales.   Chest:      Chest wall: No tenderness.   Abdominal:      General: Bowel sounds are normal. There is no distension.      Palpations: Abdomen is soft. There is no mass.      Tenderness: There is no abdominal tenderness. There is no guarding or rebound.   Musculoskeletal:         General: No tenderness. Normal range of motion.      Cervical back: Normal range of motion and neck supple.   Lymphadenopathy:      Cervical: No cervical adenopathy.   Skin:     General: Skin is warm and dry.      Coloration: Skin is not pale.      Findings: Lesion present. No erythema or rash.   Neurological:      Mental Status: He is alert and oriented to person, place, and time.      Motor: No abnormal muscle tone.      Coordination: Coordination " normal.      Deep Tendon Reflexes: Reflexes are normal and symmetric. Reflexes normal.   Psychiatric:         Behavior: Behavior normal.         Thought Content: Thought content normal.         Judgment: Judgment normal.                    Assessment & Plan        1. Systolic murmur-this new problem found incidentally on physical exam auscultation of the heart.  Check for aortic stenosis or mitral regurg with echocardiogram and call results.  Otherwise recheck annually if unremarkable.     - EC-ECHOCARDIOGRAM COMPLETE W/O CONT; Future    2. Dyslipidemia    Under good control. Continue same regimen.    Under good control. Continue same regimen.'  - VITAMIN D,25 HYDROXY (DEFICIENCY); Future  - CBC WITH DIFFERENTIAL; Future  - Lipid Profile; Future    3. Vitamin D deficiency    Under good control. Continue same regimen.  - Comp Metabolic Panel; Future    4. Class 3 severe obesity in adult, unspecified BMI, unspecified obesity type, unspecified whether serious comorbidity present (HCC)    diet/exercise/lose 15 lbs.; patient counseled'  - Patient identified as having weight management issue.  Appropriate orders and counseling given.    5. Chronic respiratory failure with hypoxia (HCC)- nocturnal o2     Under good control. Continue same regimen.    6. Iron deficiency- due to gastric bypass       Under good control. Continue same regimen.  7. Hypogonadism male     Under good control. Continue same regimen.    8. CARLOS treated with BiPAP     Under good control. Continue same regimen.    9. Obesity (BMI 30-39.9)      diet/exercise/lose 15 lbs.; patient counseled'  - Patient identified as having weight management issue.  Appropriate orders and counseling given.    10. Vitamin B 12 deficiency- due to gastric bypass     Under good control. Continue same regimen.    11. Gastric bypass status for obesity-2005 dr gallo   Under good control. Continue same regimen.    12. Benign nodular prostatic hyperplasia with lower urinary tract  symptoms- controlled with prn tamsulosin 2-3x/wk        Under good control. Continue same regimen.

## 2022-10-13 ENCOUNTER — HOSPITAL ENCOUNTER (OUTPATIENT)
Dept: CARDIOLOGY | Facility: MEDICAL CENTER | Age: 72
End: 2022-10-13
Attending: INTERNAL MEDICINE
Payer: MEDICARE

## 2022-10-13 DIAGNOSIS — R01.1 SYSTOLIC MURMUR: ICD-10-CM

## 2022-10-13 LAB — LV EJECT FRACT  99904: 60

## 2022-10-13 PROCEDURE — 93306 TTE W/DOPPLER COMPLETE: CPT

## 2022-10-13 PROCEDURE — 93306 TTE W/DOPPLER COMPLETE: CPT | Mod: 26 | Performed by: INTERNAL MEDICINE

## 2022-10-21 ENCOUNTER — RESEARCH ENCOUNTER (OUTPATIENT)
Dept: MEDICAL GROUP | Facility: PHYSICIAN GROUP | Age: 72
End: 2022-10-21
Payer: MEDICARE

## 2022-10-21 DIAGNOSIS — Z00.6 RESEARCH STUDY PATIENT: ICD-10-CM

## 2022-10-25 ENCOUNTER — NON-PROVIDER VISIT (OUTPATIENT)
Dept: MEDICAL GROUP | Age: 72
End: 2022-10-25
Payer: MEDICARE

## 2022-10-25 ENCOUNTER — TELEPHONE (OUTPATIENT)
Dept: MEDICAL GROUP | Age: 72
End: 2022-10-25

## 2022-10-25 DIAGNOSIS — Z23 NEED FOR VACCINATION: ICD-10-CM

## 2022-10-25 PROCEDURE — 90746 HEPB VACCINE 3 DOSE ADULT IM: CPT | Performed by: INTERNAL MEDICINE

## 2022-10-25 PROCEDURE — G0010 ADMIN HEPATITIS B VACCINE: HCPCS | Performed by: INTERNAL MEDICINE

## 2022-10-25 NOTE — PROGRESS NOTES
"Jhony Rodriguez is a 72 y.o. male here for a non-provider visit for:   HEPATITIS B 1 of 3    Reason for immunization: Overdue/Provider Recommended  Immunization records indicate need for vaccine: Yes, confirmed with Epic and confirmed with [place] [employer]  Minimum interval has been met for this vaccine: Yes  ABN completed: Not Indicated    VIS Dated  08/15/2019 was given to patient: Yes  All IAC Questionnaire questions were answered \"No.\"    Patient tolerated injection and no adverse effects were observed or reported: Yes    Pt scheduled for next dose in series: No  "

## 2022-12-05 LAB
APOB+LDLR+PCSK9 GENE MUT ANL BLD/T: NOT DETECTED
BRCA1+BRCA2 DEL+DUP + FULL MUT ANL BLD/T: NOT DETECTED
MLH1+MSH2+MSH6+PMS2 GN DEL+DUP+FUL M: NOT DETECTED

## 2022-12-20 ENCOUNTER — DOCUMENTATION (OUTPATIENT)
Dept: HEALTH INFORMATION MANAGEMENT | Facility: OTHER | Age: 72
End: 2022-12-20
Payer: MEDICARE

## 2023-01-09 DIAGNOSIS — N41.0 ACUTE PROSTATITIS WITHOUT HEMATURIA: ICD-10-CM

## 2023-01-10 RX ORDER — SULFAMETHOXAZOLE AND TRIMETHOPRIM 800; 160 MG/1; MG/1
1 TABLET ORAL 2 TIMES DAILY
Qty: 60 TABLET | Refills: 2 | Status: SHIPPED | OUTPATIENT
Start: 2023-01-10 | End: 2023-03-08

## 2023-02-13 PROBLEM — I73.9 PERIPHERAL VASCULAR DISEASE, UNSPECIFIED (HCC): Status: ACTIVE | Noted: 2023-02-13

## 2023-03-08 ENCOUNTER — OFFICE VISIT (OUTPATIENT)
Dept: MEDICAL GROUP | Facility: MEDICAL CENTER | Age: 73
End: 2023-03-08
Payer: MEDICARE

## 2023-03-08 VITALS
HEART RATE: 57 BPM | SYSTOLIC BLOOD PRESSURE: 114 MMHG | OXYGEN SATURATION: 97 % | HEIGHT: 75 IN | TEMPERATURE: 97.8 F | DIASTOLIC BLOOD PRESSURE: 54 MMHG | BODY MASS INDEX: 38.82 KG/M2 | WEIGHT: 312.2 LBS

## 2023-03-08 DIAGNOSIS — E61.1 IRON DEFICIENCY: ICD-10-CM

## 2023-03-08 DIAGNOSIS — E66.9 OBESITY (BMI 30-39.9): ICD-10-CM

## 2023-03-08 DIAGNOSIS — G47.33 OSA TREATED WITH BIPAP: ICD-10-CM

## 2023-03-08 DIAGNOSIS — K64.0 GRADE I HEMORRHOIDS: ICD-10-CM

## 2023-03-08 DIAGNOSIS — K62.5 PAINLESS RECTAL BLEEDING: ICD-10-CM

## 2023-03-08 PROBLEM — J96.11 CHRONIC RESPIRATORY FAILURE WITH HYPOXIA (HCC): Status: RESOLVED | Noted: 2020-03-12 | Resolved: 2023-03-08

## 2023-03-08 PROCEDURE — 99214 OFFICE O/P EST MOD 30 MIN: CPT | Performed by: PHYSICIAN ASSISTANT

## 2023-03-08 ASSESSMENT — FIBROSIS 4 INDEX: FIB4 SCORE: 1.19

## 2023-03-08 NOTE — ASSESSMENT & PLAN NOTE
This is a pleasant 72-year-old male complains of a 3-day history of rectal bleeding.  Painless.  He states he will bleed even if he is sitting down.  Had a colonoscopy in 2019.  He will pass blood as well before and after bowel movements.  Feels constipated but does have a bowel movement daily.  Denies any recent issues with hard stool.

## 2023-03-08 NOTE — ASSESSMENT & PLAN NOTE
History of iron deficiency secondary to gastric bypass surgery.  Is not taking an iron supplement.

## 2023-03-08 NOTE — ASSESSMENT & PLAN NOTE
Does use BiPAP when appropriate.  Denies that he has ever been told or diagnosed with chronic respiratory failure.

## 2023-03-08 NOTE — PROGRESS NOTES
Subjective:   Jhony Rodriguez is a 72 y.o. male here today for rectal bleeding for 3 days and a history of iron deficiency.    Painless rectal bleeding  This is a pleasant 72-year-old male complains of a 3-day history of rectal bleeding.  Painless.  He states he will bleed even if he is sitting down.  Had a colonoscopy in 2019.  He will pass blood as well before and after bowel movements.  Feels constipated but does have a bowel movement daily.  Denies any recent issues with hard stool.    CARLOS treated with BiPAP  Does use BiPAP when appropriate.  Denies that he has ever been told or diagnosed with chronic respiratory failure.    Iron deficiency- due to gastric bypass  History of iron deficiency secondary to gastric bypass surgery.  Is not taking an iron supplement.       Current medicines (including changes today)  Current Outpatient Medications   Medication Sig Dispense Refill    hydrocortisone 2.5 % Cream topical cream Apply 1 Application topically 2 times a day. 28 g 0    tamsulosin (FLOMAX) 0.4 MG capsule TAKE 1 CAP BY MOUTH ONE-HALF HOUR AFTER BREAKFAST. 90 Capsule 5    Cholecalciferol (VITAMIN D3) 2000 UNIT Cap Take 1 Cap by mouth See Admin Instructions. 2 times a week      Cyanocobalamin (B-12) 1000 MCG Tab CR Take 1 Tab by mouth See Admin Instructions. 2 times a week      Ferrous Sulfate Dried 45 MG Tab CR Take 1 Tab by mouth See Admin Instructions. 2 times a week (Patient not taking: Reported on 2/8/2022)       No current facility-administered medications for this visit.     He  has a past medical history of Amnesia, global, transient- 2008, resolved; no recurrence; possibly due to due to medication interaction- celebrex and mobic   (1/19/2017), Arthritis, Atrial fibrillation ([facility]) (2007), Bilateral leg edema (3/12/2020), BMI 40.0-44.9, adult ([facility]) (2/8/2022), H/O cardiac radiofrequency ablation (2006), Hip pain, right, History of atrial fibrillation (5/20/2020), History of obstructive sleep apnea-  "resolved with weight loss (6/20/2016), Obesity, Obesity (BMI 30-39.9) (2/13/2018), Primary osteoarthritis of right hip- THR tbd 5/2020-n dr barraza (5/23/2019), Prophylactic antibiotic- for dental work due to TKRs (9/14/2017), S/P total knee arthroplasty, bilateral (4/13/2020), Sleep apnea, and Snoring.    Social History and Family History were reviewed and updated.    ROS   No chest pain, no shortness of breath, no abdominal pain and all other systems were reviewed and are negative.       Objective:     /54 (BP Location: Right arm, Patient Position: Sitting, BP Cuff Size: Adult)   Pulse (!) 57   Temp 36.6 °C (97.8 °F) (Temporal)   Ht 1.892 m (6' 2.5\")   Wt (!) 142 kg (312 lb 3.2 oz)   SpO2 97%  Body mass index is 39.55 kg/m².   Physical Exam:  Constitutional: Alert, no distress.  Skin: Warm, dry, good turgor, no rashes in visible areas.  Eye: Equal, round and reactive, conjunctiva clear, lids normal.  ENMT: Lips without lesions, good dentition, oropharynx clear.  Neck: Trachea midline, no masses.   Lymph: No cervical or supraclavicular lymphadenopathy  Respiratory: Unlabored respiratory effort, lungs appear clear.  Rectal examination: There is a hemorrhoid located at the approximate 3 o'clock position.  Currently bleeding.  Psych: Alert and oriented x3, normal affect and mood.        Assessment and Plan:   The following treatment plan was discussed    1. Painless rectal bleeding  Acute, new onset condition secondary to hemorrhoid.  Reviewed colonoscopy report performed in 2019.  Referred to GI for discussion on banding.  Provided hydrocortisone cream to use as directed.  Ordered labs to review iron indices if the bleeding continues.  May need to restart iron supplement.  - Referral to Gastroenterology  - CBC WITH DIFFERENTIAL; Future  - IRON/TOTAL IRON BIND; Future  - FERRITIN; Future  - hydrocortisone 2.5 % Cream topical cream; Apply 1 Application topically 2 times a day.  Dispense: 28 g; Refill: " 0    2. Grade I hemorrhoids  Acute, new onset condition.  Refer to GI for evaluation and treatment.  - Referral to Gastroenterology  - CBC WITH DIFFERENTIAL; Future  - IRON/TOTAL IRON BIND; Future  - FERRITIN; Future  - hydrocortisone 2.5 % Cream topical cream; Apply 1 Application topically 2 times a day.  Dispense: 28 g; Refill: 0    3. Iron deficiency- due to gastric bypass  Chronic condition.  Status unknown.  Ordered iron indices in case she continues to bleed.  May start iron supplement chronic condition.  Stable.  If needed.    4. CARLOS treated with BiPAP  Continue use as BiPAP as needed or as directed by PCP.    5. Obesity (BMI 30-39.9)  Chronic condition.  Stable.  He has an appointment to follow-up with his PCP.  - Patient identified as having weight management issue.  Appropriate orders and counseling given.         Followup: Return if symptoms worsen or fail to improve.    Please note that this dictation was created using voice recognition software. I have made every reasonable attempt to correct obvious errors, but I expect that there are errors of grammar and possibly content that I did not discover before finalizing the note.

## 2023-03-15 ENCOUNTER — HOSPITAL ENCOUNTER (OUTPATIENT)
Dept: LAB | Facility: MEDICAL CENTER | Age: 73
End: 2023-03-15
Attending: PHYSICIAN ASSISTANT
Payer: MEDICARE

## 2023-03-15 DIAGNOSIS — K62.5 PAINLESS RECTAL BLEEDING: ICD-10-CM

## 2023-03-15 DIAGNOSIS — E78.5 DYSLIPIDEMIA: ICD-10-CM

## 2023-03-15 DIAGNOSIS — K64.0 GRADE I HEMORRHOIDS: ICD-10-CM

## 2023-03-15 LAB
25(OH)D3 SERPL-MCNC: 21 NG/ML (ref 30–100)
BASOPHILS # BLD AUTO: 0.8 % (ref 0–1.8)
BASOPHILS # BLD AUTO: 1 % (ref 0–1.8)
BASOPHILS # BLD: 0.05 K/UL (ref 0–0.12)
BASOPHILS # BLD: 0.06 K/UL (ref 0–0.12)
EOSINOPHIL # BLD AUTO: 0.17 K/UL (ref 0–0.51)
EOSINOPHIL # BLD AUTO: 0.19 K/UL (ref 0–0.51)
EOSINOPHIL NFR BLD: 2.7 % (ref 0–6.9)
EOSINOPHIL NFR BLD: 3 % (ref 0–6.9)
ERYTHROCYTE [DISTWIDTH] IN BLOOD BY AUTOMATED COUNT: 43.9 FL (ref 35.9–50)
ERYTHROCYTE [DISTWIDTH] IN BLOOD BY AUTOMATED COUNT: 44.4 FL (ref 35.9–50)
FERRITIN SERPL-MCNC: 34.8 NG/ML (ref 22–322)
HCT VFR BLD AUTO: 44.2 % (ref 42–52)
HCT VFR BLD AUTO: 44.5 % (ref 42–52)
HGB BLD-MCNC: 13.8 G/DL (ref 14–18)
HGB BLD-MCNC: 14 G/DL (ref 14–18)
IMM GRANULOCYTES # BLD AUTO: 0.03 K/UL (ref 0–0.11)
IMM GRANULOCYTES # BLD AUTO: 0.03 K/UL (ref 0–0.11)
IMM GRANULOCYTES NFR BLD AUTO: 0.5 % (ref 0–0.9)
IMM GRANULOCYTES NFR BLD AUTO: 0.5 % (ref 0–0.9)
IRON SATN MFR SERPL: 21 % (ref 15–55)
IRON SERPL-MCNC: 75 UG/DL (ref 50–180)
LYMPHOCYTES # BLD AUTO: 0.8 K/UL (ref 1–4.8)
LYMPHOCYTES # BLD AUTO: 0.87 K/UL (ref 1–4.8)
LYMPHOCYTES NFR BLD: 12.9 % (ref 22–41)
LYMPHOCYTES NFR BLD: 13.7 % (ref 22–41)
MCH RBC QN AUTO: 28.5 PG (ref 27–33)
MCH RBC QN AUTO: 28.7 PG (ref 27–33)
MCHC RBC AUTO-ENTMCNC: 31.2 G/DL (ref 33.7–35.3)
MCHC RBC AUTO-ENTMCNC: 31.5 G/DL (ref 33.7–35.3)
MCV RBC AUTO: 91.1 FL (ref 81.4–97.8)
MCV RBC AUTO: 91.4 FL (ref 81.4–97.8)
MONOCYTES # BLD AUTO: 0.41 K/UL (ref 0–0.85)
MONOCYTES # BLD AUTO: 0.43 K/UL (ref 0–0.85)
MONOCYTES NFR BLD AUTO: 6.6 % (ref 0–13.4)
MONOCYTES NFR BLD AUTO: 6.8 % (ref 0–13.4)
NEUTROPHILS # BLD AUTO: 4.73 K/UL (ref 1.82–7.42)
NEUTROPHILS # BLD AUTO: 4.78 K/UL (ref 1.82–7.42)
NEUTROPHILS NFR BLD: 75.2 % (ref 44–72)
NEUTROPHILS NFR BLD: 76.3 % (ref 44–72)
NRBC # BLD AUTO: 0 K/UL
NRBC # BLD AUTO: 0 K/UL
NRBC BLD-RTO: 0 /100 WBC
NRBC BLD-RTO: 0 /100 WBC
PLATELET # BLD AUTO: 268 K/UL (ref 164–446)
PLATELET # BLD AUTO: 271 K/UL (ref 164–446)
PMV BLD AUTO: 10.4 FL (ref 9–12.9)
PMV BLD AUTO: 10.7 FL (ref 9–12.9)
RBC # BLD AUTO: 4.85 M/UL (ref 4.7–6.1)
RBC # BLD AUTO: 4.87 M/UL (ref 4.7–6.1)
TIBC SERPL-MCNC: 355 UG/DL (ref 250–450)
UIBC SERPL-MCNC: 280 UG/DL (ref 110–370)
WBC # BLD AUTO: 6.2 K/UL (ref 4.8–10.8)
WBC # BLD AUTO: 6.4 K/UL (ref 4.8–10.8)

## 2023-03-15 PROCEDURE — 83550 IRON BINDING TEST: CPT

## 2023-03-15 PROCEDURE — 36415 COLL VENOUS BLD VENIPUNCTURE: CPT

## 2023-03-15 PROCEDURE — 85025 COMPLETE CBC W/AUTO DIFF WBC: CPT

## 2023-03-15 PROCEDURE — 85025 COMPLETE CBC W/AUTO DIFF WBC: CPT | Mod: 91

## 2023-03-15 PROCEDURE — 82728 ASSAY OF FERRITIN: CPT

## 2023-03-15 PROCEDURE — 83540 ASSAY OF IRON: CPT

## 2023-03-15 PROCEDURE — 82306 VITAMIN D 25 HYDROXY: CPT

## 2023-03-16 ENCOUNTER — HOSPITAL ENCOUNTER (OUTPATIENT)
Dept: LAB | Facility: MEDICAL CENTER | Age: 73
End: 2023-03-16
Attending: INTERNAL MEDICINE
Payer: MEDICARE

## 2023-03-16 DIAGNOSIS — E78.5 DYSLIPIDEMIA: ICD-10-CM

## 2023-03-16 DIAGNOSIS — E55.9 VITAMIN D DEFICIENCY: ICD-10-CM

## 2023-03-16 LAB
ALBUMIN SERPL BCP-MCNC: 3.7 G/DL (ref 3.2–4.9)
ALBUMIN/GLOB SERPL: 1.6 G/DL
ALP SERPL-CCNC: 77 U/L (ref 30–99)
ALT SERPL-CCNC: 15 U/L (ref 2–50)
ANION GAP SERPL CALC-SCNC: 9 MMOL/L (ref 7–16)
AST SERPL-CCNC: 16 U/L (ref 12–45)
BILIRUB SERPL-MCNC: 0.4 MG/DL (ref 0.1–1.5)
BUN SERPL-MCNC: 20 MG/DL (ref 8–22)
CALCIUM ALBUM COR SERPL-MCNC: 9.1 MG/DL (ref 8.5–10.5)
CALCIUM SERPL-MCNC: 8.9 MG/DL (ref 8.5–10.5)
CHLORIDE SERPL-SCNC: 108 MMOL/L (ref 96–112)
CHOLEST SERPL-MCNC: 159 MG/DL (ref 100–199)
CO2 SERPL-SCNC: 23 MMOL/L (ref 20–33)
CREAT SERPL-MCNC: 0.9 MG/DL (ref 0.5–1.4)
FASTING STATUS PATIENT QL REPORTED: NORMAL
GFR SERPLBLD CREATININE-BSD FMLA CKD-EPI: 92 ML/MIN/1.73 M 2
GLOBULIN SER CALC-MCNC: 2.3 G/DL (ref 1.9–3.5)
GLUCOSE SERPL-MCNC: 89 MG/DL (ref 65–99)
HDLC SERPL-MCNC: 60 MG/DL
LDLC SERPL CALC-MCNC: 87 MG/DL
POTASSIUM SERPL-SCNC: 4.6 MMOL/L (ref 3.6–5.5)
PROT SERPL-MCNC: 6 G/DL (ref 6–8.2)
SODIUM SERPL-SCNC: 140 MMOL/L (ref 135–145)
TRIGL SERPL-MCNC: 58 MG/DL (ref 0–149)

## 2023-03-16 PROCEDURE — 36415 COLL VENOUS BLD VENIPUNCTURE: CPT

## 2023-03-16 PROCEDURE — 80053 COMPREHEN METABOLIC PANEL: CPT

## 2023-03-16 PROCEDURE — 80061 LIPID PANEL: CPT

## 2023-03-22 ENCOUNTER — OFFICE VISIT (OUTPATIENT)
Dept: MEDICAL GROUP | Age: 73
End: 2023-03-22
Payer: MEDICARE

## 2023-03-22 VITALS
WEIGHT: 310 LBS | SYSTOLIC BLOOD PRESSURE: 122 MMHG | DIASTOLIC BLOOD PRESSURE: 60 MMHG | OXYGEN SATURATION: 97 % | HEART RATE: 56 BPM | BODY MASS INDEX: 38.54 KG/M2 | HEIGHT: 75 IN | TEMPERATURE: 97.6 F

## 2023-03-22 DIAGNOSIS — E55.9 VITAMIN D DEFICIENCY: ICD-10-CM

## 2023-03-22 DIAGNOSIS — E61.1 IRON DEFICIENCY: ICD-10-CM

## 2023-03-22 DIAGNOSIS — E53.8 VITAMIN B 12 DEFICIENCY: ICD-10-CM

## 2023-03-22 DIAGNOSIS — E78.5 DYSLIPIDEMIA: ICD-10-CM

## 2023-03-22 DIAGNOSIS — N40.0 BPH WITHOUT URINARY OBSTRUCTION: ICD-10-CM

## 2023-03-22 DIAGNOSIS — N40.1 BENIGN NODULAR PROSTATIC HYPERPLASIA WITH LOWER URINARY TRACT SYMPTOMS: ICD-10-CM

## 2023-03-22 DIAGNOSIS — Z23 NEED FOR VACCINATION: ICD-10-CM

## 2023-03-22 PROCEDURE — G0010 ADMIN HEPATITIS B VACCINE: HCPCS | Performed by: INTERNAL MEDICINE

## 2023-03-22 PROCEDURE — 99214 OFFICE O/P EST MOD 30 MIN: CPT | Mod: 25 | Performed by: INTERNAL MEDICINE

## 2023-03-22 PROCEDURE — 90746 HEPB VACCINE 3 DOSE ADULT IM: CPT | Performed by: INTERNAL MEDICINE

## 2023-03-22 RX ORDER — ACETAMINOPHEN 160 MG
1 TABLET,DISINTEGRATING ORAL SEE ADMIN INSTRUCTIONS
Qty: 100 CAPSULE | Refills: 3 | Status: SHIPPED | OUTPATIENT
Start: 2023-03-22 | End: 2023-06-15

## 2023-03-22 RX ORDER — CHOLECALCIFEROL (VITAMIN D3) 25 MCG
1 TABLET,CHEWABLE ORAL SEE ADMIN INSTRUCTIONS
Qty: 100 TABLET | Refills: 3 | Status: SHIPPED | OUTPATIENT
Start: 2023-03-22

## 2023-03-22 RX ORDER — TAMSULOSIN HYDROCHLORIDE 0.4 MG/1
0.4 CAPSULE ORAL
Qty: 90 CAPSULE | Refills: 5 | Status: SHIPPED | OUTPATIENT
Start: 2023-03-22 | End: 2023-05-31 | Stop reason: SDUPTHER

## 2023-03-22 ASSESSMENT — ENCOUNTER SYMPTOMS
GASTROINTESTINAL NEGATIVE: 1
PSYCHIATRIC NEGATIVE: 1
CARDIOVASCULAR NEGATIVE: 1
CONSTITUTIONAL NEGATIVE: 1
NEUROLOGICAL NEGATIVE: 1
EYES NEGATIVE: 1
MUSCULOSKELETAL NEGATIVE: 1
RESPIRATORY NEGATIVE: 1

## 2023-03-22 ASSESSMENT — FIBROSIS 4 INDEX: FIB4 SCORE: 1.08

## 2023-03-22 NOTE — PROGRESS NOTES
Subjective     Jhony Rodriguez is a 72 y.o. male who presents with Follow-Up (Lab review )    The patient is here for followup of chronic medical problems listed below. The patient is compliant with medications and having no side effects from them. Denies chest pain, abdominal pain, dyspnea, myalgias, or cough.   /  Patient Active Problem List   Diagnosis    S/P RF ablation operation for arrhythmia-2008; no recurrence; no meds    Gastric bypass status for obesity-2005 dr gallo    Vitamin B 12 deficiency- due to gastric bypass    Iron deficiency- due to gastric bypass    Benign nodular prostatic hyperplasia with lower urinary tract symptoms- controlled with prn tamsulosin 2-3x/wk    Vitamin D deficiency    Hypogonadism male    CARLOS treated with BiPAP    Class 3 severe obesity in adult (HCC)    Dyslipidemia    Obesity (BMI 30-39.9)    Peripheral vascular disease, unspecified ([facility])    Painless rectal bleeding    Grade I hemorrhoids       Outpatient Medications Prior to Visit   Medication Sig Dispense Refill    hydrocortisone 2.5 % Cream topical cream Apply 1 Application topically 2 times a day. 28 g 0    tamsulosin (FLOMAX) 0.4 MG capsule TAKE 1 CAP BY MOUTH ONE-HALF HOUR AFTER BREAKFAST. 90 Capsule 5    Cholecalciferol (VITAMIN D3) 2000 UNIT Cap Take 1 Cap by mouth See Admin Instructions. 2 times a week      Cyanocobalamin (B-12) 1000 MCG Tab CR Take 1 Tab by mouth See Admin Instructions. 2 times a week      Ferrous Sulfate Dried 45 MG Tab CR Take 1 Tab by mouth See Admin Instructions. 2 times a week (Patient not taking: Reported on 2/8/2022)       No facility-administered medications prior to visit.             HPI    Review of Systems   Constitutional: Negative.    HENT: Negative.     Eyes: Negative.    Respiratory: Negative.     Cardiovascular: Negative.    Gastrointestinal: Negative.    Genitourinary: Negative.    Musculoskeletal: Negative.    Skin: Negative.    Neurological: Negative.   "  Endo/Heme/Allergies: Negative.    Psychiatric/Behavioral: Negative.              Objective     /60 (BP Location: Right arm, Patient Position: Sitting, BP Cuff Size: Adult)   Pulse (!) 56   Temp 36.4 °C (97.6 °F) (Temporal)   Ht 1.905 m (6' 3\")   Wt (!) 141 kg (310 lb)   SpO2 97%   BMI 38.75 kg/m²      Physical Exam  Constitutional:       General: He is not in acute distress.     Appearance: He is well-developed. He is not diaphoretic.   HENT:      Head: Normocephalic and atraumatic.      Right Ear: External ear normal.      Left Ear: External ear normal.      Nose: Nose normal.      Mouth/Throat:      Pharynx: No oropharyngeal exudate.   Eyes:      General: No scleral icterus.        Right eye: No discharge.         Left eye: No discharge.      Conjunctiva/sclera: Conjunctivae normal.      Pupils: Pupils are equal, round, and reactive to light.   Neck:      Thyroid: No thyromegaly.      Vascular: No JVD.      Trachea: No tracheal deviation.   Cardiovascular:      Rate and Rhythm: Normal rate and regular rhythm.      Heart sounds: Normal heart sounds. No murmur heard.    No friction rub. No gallop.   Pulmonary:      Effort: Pulmonary effort is normal. No respiratory distress.      Breath sounds: Normal breath sounds. No stridor. No wheezing or rales.   Chest:      Chest wall: No tenderness.   Abdominal:      General: Bowel sounds are normal. There is no distension.      Palpations: Abdomen is soft. There is no mass.      Tenderness: There is no abdominal tenderness. There is no guarding or rebound.   Musculoskeletal:         General: No tenderness. Normal range of motion.      Cervical back: Normal range of motion and neck supple.   Lymphadenopathy:      Cervical: No cervical adenopathy.   Skin:     General: Skin is warm and dry.      Coloration: Skin is not pale.      Findings: No erythema or rash.   Neurological:      Mental Status: He is alert and oriented to person, place, and time.      Motor: No " abnormal muscle tone.      Coordination: Coordination normal.      Deep Tendon Reflexes: Reflexes are normal and symmetric. Reflexes normal.   Psychiatric:         Behavior: Behavior normal.         Thought Content: Thought content normal.         Judgment: Judgment normal.                           Assessment & Plan        1. Vitamin B 12 deficiency    Under good control. Continue same regimen.]  - Cyanocobalamin (B-12) 1000 MCG Tab CR; Take 1 Tablet by mouth see administration instructions. 2 times a week  Dispense: 100 Tablet; Refill: 3  - VITAMIN B12; Future    2. Vitamin D deficiency      Under good control. Continue same regimen.]  - Cholecalciferol (VITAMIN D3) 2000 UNIT Cap; Take 1 Capsule by mouth see administration instructions. 2 times a week  Dispense: 100 Capsule; Refill: 3  - VITAMIN D,25 HYDROXY (DEFICIENCY); Future    3. Iron deficiency- due to gastric bypass      Under good control. Continue same regimen.]    4. Benign nodular prostatic hyperplasia with lower urinary tract symptoms- controlled with prn tamsulosin 2-3x/wk      Under good control. Continue same regimen.]  - tamsulosin (FLOMAX) 0.4 MG capsule; Take 1 Capsule by mouth 1/2 hour after breakfast.  Dispense: 90 Capsule; Refill: 5    5. Dyslipidemia     Under good control. Continue same regimen.]  Mediterranean diet and exercise.  But since his 10-year [place] risk is 13.8% we might consider a low-dose of a statin in the future.    /The 10-year ASCVD risk score (Eliot CLINE, et al., 2019) is: 13.8%    Values used to calculate the score:      Age: 72 years      Sex: Male      Is Non- : No      Diabetic: No      Tobacco smoker: No      Systolic Blood Pressure: 122 mmHg      Is BP treated: No      HDL Cholesterol: 60 mg/dL      Total Cholesterol: 159 mg/dL         - CBC WITH DIFFERENTIAL; Future  - Comp Metabolic Panel; Future  - TSH; Future  - Lipid Profile; Future    6. BPH without urinary obstruction       Under good  control. Continue same regimen.]  - PROSTATE SPECIFIC AG DIAGNOSTIC; Future    7. Need for vaccination     - Hepatitis B Vaccine Adult 20+

## 2023-04-18 ENCOUNTER — PATIENT MESSAGE (OUTPATIENT)
Dept: MEDICAL GROUP | Age: 73
End: 2023-04-18
Payer: MEDICARE

## 2023-04-18 DIAGNOSIS — N41.0 ACUTE PROSTATITIS WITHOUT HEMATURIA: ICD-10-CM

## 2023-04-19 NOTE — PATIENT COMMUNICATION
Received request via: Patient    Was the patient seen in the last year in this department? Yes    Does the patient have an active prescription (recently filled or refills available) for medication(s) requested? No    Does the patient have long-term Plus and need 100 day supply (blood pressure, diabetes and cholesterol meds only)? Medication is not for cholesterol, blood pressure or diabetes  Requested Prescriptions     Pending Prescriptions Disp Refills    sulfamethoxazole-trimethoprim (BACTRIM DS) 800-160 MG tablet 60 Tablet 0     Sig: Take 1 Tablet by mouth 2 times a day.

## 2023-04-20 RX ORDER — SULFAMETHOXAZOLE AND TRIMETHOPRIM 800; 160 MG/1; MG/1
1 TABLET ORAL 2 TIMES DAILY
Qty: 60 TABLET | Refills: 0 | Status: SHIPPED | OUTPATIENT
Start: 2023-04-20 | End: 2023-05-31

## 2023-05-31 ENCOUNTER — OFFICE VISIT (OUTPATIENT)
Dept: MEDICAL GROUP | Age: 73
End: 2023-05-31
Payer: MEDICARE

## 2023-05-31 ENCOUNTER — HOSPITAL ENCOUNTER (OUTPATIENT)
Dept: LAB | Facility: MEDICAL CENTER | Age: 73
End: 2023-05-31
Attending: INTERNAL MEDICINE
Payer: MEDICARE

## 2023-05-31 ENCOUNTER — HOSPITAL ENCOUNTER (OUTPATIENT)
Facility: MEDICAL CENTER | Age: 73
End: 2023-05-31
Attending: INTERNAL MEDICINE
Payer: MEDICARE

## 2023-05-31 VITALS
TEMPERATURE: 98.1 F | DIASTOLIC BLOOD PRESSURE: 70 MMHG | BODY MASS INDEX: 38.05 KG/M2 | OXYGEN SATURATION: 96 % | WEIGHT: 306 LBS | HEIGHT: 75 IN | HEART RATE: 52 BPM | SYSTOLIC BLOOD PRESSURE: 130 MMHG

## 2023-05-31 DIAGNOSIS — E53.8 VITAMIN B 12 DEFICIENCY: ICD-10-CM

## 2023-05-31 DIAGNOSIS — R63.1 EXCESSIVE THIRST: ICD-10-CM

## 2023-05-31 DIAGNOSIS — E29.1 HYPOGONADISM MALE: ICD-10-CM

## 2023-05-31 DIAGNOSIS — E23.2 DIABETES INSIPIDUS (HCC): ICD-10-CM

## 2023-05-31 DIAGNOSIS — N40.1 BENIGN NODULAR PROSTATIC HYPERPLASIA WITH LOWER URINARY TRACT SYMPTOMS: ICD-10-CM

## 2023-05-31 DIAGNOSIS — E55.9 VITAMIN D DEFICIENCY: ICD-10-CM

## 2023-05-31 DIAGNOSIS — I34.0 MILD MITRAL REGURGITATION BY PRIOR ECHOCARDIOGRAM: ICD-10-CM

## 2023-05-31 DIAGNOSIS — N40.0 BPH WITHOUT URINARY OBSTRUCTION: ICD-10-CM

## 2023-05-31 DIAGNOSIS — N39.0 URINARY TRACT INFECTION WITHOUT HEMATURIA, SITE UNSPECIFIED: ICD-10-CM

## 2023-05-31 DIAGNOSIS — R73.01 IFG (IMPAIRED FASTING GLUCOSE): ICD-10-CM

## 2023-05-31 DIAGNOSIS — I35.1 MILD AORTIC INSUFFICIENCY: ICD-10-CM

## 2023-05-31 DIAGNOSIS — E78.5 DYSLIPIDEMIA: ICD-10-CM

## 2023-05-31 DIAGNOSIS — I73.9 PERIPHERAL VASCULAR DISEASE, UNSPECIFIED (HCC): ICD-10-CM

## 2023-05-31 DIAGNOSIS — E66.9 OBESITY (BMI 30-39.9): ICD-10-CM

## 2023-05-31 DIAGNOSIS — Z23 NEED FOR VACCINATION: ICD-10-CM

## 2023-05-31 LAB
25(OH)D3 SERPL-MCNC: 26 NG/ML (ref 30–100)
APPEARANCE UR: CLEAR
BASOPHILS # BLD AUTO: 1.1 % (ref 0–1.8)
BASOPHILS # BLD: 0.07 K/UL (ref 0–0.12)
BILIRUB UR STRIP-MCNC: NEGATIVE MG/DL
COLOR UR AUTO: YELLOW
CORTIS SERPL-MCNC: 10 UG/DL (ref 0–23)
EOSINOPHIL # BLD AUTO: 0.25 K/UL (ref 0–0.51)
EOSINOPHIL NFR BLD: 4 % (ref 0–6.9)
ERYTHROCYTE [DISTWIDTH] IN BLOOD BY AUTOMATED COUNT: 44 FL (ref 35.9–50)
GLUCOSE UR STRIP.AUTO-MCNC: NEGATIVE MG/DL
HCT VFR BLD AUTO: 39.6 % (ref 42–52)
HGB BLD-MCNC: 12.4 G/DL (ref 14–18)
IMM GRANULOCYTES # BLD AUTO: 0.01 K/UL (ref 0–0.11)
IMM GRANULOCYTES NFR BLD AUTO: 0.2 % (ref 0–0.9)
KETONES UR STRIP.AUTO-MCNC: NEGATIVE MG/DL
LEUKOCYTE ESTERASE UR QL STRIP.AUTO: NORMAL
LYMPHOCYTES # BLD AUTO: 0.67 K/UL (ref 1–4.8)
LYMPHOCYTES NFR BLD: 10.8 % (ref 22–41)
MCH RBC QN AUTO: 28.8 PG (ref 27–33)
MCHC RBC AUTO-ENTMCNC: 31.3 G/DL (ref 32.3–36.5)
MCV RBC AUTO: 92.1 FL (ref 81.4–97.8)
MONOCYTES # BLD AUTO: 0.45 K/UL (ref 0–0.85)
MONOCYTES NFR BLD AUTO: 7.3 % (ref 0–13.4)
NEUTROPHILS # BLD AUTO: 4.74 K/UL (ref 1.82–7.42)
NEUTROPHILS NFR BLD: 76.6 % (ref 44–72)
NITRITE UR QL STRIP.AUTO: NEGATIVE
NRBC # BLD AUTO: 0 K/UL
NRBC BLD-RTO: 0 /100 WBC (ref 0–0.2)
PH UR STRIP.AUTO: 5.5 [PH] (ref 5–8)
PLATELET # BLD AUTO: 252 K/UL (ref 164–446)
PMV BLD AUTO: 11.1 FL (ref 9–12.9)
PROT UR QL STRIP: NEGATIVE MG/DL
RBC # BLD AUTO: 4.3 M/UL (ref 4.7–6.1)
RBC UR QL AUTO: NEGATIVE
SP GR UR STRIP.AUTO: 1
T4 FREE SERPL-MCNC: 1.05 NG/DL (ref 0.93–1.7)
TSH SERPL DL<=0.005 MIU/L-ACNC: 2.85 UIU/ML (ref 0.38–5.33)
UROBILINOGEN UR STRIP-MCNC: 0.2 MG/DL
VIT B12 SERPL-MCNC: 461 PG/ML (ref 211–911)
WBC # BLD AUTO: 6.2 K/UL (ref 4.8–10.8)

## 2023-05-31 PROCEDURE — 83516 IMMUNOASSAY NONANTIBODY: CPT

## 2023-05-31 PROCEDURE — 84153 ASSAY OF PSA TOTAL: CPT

## 2023-05-31 PROCEDURE — 82533 TOTAL CORTISOL: CPT

## 2023-05-31 PROCEDURE — 84146 ASSAY OF PROLACTIN: CPT

## 2023-05-31 PROCEDURE — 80061 LIPID PANEL: CPT

## 2023-05-31 PROCEDURE — 85025 COMPLETE CBC W/AUTO DIFF WBC: CPT

## 2023-05-31 PROCEDURE — 81002 URINALYSIS NONAUTO W/O SCOPE: CPT | Performed by: INTERNAL MEDICINE

## 2023-05-31 PROCEDURE — 80053 COMPREHEN METABOLIC PANEL: CPT

## 2023-05-31 PROCEDURE — 87086 URINE CULTURE/COLONY COUNT: CPT

## 2023-05-31 PROCEDURE — 83001 ASSAY OF GONADOTROPIN (FSH): CPT

## 2023-05-31 PROCEDURE — 3078F DIAST BP <80 MM HG: CPT | Performed by: INTERNAL MEDICINE

## 2023-05-31 PROCEDURE — 86235 NUCLEAR ANTIGEN ANTIBODY: CPT | Mod: 91

## 2023-05-31 PROCEDURE — 82607 VITAMIN B-12: CPT

## 2023-05-31 PROCEDURE — 84443 ASSAY THYROID STIM HORMONE: CPT

## 2023-05-31 PROCEDURE — 84439 ASSAY OF FREE THYROXINE: CPT

## 2023-05-31 PROCEDURE — 83036 HEMOGLOBIN GLYCOSYLATED A1C: CPT

## 2023-05-31 PROCEDURE — 82306 VITAMIN D 25 HYDROXY: CPT

## 2023-05-31 PROCEDURE — 3075F SYST BP GE 130 - 139MM HG: CPT | Performed by: INTERNAL MEDICINE

## 2023-05-31 PROCEDURE — 83002 ASSAY OF GONADOTROPIN (LH): CPT

## 2023-05-31 PROCEDURE — 36415 COLL VENOUS BLD VENIPUNCTURE: CPT

## 2023-05-31 PROCEDURE — 99214 OFFICE O/P EST MOD 30 MIN: CPT | Performed by: INTERNAL MEDICINE

## 2023-05-31 RX ORDER — TAMSULOSIN HYDROCHLORIDE 0.4 MG/1
0.8 CAPSULE ORAL
Qty: 180 CAPSULE | Refills: 3 | Status: SHIPPED | OUTPATIENT
Start: 2023-05-31

## 2023-05-31 RX ORDER — NITROFURANTOIN 25; 75 MG/1; MG/1
100 CAPSULE ORAL 2 TIMES DAILY
Qty: 20 CAPSULE | Refills: 1 | Status: SHIPPED | OUTPATIENT
Start: 2023-05-31 | End: 2023-06-15

## 2023-05-31 ASSESSMENT — FIBROSIS 4 INDEX: FIB4 SCORE: 1.08

## 2023-05-31 ASSESSMENT — ENCOUNTER SYMPTOMS
EYES NEGATIVE: 1
MUSCULOSKELETAL NEGATIVE: 1
PSYCHIATRIC NEGATIVE: 1
POLYDIPSIA: 1
GASTROINTESTINAL NEGATIVE: 1
CONSTITUTIONAL NEGATIVE: 1
RESPIRATORY NEGATIVE: 1
CARDIOVASCULAR NEGATIVE: 1
NEUROLOGICAL NEGATIVE: 1

## 2023-06-01 LAB
ALBUMIN SERPL BCP-MCNC: 4 G/DL (ref 3.2–4.9)
ALBUMIN/GLOB SERPL: 1.7 G/DL
ALP SERPL-CCNC: 82 U/L (ref 30–99)
ALT SERPL-CCNC: 15 U/L (ref 2–50)
ANION GAP SERPL CALC-SCNC: 9 MMOL/L (ref 7–16)
AST SERPL-CCNC: 23 U/L (ref 12–45)
BILIRUB SERPL-MCNC: 0.3 MG/DL (ref 0.1–1.5)
BUN SERPL-MCNC: 25 MG/DL (ref 8–22)
CALCIUM ALBUM COR SERPL-MCNC: 8.9 MG/DL (ref 8.5–10.5)
CALCIUM SERPL-MCNC: 8.9 MG/DL (ref 8.5–10.5)
CHLORIDE SERPL-SCNC: 112 MMOL/L (ref 96–112)
CHOLEST SERPL-MCNC: 163 MG/DL (ref 100–199)
CO2 SERPL-SCNC: 21 MMOL/L (ref 20–33)
CREAT SERPL-MCNC: 1.92 MG/DL (ref 0.5–1.4)
EST. AVERAGE GLUCOSE BLD GHB EST-MCNC: 111 MG/DL
FASTING STATUS PATIENT QL REPORTED: NORMAL
FSH SERPL-ACNC: 42.9 MIU/ML (ref 1.5–12.4)
GFR SERPLBLD CREATININE-BSD FMLA CKD-EPI: 37 ML/MIN/1.73 M 2
GLOBULIN SER CALC-MCNC: 2.4 G/DL (ref 1.9–3.5)
GLUCOSE SERPL-MCNC: 91 MG/DL (ref 65–99)
HBA1C MFR BLD: 5.5 % (ref 4–5.6)
HDLC SERPL-MCNC: 61 MG/DL
LDLC SERPL CALC-MCNC: 90 MG/DL
LH SERPL-ACNC: 30.9 IU/L (ref 1.7–8.6)
POTASSIUM SERPL-SCNC: 5.6 MMOL/L (ref 3.6–5.5)
PROLACTIN SERPL-MCNC: 14.6 NG/ML (ref 2.1–17.7)
PROT SERPL-MCNC: 6.4 G/DL (ref 6–8.2)
PSA SERPL-MCNC: 0.3 NG/ML (ref 0–4)
SODIUM SERPL-SCNC: 142 MMOL/L (ref 135–145)
TRIGL SERPL-MCNC: 60 MG/DL (ref 0–149)

## 2023-06-01 NOTE — PROGRESS NOTES
Jaime Rodriguez is a 72 y.o. male who presents with UTI (Urinary frequency )  Patient continues to have urinary frequency.  Also has dry mouth.  Has been on Septra DS twice daily for the last 3 weeks with no improvement.  No history of diabetes.  Most recent blood work showed normal blood sugar fasting.    The patient is here for followup of chronic medical problems listed below. The patient is compliant with medications and having no side effects from them. Denies chest pain, abdominal pain, dyspnea, myalgias, or cough.   Patient Active Problem List   Diagnosis    S/P RF ablation operation for arrhythmia-2008; no recurrence; no meds    Gastric bypass status for obesity-2005 dr gallo    Vitamin B 12 deficiency- due to gastric bypass    Iron deficiency- due to gastric bypass    Benign nodular prostatic hyperplasia with lower urinary tract symptoms    Vitamin D deficiency    Hypogonadism male    CARLOS treated with BiPAP    Class 3 severe obesity in adult (HCC)    Dyslipidemia    Obesity (BMI 30-39.9)    Peripheral vascular disease, unspecified ([facility])    Painless rectal bleeding    Grade I hemorrhoids    Mild mitral regurgitation by prior echocardiogram    Mild aortic insufficiency    Urinary tract infection without hematuria    Excessive thirst    Diabetes insipidus ([facility])     Outpatient Medications Prior to Visit   Medication Sig Dispense Refill    sulfamethoxazole-trimethoprim (BACTRIM DS) 800-160 MG tablet Take 1 Tablet by mouth 2 times a day. 60 Tablet 0    Cyanocobalamin (B-12) 1000 MCG Tab CR Take 1 Tablet by mouth see administration instructions. 2 times a week 100 Tablet 3    Cholecalciferol (VITAMIN D3) 2000 UNIT Cap Take 1 Capsule by mouth see administration instructions. 2 times a week 100 Capsule 3    tamsulosin (FLOMAX) 0.4 MG capsule Take 1 Capsule by mouth 1/2 hour after breakfast. 90 Capsule 5    hydrocortisone 2.5 % Cream topical cream Apply 1 Application topically 2 times a day. 28  "g 0     No facility-administered medications prior to visit.               HPI    Review of Systems   Constitutional: Negative.    HENT: Negative.     Eyes: Negative.    Respiratory: Negative.     Cardiovascular: Negative.    Gastrointestinal: Negative.    Genitourinary:  Positive for frequency.   Musculoskeletal: Negative.    Skin: Negative.    Neurological: Negative.    Endo/Heme/Allergies:  Positive for polydipsia.   Psychiatric/Behavioral: Negative.                Objective     /70 (BP Location: Right arm, Patient Position: Sitting, BP Cuff Size: Adult)   Pulse (!) 52   Temp 36.7 °C (98.1 °F) (Temporal)   Ht 1.892 m (6' 2.5\")   Wt (!) 139 kg (306 lb)   SpO2 96%   BMI 38.76 kg/m²      Physical Exam  Constitutional:       General: He is not in acute distress.     Appearance: He is well-developed. He is not diaphoretic.   HENT:      Head: Normocephalic and atraumatic.      Right Ear: External ear normal.      Left Ear: External ear normal.      Nose: Nose normal.      Mouth/Throat:      Pharynx: No oropharyngeal exudate.   Eyes:      General: No scleral icterus.        Right eye: No discharge.         Left eye: No discharge.      Conjunctiva/sclera: Conjunctivae normal.      Pupils: Pupils are equal, round, and reactive to light.   Neck:      Thyroid: No thyromegaly.      Vascular: No JVD.      Trachea: No tracheal deviation.   Cardiovascular:      Rate and Rhythm: Normal rate and regular rhythm.      Heart sounds: Normal heart sounds. No murmur heard.     No friction rub. No gallop.   Pulmonary:      Effort: Pulmonary effort is normal. No respiratory distress.      Breath sounds: Normal breath sounds. No stridor. No wheezing or rales.   Chest:      Chest wall: No tenderness.   Abdominal:      General: Bowel sounds are normal. There is no distension.      Palpations: Abdomen is soft. There is no mass.      Tenderness: There is no abdominal tenderness. There is no guarding or rebound.   Musculoskeletal:  "        General: No tenderness. Normal range of motion.      Cervical back: Normal range of motion and neck supple.   Lymphadenopathy:      Cervical: No cervical adenopathy.   Skin:     General: Skin is warm and dry.      Coloration: Skin is not pale.      Findings: No erythema or rash.   Neurological:      Mental Status: He is alert and oriented to person, place, and time.      Motor: No abnormal muscle tone.      Coordination: Coordination normal.      Deep Tendon Reflexes: Reflexes are normal and symmetric. Reflexes normal.   Psychiatric:         Behavior: Behavior normal.         Thought Content: Thought content normal.         Judgment: Judgment normal.          Dipstick UA shows trace to 1+ leukocytes.                   Assessment & Plan        1. Benign nodular prostatic hyperplasia with lower urinary tract symptoms-not controlled.  Unclear etiology.  Rule out UTI.  Rule out BPH as the cause.  Refer to urology.  Since urinalysis shows evidence of possible cystitis we will switch to nitrofurantoin and stop the Septra DS.  Increase Flomax 2.8 mg daily.  - Referral to Urology  - tamsulosin (FLOMAX) 0.4 MG capsule; Take 2 Capsules by mouth 1/2 hour after breakfast.  Dispense: 180 Capsule; Refill: 3  - POCT Urinalysis    2. Dyslipidemia  Good control monitoring diet and exercise.  - FREE THYROXINE; Future    3. Vitamin D deficiency  Good control of vitamin D3 2000 units daily.  Continue unchanged    4. Hypogonadism male      Has prior history of hypogonadism.  Recheck level and consider testosterone therapy on follow-up visit.    5. Peripheral vascular disease, unspecified (HCC)  Patient had positive Quanta flow testing on previous [facility] [place] visit.  Needs arterial noninvasive studies to rule this diagnosis out as it seems to be a false positive.  - US-EXTREMITY ARTERY LOWER BILAT W/NAKUL (COMBO); Future    6. Mild mitral regurgitation by prior echocardiogram  No exertional dyspnea dizziness or chest pain or PND orthopnea.   No peripheral edema.  Continue surveillance    7. Mild aortic insufficiency  As per #6 above.  8. Need for vaccination     - Hepatitis B Vaccine Adult IM    9. Obesity (BMI 30-39.9)       10. Benign nodular prostatic hyperplasia with lower urinary tract symptoms- controlled with prn tamsulosin 2-3x/wk     - Referral to Urology  - tamsulosin (FLOMAX) 0.4 MG capsule; Take 2 Capsules by mouth 1/2 hour after breakfast.  Dispense: 180 Capsule; Refill: 3  - POCT Urinalysis    11. IFG (impaired fasting glucose)  Mentoring diet and exercise.  - HEMOGLOBIN A1C; Future    12. Body mass index 39.0-39.9, adult  Mediterranean diet and exercise.    13. Diabetes insipidus (HCC)  Rule this condition out.  Check pituitary studies.  - PROLACTIN; Future  - FSH/LH; Future  - CORTISOL - AM  - Referral to Endocrinology    14. Excessive thirst  As above  - Referral to Endocrinology  - SJOGREN'S AB, ANTI-SS-A/-SS-B  - CONNECTIVE TISSUE DISEASES PROFILE; Future    15. Urinary tract infection without hematuria, site unspecified  As above switching to nitrofurantoin from sulfa and check urine culture.  - URINE CULTURE(NEW); Future  - nitrofurantoin (MACROBID) 100 MG Cap; Take 1 Capsule by mouth 2 times a day.  Dispense: 20 Capsule; Refill: 1

## 2023-06-03 LAB
BACTERIA UR CULT: NORMAL
CENTROMERE IGG TITR SER IF: 0 AU/ML (ref 0–40)
ENA JO1 AB TITR SER: 0 AU/ML (ref 0–40)
ENA SCL70 IGG SER QL: 1 AU/ML (ref 0–40)
ENA SM IGG SER-ACNC: 1 AU/ML (ref 0–40)
ENA SS-B IGG SER IA-ACNC: 0 AU/ML (ref 0–40)
RIBOSOMAL P AB SER-ACNC: 2 AU/ML (ref 0–40)
SIGNIFICANT IND 70042: NORMAL
SITE SITE: NORMAL
SOURCE SOURCE: NORMAL
SSA52 R0ENA AB IGG Q0420: 19 AU/ML (ref 0–40)
SSA60 R0ENA AB IGG Q0419: 0 AU/ML (ref 0–40)
U1 SNRNP IGG SER QL: 14 UNITS (ref 0–19)

## 2023-06-15 ENCOUNTER — OFFICE VISIT (OUTPATIENT)
Dept: MEDICAL GROUP | Age: 73
End: 2023-06-15
Payer: MEDICARE

## 2023-06-15 VITALS
BODY MASS INDEX: 38.42 KG/M2 | DIASTOLIC BLOOD PRESSURE: 70 MMHG | HEIGHT: 75 IN | WEIGHT: 309 LBS | HEART RATE: 59 BPM | OXYGEN SATURATION: 98 % | TEMPERATURE: 98 F | SYSTOLIC BLOOD PRESSURE: 130 MMHG

## 2023-06-15 DIAGNOSIS — N40.1 BENIGN NODULAR PROSTATIC HYPERPLASIA WITH LOWER URINARY TRACT SYMPTOMS: ICD-10-CM

## 2023-06-15 DIAGNOSIS — R33.8 ACUTE URINARY RETENTION: ICD-10-CM

## 2023-06-15 DIAGNOSIS — N17.9 AKI (ACUTE KIDNEY INJURY) (HCC): ICD-10-CM

## 2023-06-15 DIAGNOSIS — E55.9 VITAMIN D INSUFFICIENCY: ICD-10-CM

## 2023-06-15 DIAGNOSIS — N39.0 URINARY TRACT INFECTION WITHOUT HEMATURIA, SITE UNSPECIFIED: ICD-10-CM

## 2023-06-15 PROBLEM — E23.2 DIABETES INSIPIDUS (HCC): Status: RESOLVED | Noted: 2023-05-31 | Resolved: 2023-06-15

## 2023-06-15 PROBLEM — R63.1 EXCESSIVE THIRST: Status: RESOLVED | Noted: 2023-05-31 | Resolved: 2023-06-15

## 2023-06-15 PROBLEM — K62.5 PAINLESS RECTAL BLEEDING: Status: RESOLVED | Noted: 2023-03-08 | Resolved: 2023-06-15

## 2023-06-15 PROCEDURE — 3078F DIAST BP <80 MM HG: CPT | Performed by: FAMILY MEDICINE

## 2023-06-15 PROCEDURE — 99214 OFFICE O/P EST MOD 30 MIN: CPT | Performed by: FAMILY MEDICINE

## 2023-06-15 PROCEDURE — 3075F SYST BP GE 130 - 139MM HG: CPT | Performed by: FAMILY MEDICINE

## 2023-06-15 RX ORDER — FINASTERIDE 5 MG/1
5 TABLET, FILM COATED ORAL DAILY
COMMUNITY

## 2023-06-15 RX ORDER — SULFAMETHOXAZOLE AND TRIMETHOPRIM 400; 80 MG/1; MG/1
1 TABLET ORAL 2 TIMES DAILY
COMMUNITY
End: 2023-06-15

## 2023-06-15 ASSESSMENT — FIBROSIS 4 INDEX: FIB4 SCORE: 1.65

## 2023-06-15 NOTE — PROGRESS NOTES
Subjective:   CC: Acute urinary to retention follow-up    HPI:     Jhony Rodriguez is a 72 y.o. male, established patient of the clinic.     Patient was seen by PCP on 5/31/2023 for urinary frequency, excessive thirst, dry mouth not improving with Bactrim.  At previous office visit, PCP increase his Flomax to 0.8 mg daily.  PCP also prescribed nitrofurantoin for UTI.  Patient was also referred to urology.  He was found to have urinary retention.  Finasteride 5 mg daily was prescribed by urology.  Patient was also advised to self cath twice daily.  Today, patient states that his urinary symptoms are improving.  Patient is feeling back to baseline.  Excessive thirst, dry mouth, urinary frequency has improved.  Patient is scheduled to see his urologist in 4 weeks.    Current medicines (including changes today)  Current Outpatient Medications   Medication Sig Dispense Refill    Cholecalciferol (DIALYVITE VITAMIN D 5000 PO) Take 5,000 Units by mouth every day.      tamsulosin (FLOMAX) 0.4 MG capsule Take 2 Capsules by mouth 1/2 hour after breakfast. 180 Capsule 3    Cyanocobalamin (B-12) 1000 MCG Tab CR Take 1 Tablet by mouth see administration instructions. 2 times a week 100 Tablet 3    hydrocortisone 2.5 % Cream topical cream Apply 1 Application topically 2 times a day. 28 g 0    finasteride (PROSCAR) 5 MG Tab Take 5 mg by mouth every day.       No current facility-administered medications for this visit.     He  has a past medical history of Amnesia, global, transient- 2008, resolved; no recurrence; possibly due to due to medication interaction- celebrex and mobic   (1/19/2017), Arthritis, Atrial fibrillation ([facility]) (2007), Bilateral leg edema (3/12/2020), BMI 40.0-44.9, adult ([facility]) (2/8/2022), H/O cardiac radiofrequency ablation (2006), Hip pain, right, History of atrial fibrillation (5/20/2020), History of obstructive sleep apnea- resolved with weight loss (6/20/2016), Obesity, Obesity (BMI 30-39.9)  "(2/13/2018), Primary osteoarthritis of right hip- THR tbd 5/2020-n dr barraza (5/23/2019), Prophylactic antibiotic- for dental work due to TKRs (9/14/2017), S/P total knee arthroplasty, bilateral (4/13/2020), Sleep apnea, and Snoring.    I reviewed patient's problem list, allergies, medications, family hx, social hx with patient and update EPIC.        Objective:     /70 (BP Location: Left arm, Patient Position: Sitting, BP Cuff Size: Adult)   Pulse (!) 59   Temp 36.7 °C (98 °F) (Temporal)   Ht 1.892 m (6' 2.5\")   Wt (!) 140 kg (309 lb)   SpO2 98%  Body mass index is 39.14 kg/m².    Physical Exam:  Constitutional: awake, alert, in no distress.  Skin: Warm, dry, good turgor, no rashes, bruises, ulcers in visible areas.  Eye: conjunctiva clear, lids neg for edema or lesions.  Neck: Trachea midline, no masses, no thyromegaly. No cervical or supraclavicular lymphadenopathy  Respiratory: Unlabored respiratory effort, lungs clear to auscultation, no wheezes, no rales.  Cardiovascular: Normal S1, S2, no murmur, no pedal edema.  Psych: Oriented x3, affect and mood wnl, intact judgement and insight.       Assessment and Plan:   The following treatment plan was discussed    1. Acute urinary retention  2. Benign nodular prostatic hyperplasia with lower urinary tract symptoms  Chronic BPH leading to outflow obstruction and acute urinary retention.  Patient is on finasteride 5 mg daily, tamsulosin 0.8 mg daily.  He self caths twice daily.  Urinary symptoms are improving.  Patient is feeling well.  He has a follow-up appointment with urology in 4 weeks.  - continue finasteride, tamsulosin, and self cath BID  - continue to follow-up with urology as directed.    3. CT (acute kidney injury) (HCC)  Recent lab was notable for CT, likely secondary to urinary retention.  Patient is doing much better with above-mentioned treatments.  We will repeat CMP for reassessment of kidney function.  - Comp Metabolic Panel; " Future    4. Urinary tract infection without hematuria, site unspecified  Resolved.    5. Vitamin D insufficiency  Recent lab was notable for vitamin D insufficiency.  -Over-the-counter vitamin D 3, 5000 units/day.    Nena Mahan M.D.      Followup: Return for follow up with PCP as directed.    Please note that this dictation was created using voice recognition software. I have made every reasonable attempt to correct obvious errors, but I expect that there are errors of grammar and possibly content that I did not discover before finalizing the note.

## 2023-06-29 ENCOUNTER — HOSPITAL ENCOUNTER (OUTPATIENT)
Dept: LAB | Facility: MEDICAL CENTER | Age: 73
End: 2023-06-29
Attending: FAMILY MEDICINE
Payer: MEDICARE

## 2023-06-29 DIAGNOSIS — N17.9 AKI (ACUTE KIDNEY INJURY) (HCC): ICD-10-CM

## 2023-06-29 LAB
ALBUMIN SERPL BCP-MCNC: 4 G/DL (ref 3.2–4.9)
ALBUMIN/GLOB SERPL: 1.7 G/DL
ALP SERPL-CCNC: 79 U/L (ref 30–99)
ALT SERPL-CCNC: 17 U/L (ref 2–50)
ANION GAP SERPL CALC-SCNC: 11 MMOL/L (ref 7–16)
AST SERPL-CCNC: 20 U/L (ref 12–45)
BILIRUB SERPL-MCNC: 0.4 MG/DL (ref 0.1–1.5)
BUN SERPL-MCNC: 27 MG/DL (ref 8–22)
CALCIUM ALBUM COR SERPL-MCNC: 9.1 MG/DL (ref 8.5–10.5)
CALCIUM SERPL-MCNC: 9.1 MG/DL (ref 8.5–10.5)
CHLORIDE SERPL-SCNC: 107 MMOL/L (ref 96–112)
CO2 SERPL-SCNC: 23 MMOL/L (ref 20–33)
CREAT SERPL-MCNC: 1.55 MG/DL (ref 0.5–1.4)
GFR SERPLBLD CREATININE-BSD FMLA CKD-EPI: 48 ML/MIN/1.73 M 2
GLOBULIN SER CALC-MCNC: 2.4 G/DL (ref 1.9–3.5)
GLUCOSE SERPL-MCNC: 73 MG/DL (ref 65–99)
POTASSIUM SERPL-SCNC: 5.3 MMOL/L (ref 3.6–5.5)
PROT SERPL-MCNC: 6.4 G/DL (ref 6–8.2)
SODIUM SERPL-SCNC: 141 MMOL/L (ref 135–145)

## 2023-06-29 PROCEDURE — 36415 COLL VENOUS BLD VENIPUNCTURE: CPT

## 2023-06-29 PROCEDURE — 80053 COMPREHEN METABOLIC PANEL: CPT

## 2023-08-14 ENCOUNTER — HOSPITAL ENCOUNTER (OUTPATIENT)
Dept: RADIOLOGY | Facility: MEDICAL CENTER | Age: 73
End: 2023-08-14
Attending: INTERNAL MEDICINE
Payer: MEDICARE

## 2023-08-14 DIAGNOSIS — I73.9 PERIPHERAL VASCULAR DISEASE, UNSPECIFIED (HCC): ICD-10-CM

## 2023-08-14 PROCEDURE — 93922 UPR/L XTREMITY ART 2 LEVELS: CPT

## 2023-08-14 PROCEDURE — 93922 UPR/L XTREMITY ART 2 LEVELS: CPT | Mod: 26 | Performed by: INTERNAL MEDICINE

## 2023-09-21 ENCOUNTER — HOSPITAL ENCOUNTER (OUTPATIENT)
Dept: LAB | Facility: MEDICAL CENTER | Age: 73
End: 2023-09-21
Attending: INTERNAL MEDICINE
Payer: MEDICARE

## 2023-09-21 DIAGNOSIS — E78.5 DYSLIPIDEMIA: ICD-10-CM

## 2023-09-21 DIAGNOSIS — E61.1 IRON DEFICIENCY: ICD-10-CM

## 2023-09-21 DIAGNOSIS — E55.9 VITAMIN D DEFICIENCY: ICD-10-CM

## 2023-09-21 DIAGNOSIS — I73.9 PERIPHERAL VASCULAR DISEASE, UNSPECIFIED (HCC): ICD-10-CM

## 2023-09-21 DIAGNOSIS — N40.1 BENIGN NODULAR PROSTATIC HYPERPLASIA WITH LOWER URINARY TRACT SYMPTOMS: ICD-10-CM

## 2023-09-21 DIAGNOSIS — E55.9 VITAMIN D INSUFFICIENCY: ICD-10-CM

## 2023-09-21 DIAGNOSIS — E53.8 VITAMIN B 12 DEFICIENCY: ICD-10-CM

## 2023-09-21 DIAGNOSIS — R73.01 IFG (IMPAIRED FASTING GLUCOSE): ICD-10-CM

## 2023-09-21 LAB
25(OH)D3 SERPL-MCNC: 25 NG/ML (ref 30–100)
ALBUMIN SERPL BCP-MCNC: 3.7 G/DL (ref 3.2–4.9)
ALBUMIN/GLOB SERPL: 1.7 G/DL
ALP SERPL-CCNC: 72 U/L (ref 30–99)
ALT SERPL-CCNC: 19 U/L (ref 2–50)
ANION GAP SERPL CALC-SCNC: 10 MMOL/L (ref 7–16)
AST SERPL-CCNC: 20 U/L (ref 12–45)
BASOPHILS # BLD AUTO: 1 % (ref 0–1.8)
BASOPHILS # BLD: 0.05 K/UL (ref 0–0.12)
BILIRUB SERPL-MCNC: 0.3 MG/DL (ref 0.1–1.5)
BUN SERPL-MCNC: 30 MG/DL (ref 8–22)
CALCIUM ALBUM COR SERPL-MCNC: 9.1 MG/DL (ref 8.5–10.5)
CALCIUM SERPL-MCNC: 8.9 MG/DL (ref 8.5–10.5)
CHLORIDE SERPL-SCNC: 113 MMOL/L (ref 96–112)
CHOLEST SERPL-MCNC: 138 MG/DL (ref 100–199)
CO2 SERPL-SCNC: 20 MMOL/L (ref 20–33)
CREAT SERPL-MCNC: 1.97 MG/DL (ref 0.5–1.4)
CRP SERPL HS-MCNC: 0.7 MG/L (ref 0–3)
EOSINOPHIL # BLD AUTO: 0.39 K/UL (ref 0–0.51)
EOSINOPHIL NFR BLD: 8.1 % (ref 0–6.9)
ERYTHROCYTE [DISTWIDTH] IN BLOOD BY AUTOMATED COUNT: 45.7 FL (ref 35.9–50)
ERYTHROCYTE [SEDIMENTATION RATE] IN BLOOD BY WESTERGREN METHOD: 17 MM/HOUR (ref 0–20)
EST. AVERAGE GLUCOSE BLD GHB EST-MCNC: 103 MG/DL
FASTING STATUS PATIENT QL REPORTED: NORMAL
FERRITIN SERPL-MCNC: 171 NG/ML (ref 22–322)
FOLATE SERPL-MCNC: 8.2 NG/ML
GFR SERPLBLD CREATININE-BSD FMLA CKD-EPI: 36 ML/MIN/1.73 M 2
GLOBULIN SER CALC-MCNC: 2.2 G/DL (ref 1.9–3.5)
GLUCOSE SERPL-MCNC: 88 MG/DL (ref 65–99)
HBA1C MFR BLD: 5.2 % (ref 4–5.6)
HCT VFR BLD AUTO: 33.7 % (ref 42–52)
HDLC SERPL-MCNC: 51 MG/DL
HGB BLD-MCNC: 10.5 G/DL (ref 14–18)
IMM GRANULOCYTES # BLD AUTO: 0.01 K/UL (ref 0–0.11)
IMM GRANULOCYTES NFR BLD AUTO: 0.2 % (ref 0–0.9)
IRON SATN MFR SERPL: 23 % (ref 15–55)
IRON SERPL-MCNC: 60 UG/DL (ref 50–180)
LDLC SERPL CALC-MCNC: 75 MG/DL
LYMPHOCYTES # BLD AUTO: 0.62 K/UL (ref 1–4.8)
LYMPHOCYTES NFR BLD: 12.9 % (ref 22–41)
MCH RBC QN AUTO: 29.4 PG (ref 27–33)
MCHC RBC AUTO-ENTMCNC: 31.2 G/DL (ref 32.3–36.5)
MCV RBC AUTO: 94.4 FL (ref 81.4–97.8)
MONOCYTES # BLD AUTO: 0.28 K/UL (ref 0–0.85)
MONOCYTES NFR BLD AUTO: 5.8 % (ref 0–13.4)
NEUTROPHILS # BLD AUTO: 3.46 K/UL (ref 1.82–7.42)
NEUTROPHILS NFR BLD: 72 % (ref 44–72)
NRBC # BLD AUTO: 0 K/UL
NRBC BLD-RTO: 0 /100 WBC (ref 0–0.2)
PLATELET # BLD AUTO: 232 K/UL (ref 164–446)
PMV BLD AUTO: 11 FL (ref 9–12.9)
POTASSIUM SERPL-SCNC: 4.8 MMOL/L (ref 3.6–5.5)
PROT SERPL-MCNC: 5.9 G/DL (ref 6–8.2)
PSA SERPL-MCNC: 0.08 NG/ML (ref 0–4)
RBC # BLD AUTO: 3.57 M/UL (ref 4.7–6.1)
SODIUM SERPL-SCNC: 143 MMOL/L (ref 135–145)
TIBC SERPL-MCNC: 257 UG/DL (ref 250–450)
TRIGL SERPL-MCNC: 59 MG/DL (ref 0–149)
TSH SERPL DL<=0.005 MIU/L-ACNC: 4.77 UIU/ML (ref 0.38–5.33)
UIBC SERPL-MCNC: 197 UG/DL (ref 110–370)
VIT B12 SERPL-MCNC: 913 PG/ML (ref 211–911)
WBC # BLD AUTO: 4.8 K/UL (ref 4.8–10.8)

## 2023-09-21 PROCEDURE — 84270 ASSAY OF SEX HORMONE GLOBUL: CPT

## 2023-09-21 PROCEDURE — 82728 ASSAY OF FERRITIN: CPT

## 2023-09-21 PROCEDURE — 84403 ASSAY OF TOTAL TESTOSTERONE: CPT

## 2023-09-21 PROCEDURE — 82607 VITAMIN B-12: CPT

## 2023-09-21 PROCEDURE — 84402 ASSAY OF FREE TESTOSTERONE: CPT

## 2023-09-21 PROCEDURE — 86141 C-REACTIVE PROTEIN HS: CPT

## 2023-09-21 PROCEDURE — 85652 RBC SED RATE AUTOMATED: CPT

## 2023-09-21 PROCEDURE — 84443 ASSAY THYROID STIM HORMONE: CPT

## 2023-09-21 PROCEDURE — 84153 ASSAY OF PSA TOTAL: CPT

## 2023-09-21 PROCEDURE — 80053 COMPREHEN METABOLIC PANEL: CPT

## 2023-09-21 PROCEDURE — 85025 COMPLETE CBC W/AUTO DIFF WBC: CPT

## 2023-09-21 PROCEDURE — 83540 ASSAY OF IRON: CPT

## 2023-09-21 PROCEDURE — 82746 ASSAY OF FOLIC ACID SERUM: CPT

## 2023-09-21 PROCEDURE — 83036 HEMOGLOBIN GLYCOSYLATED A1C: CPT

## 2023-09-21 PROCEDURE — 80061 LIPID PANEL: CPT

## 2023-09-21 PROCEDURE — 82306 VITAMIN D 25 HYDROXY: CPT

## 2023-09-21 PROCEDURE — 83550 IRON BINDING TEST: CPT

## 2023-09-21 PROCEDURE — 36415 COLL VENOUS BLD VENIPUNCTURE: CPT

## 2023-09-22 LAB
SHBG SERPL-SCNC: 101 NMOL/L (ref 19–76)
TESTOST FREE MFR SERPL: 0.8 % (ref 1.6–2.9)
TESTOST FREE SERPL-MCNC: 25 PG/ML (ref 47–244)
TESTOST SERPL-MCNC: 305 NG/DL (ref 300–720)

## 2023-09-22 SDOH — ECONOMIC STABILITY: INCOME INSECURITY: IN THE LAST 12 MONTHS, WAS THERE A TIME WHEN YOU WERE NOT ABLE TO PAY THE MORTGAGE OR RENT ON TIME?: NO

## 2023-09-22 SDOH — ECONOMIC STABILITY: FOOD INSECURITY: WITHIN THE PAST 12 MONTHS, YOU WORRIED THAT YOUR FOOD WOULD RUN OUT BEFORE YOU GOT MONEY TO BUY MORE.: NEVER TRUE

## 2023-09-22 SDOH — HEALTH STABILITY: PHYSICAL HEALTH: ON AVERAGE, HOW MANY DAYS PER WEEK DO YOU ENGAGE IN MODERATE TO STRENUOUS EXERCISE (LIKE A BRISK WALK)?: 2 DAYS

## 2023-09-22 SDOH — ECONOMIC STABILITY: TRANSPORTATION INSECURITY
IN THE PAST 12 MONTHS, HAS THE LACK OF TRANSPORTATION KEPT YOU FROM MEDICAL APPOINTMENTS OR FROM GETTING MEDICATIONS?: PATIENT DECLINED

## 2023-09-22 SDOH — HEALTH STABILITY: PHYSICAL HEALTH: ON AVERAGE, HOW MANY MINUTES DO YOU ENGAGE IN EXERCISE AT THIS LEVEL?: 60 MIN

## 2023-09-22 SDOH — ECONOMIC STABILITY: FOOD INSECURITY: WITHIN THE PAST 12 MONTHS, THE FOOD YOU BOUGHT JUST DIDN'T LAST AND YOU DIDN'T HAVE MONEY TO GET MORE.: NEVER TRUE

## 2023-09-22 SDOH — ECONOMIC STABILITY: TRANSPORTATION INSECURITY
IN THE PAST 12 MONTHS, HAS LACK OF TRANSPORTATION KEPT YOU FROM MEETINGS, WORK, OR FROM GETTING THINGS NEEDED FOR DAILY LIVING?: PATIENT DECLINED

## 2023-09-22 SDOH — ECONOMIC STABILITY: HOUSING INSECURITY: IN THE LAST 12 MONTHS, HOW MANY PLACES HAVE YOU LIVED?: 1

## 2023-09-22 SDOH — ECONOMIC STABILITY: TRANSPORTATION INSECURITY
IN THE PAST 12 MONTHS, HAS LACK OF RELIABLE TRANSPORTATION KEPT YOU FROM MEDICAL APPOINTMENTS, MEETINGS, WORK OR FROM GETTING THINGS NEEDED FOR DAILY LIVING?: PATIENT DECLINED

## 2023-09-22 ASSESSMENT — LIFESTYLE VARIABLES
AUDIT-C TOTAL SCORE: 1
HOW OFTEN DO YOU HAVE A DRINK CONTAINING ALCOHOL: MONTHLY OR LESS
SKIP TO QUESTIONS 9-10: 1
HOW OFTEN DO YOU HAVE SIX OR MORE DRINKS ON ONE OCCASION: NEVER
HOW MANY STANDARD DRINKS CONTAINING ALCOHOL DO YOU HAVE ON A TYPICAL DAY: 1 OR 2

## 2023-09-22 ASSESSMENT — SOCIAL DETERMINANTS OF HEALTH (SDOH)
HOW OFTEN DO YOU GET TOGETHER WITH FRIENDS OR RELATIVES?: MORE THAN THREE TIMES A WEEK
DO YOU BELONG TO ANY CLUBS OR ORGANIZATIONS SUCH AS CHURCH GROUPS UNIONS, FRATERNAL OR ATHLETIC GROUPS, OR SCHOOL GROUPS?: NO
HOW OFTEN DO YOU ATTEND CHURCH OR RELIGIOUS SERVICES?: PATIENT DECLINED
DO YOU BELONG TO ANY CLUBS OR ORGANIZATIONS SUCH AS CHURCH GROUPS UNIONS, FRATERNAL OR ATHLETIC GROUPS, OR SCHOOL GROUPS?: NO
HOW OFTEN DO YOU ATTENT MEETINGS OF THE CLUB OR ORGANIZATION YOU BELONG TO?: PATIENT DECLINED
HOW OFTEN DO YOU GET TOGETHER WITH FRIENDS OR RELATIVES?: MORE THAN THREE TIMES A WEEK
HOW OFTEN DO YOU ATTEND CHURCH OR RELIGIOUS SERVICES?: PATIENT DECLINED
HOW MANY DRINKS CONTAINING ALCOHOL DO YOU HAVE ON A TYPICAL DAY WHEN YOU ARE DRINKING: 1 OR 2
HOW OFTEN DO YOU ATTENT MEETINGS OF THE CLUB OR ORGANIZATION YOU BELONG TO?: PATIENT DECLINED
WITHIN THE PAST 12 MONTHS, YOU WORRIED THAT YOUR FOOD WOULD RUN OUT BEFORE YOU GOT THE MONEY TO BUY MORE: NEVER TRUE
HOW OFTEN DO YOU HAVE SIX OR MORE DRINKS ON ONE OCCASION: NEVER
IN A TYPICAL WEEK, HOW MANY TIMES DO YOU TALK ON THE PHONE WITH FAMILY, FRIENDS, OR NEIGHBORS?: MORE THAN THREE TIMES A WEEK
IN A TYPICAL WEEK, HOW MANY TIMES DO YOU TALK ON THE PHONE WITH FAMILY, FRIENDS, OR NEIGHBORS?: MORE THAN THREE TIMES A WEEK
HOW OFTEN DO YOU HAVE A DRINK CONTAINING ALCOHOL: MONTHLY OR LESS

## 2023-09-25 ENCOUNTER — HOSPITAL ENCOUNTER (OUTPATIENT)
Facility: MEDICAL CENTER | Age: 73
End: 2023-09-25
Attending: INTERNAL MEDICINE
Payer: MEDICARE

## 2023-09-25 ENCOUNTER — TELEPHONE (OUTPATIENT)
Dept: MEDICAL GROUP | Age: 73
End: 2023-09-25

## 2023-09-25 ENCOUNTER — HOSPITAL ENCOUNTER (OUTPATIENT)
Dept: LAB | Facility: MEDICAL CENTER | Age: 73
End: 2023-09-25
Attending: INTERNAL MEDICINE
Payer: MEDICARE

## 2023-09-25 ENCOUNTER — OFFICE VISIT (OUTPATIENT)
Dept: MEDICAL GROUP | Age: 73
End: 2023-09-25
Payer: MEDICARE

## 2023-09-25 VITALS
WEIGHT: 309 LBS | HEART RATE: 58 BPM | BODY MASS INDEX: 38.42 KG/M2 | TEMPERATURE: 97.5 F | HEIGHT: 75 IN | SYSTOLIC BLOOD PRESSURE: 134 MMHG | DIASTOLIC BLOOD PRESSURE: 74 MMHG | OXYGEN SATURATION: 97 %

## 2023-09-25 DIAGNOSIS — G47.33 OSA TREATED WITH BIPAP: ICD-10-CM

## 2023-09-25 DIAGNOSIS — N13.9 OBSTRUCTIVE UROPATHY: ICD-10-CM

## 2023-09-25 DIAGNOSIS — N40.1 BENIGN NODULAR PROSTATIC HYPERPLASIA WITH LOWER URINARY TRACT SYMPTOMS: ICD-10-CM

## 2023-09-25 DIAGNOSIS — I73.9 PERIPHERAL VASCULAR DISEASE, UNSPECIFIED (HCC): ICD-10-CM

## 2023-09-25 DIAGNOSIS — I34.0 MILD MITRAL REGURGITATION BY PRIOR ECHOCARDIOGRAM: ICD-10-CM

## 2023-09-25 DIAGNOSIS — D50.9 NORMOCYTIC HYPOCHROMIC ANEMIA: ICD-10-CM

## 2023-09-25 DIAGNOSIS — E55.9 VITAMIN D INSUFFICIENCY: ICD-10-CM

## 2023-09-25 DIAGNOSIS — N18.32 STAGE 3B CHRONIC KIDNEY DISEASE: ICD-10-CM

## 2023-09-25 DIAGNOSIS — Z98.84 GASTRIC BYPASS STATUS FOR OBESITY: ICD-10-CM

## 2023-09-25 DIAGNOSIS — E53.8 VITAMIN B 12 DEFICIENCY: ICD-10-CM

## 2023-09-25 DIAGNOSIS — Z00.00 MEDICARE ANNUAL WELLNESS VISIT, SUBSEQUENT: ICD-10-CM

## 2023-09-25 DIAGNOSIS — I35.1 MILD AORTIC INSUFFICIENCY: ICD-10-CM

## 2023-09-25 DIAGNOSIS — E61.1 IRON DEFICIENCY: ICD-10-CM

## 2023-09-25 DIAGNOSIS — E29.1 HYPOGONADISM MALE: ICD-10-CM

## 2023-09-25 DIAGNOSIS — Z23 NEED FOR VACCINATION: ICD-10-CM

## 2023-09-25 DIAGNOSIS — E55.9 VITAMIN D DEFICIENCY: ICD-10-CM

## 2023-09-25 DIAGNOSIS — E66.01 CLASS 3 SEVERE OBESITY DUE TO EXCESS CALORIES WITH SERIOUS COMORBIDITY IN ADULT, UNSPECIFIED BMI (HCC): ICD-10-CM

## 2023-09-25 DIAGNOSIS — R60.0 EDEMA OF BOTH LOWER LEGS: ICD-10-CM

## 2023-09-25 DIAGNOSIS — E66.9 OBESITY (BMI 30-39.9): ICD-10-CM

## 2023-09-25 DIAGNOSIS — Z98.890 S/P COLONOSCOPIC POLYPECTOMY: ICD-10-CM

## 2023-09-25 PROBLEM — N18.31 CHRONIC KIDNEY DISEASE, STAGE 3A: Status: ACTIVE | Noted: 2023-09-25

## 2023-09-25 LAB
APPEARANCE UR: CLEAR
BACTERIA #/AREA URNS HPF: NEGATIVE /HPF
BILIRUB UR QL STRIP.AUTO: NEGATIVE
COLOR UR: YELLOW
EPI CELLS #/AREA URNS HPF: NEGATIVE /HPF
GLUCOSE UR STRIP.AUTO-MCNC: NEGATIVE MG/DL
HGB RETIC QN AUTO: 32 PG/CELL (ref 29–35)
HYALINE CASTS #/AREA URNS LPF: ABNORMAL /LPF
IMM RETICS NFR: 5.6 % (ref 2.6–16.1)
KETONES UR STRIP.AUTO-MCNC: NEGATIVE MG/DL
LEUKOCYTE ESTERASE UR QL STRIP.AUTO: ABNORMAL
MICRO URNS: ABNORMAL
NITRITE UR QL STRIP.AUTO: NEGATIVE
PH UR STRIP.AUTO: 6 [PH] (ref 5–8)
PROT UR QL STRIP: NEGATIVE MG/DL
RBC # URNS HPF: ABNORMAL /HPF
RBC UR QL AUTO: NEGATIVE
RETICS # AUTO: 0.06 M/UL (ref 0.04–0.12)
RETICS/RBC NFR: 1.7 % (ref 0.8–2.6)
SP GR UR STRIP.AUTO: 1.01
UROBILINOGEN UR STRIP.AUTO-MCNC: 0.2 MG/DL
WBC #/AREA URNS HPF: ABNORMAL /HPF

## 2023-09-25 PROCEDURE — G0010 ADMIN HEPATITIS B VACCINE: HCPCS | Performed by: INTERNAL MEDICINE

## 2023-09-25 PROCEDURE — 82274 ASSAY TEST FOR BLOOD FECAL: CPT

## 2023-09-25 PROCEDURE — 3078F DIAST BP <80 MM HG: CPT | Performed by: INTERNAL MEDICINE

## 2023-09-25 PROCEDURE — 81001 URINALYSIS AUTO W/SCOPE: CPT

## 2023-09-25 PROCEDURE — 85046 RETICYTE/HGB CONCENTRATE: CPT

## 2023-09-25 PROCEDURE — 90746 HEPB VACCINE 3 DOSE ADULT IM: CPT | Performed by: INTERNAL MEDICINE

## 2023-09-25 PROCEDURE — 99214 OFFICE O/P EST MOD 30 MIN: CPT | Mod: 25 | Performed by: INTERNAL MEDICINE

## 2023-09-25 PROCEDURE — G0439 PPPS, SUBSEQ VISIT: HCPCS | Mod: 25 | Performed by: INTERNAL MEDICINE

## 2023-09-25 PROCEDURE — G0008 ADMIN INFLUENZA VIRUS VAC: HCPCS | Performed by: INTERNAL MEDICINE

## 2023-09-25 PROCEDURE — 36415 COLL VENOUS BLD VENIPUNCTURE: CPT

## 2023-09-25 PROCEDURE — 90662 IIV NO PRSV INCREASED AG IM: CPT | Performed by: INTERNAL MEDICINE

## 2023-09-25 PROCEDURE — 3075F SYST BP GE 130 - 139MM HG: CPT | Performed by: INTERNAL MEDICINE

## 2023-09-25 RX ORDER — FUROSEMIDE 40 MG/1
40 TABLET ORAL DAILY
Qty: 30 TABLET | Refills: 2 | Status: SHIPPED | OUTPATIENT
Start: 2023-09-25 | End: 2023-10-19

## 2023-09-25 RX ORDER — POTASSIUM CHLORIDE 20 MEQ/1
20 TABLET, EXTENDED RELEASE ORAL DAILY
Qty: 30 TABLET | Refills: 2 | Status: SHIPPED | OUTPATIENT
Start: 2023-09-25 | End: 2023-10-19

## 2023-09-25 ASSESSMENT — ENCOUNTER SYMPTOMS: GENERAL WELL-BEING: GOOD

## 2023-09-25 ASSESSMENT — FIBROSIS 4 INDEX: FIB4 SCORE: 1.4

## 2023-09-25 ASSESSMENT — PATIENT HEALTH QUESTIONNAIRE - PHQ9: CLINICAL INTERPRETATION OF PHQ2 SCORE: 0

## 2023-09-25 ASSESSMENT — ACTIVITIES OF DAILY LIVING (ADL): BATHING_REQUIRES_ASSISTANCE: 0

## 2023-09-25 NOTE — TELEPHONE ENCOUNTER
Patient left me a message stating he needs an alfie in three weeks can you call and schedule that . Thank you

## 2023-09-25 NOTE — PROGRESS NOTES
Chief Complaint   Patient presents with    Annual Exam     AWV lab review  right and left leg swelling    The patient is here for his annual wellness visit but also has massive leg edema which is developed over the last few months with a much worsening GFR from normal to 30 in the last several months.  He was seen by urology and found to have an elevated postvoid residual of over 800 and started on finasteride and has tamsulosin increased.  Ultrasound of kidneys was not done.  Urinalysis showed a lot of white cells but.  No casts    HPI:  Jhony Rodriguez is a 73 y.o. here for Medicare Annual Wellness Visit     Patient Active Problem List    Diagnosis Date Noted    Vitamin D insufficiency 06/15/2023    Mild mitral regurgitation by prior echocardiogram 05/31/2023    Mild aortic insufficiency 05/31/2023    Urinary tract infection without hematuria 05/31/2023    Grade I hemorrhoids 03/08/2023    Peripheral vascular disease, unspecified ([facility]) 02/13/2023    Obesity (BMI 30-39.9) 09/16/2021    Class 3 severe obesity in adult ([facility]) 03/12/2020    Dyslipidemia 03/12/2020    CARLOS treated with BiPAP 09/12/2019    Hypogonadism male 05/22/2019    Vitamin D deficiency 09/14/2017    Iron deficiency- due to gastric bypass 01/19/2017    Benign nodular prostatic hyperplasia with lower urinary tract symptoms 01/19/2017    Vitamin B 12 deficiency- due to gastric bypass 06/20/2016    Gastric bypass status for obesity-2005 dr gallo 11/19/2012    S/P RF ablation operation for arrhythmia-2008; no recurrence; no meds 12/10/2010       Current Outpatient Medications   Medication Sig Dispense Refill    finasteride (PROSCAR) 5 MG Tab Take 5 mg by mouth every day.      Cholecalciferol (DIALYVITE VITAMIN D 5000 PO) Take 5,000 Units by mouth every day.      tamsulosin (FLOMAX) 0.4 MG capsule Take 2 Capsules by mouth 1/2 hour after breakfast. 180 Capsule 3    Cyanocobalamin (B-12) 1000 MCG Tab CR Take 1 Tablet by mouth see administration  instructions. 2 times a week 100 Tablet 3    hydrocortisone 2.5 % Cream topical cream Apply 1 Application topically 2 times a day. 28 g 0     No current facility-administered medications for this visit.          Current supplements as per medication list.     Allergies: Meloxicam and Penicillins    Current social contact/activities: yes     He  reports that he has never smoked. He has never used smokeless tobacco. He reports current alcohol use of about 1.2 oz of alcohol per week. He reports that he does not use drugs.  Counseling given: Not Answered      ROS:    Gait: Uses no assistive device  Ostomy: No  Other tubes: No  Amputations: No  Chronic oxygen use: No  Last eye exam: 1 year   Wears hearing aids: No   : Denies any urinary leakage during the last 6 months    Screening:    Depression Screening  Little interest or pleasure in doing things?  0 - not at all  Feeling down, depressed , or hopeless? 0 - not at all  Patient Health Questionnaire Score: 0     If depressive symptoms identified deferred to follow up visit unless specifically addressed in assessment and plan.    Interpretation of PHQ-9 Total Score   Score Severity   1-4 No Depression   5-9 Mild Depression   10-14 Moderate Depression   15-19 Moderately Severe Depression   20-27 Severe Depression    Screening for Cognitive Impairment  Do you or any of your friends or family members have any concern about your memory? No  Three Minute Recall (Banana, Sunrise, Chair) 3/3    Jose clock face with all 12 numbers and set the hands to show 20 past 8.  Yes    Cognitive concerns identified deferred for follow up unless specifically addressed in assessment and plan.    Fall Risk Assessment  Has the patient had two or more falls in the last year or any fall with injury in the last year?  No    Safety Assessment  Do you always wear your seatbelt?  Yes  Any changes to home needed to function safely? No  Difficulty hearing.  No  Patient counseled about all safety  risks that were identified.    Functional Assessment ADLs  Are there any barriers preventing you from cooking for yourself or meeting nutritional needs?  No.    Are there any barriers preventing you from driving safely or obtaining transportation?  No.    Are there any barriers preventing you from using a telephone or calling for help?  No    Are there any barriers preventing you from shopping?  No.    Are there any barriers preventing you from taking care of your own finances?  No    Are there any barriers preventing you from managing your medications?  No    Are there any barriers preventing you from showering, bathing or dressing yourself? No    Are there any barriers preventing you from doing housework or laundry? No  Are there any barriers preventing you from using the toilet?No  Are you currently engaging in any exercise or physical activity?  Yes.      Self-Assessment of Health  What is your perception of your health? Good  Do you sleep more than six hours a night? Yes  In the past 7 days, how much did pain keep you from doing your normal work? None  Do you spend quality time with family or friends (virtually or in person)? Yes  Do you usually eat a heart healthy diet that constists of a variety of fruits, vegetables, whole grains and fiber? Yes  Do you eat foods high in fat and/or Fast Food more than three times per week? No    Advance Care Planning  Do you have an Advance Directive, Living Will, Durable Power of , or POLST? Yes  Advance Directive Living Will Durable Power of  POLST        Health Maintenance Summary            Overdue - COVID-19 Vaccine (2 - Pfizer series) Overdue since 1/27/2023 09/27/2022  Imm Admin: MODERNA BIVALENT BOOSTER SARS-COV-2 VACCINE (6+)    04/08/2022  Imm Admin: MODERNA SARS-COV-2 VACCINE (12+)    10/26/2021  Imm Admin: MODERNA SARS-COV-2 VACCINE (12+)    04/05/2021  Imm Admin: MODERNA SARS-COV-2 VACCINE (12+)    03/04/2021  Imm Admin: MODERNA SARS-COV-2  VACCINE (12+)              Ordered - Hepatitis B Vaccine (Hep B) (3 of 3 - 19+ 3-dose series) Ordered on 5/31/2023 03/22/2023  Imm Admin: Hepatitis B Vaccine (Adol/Adult)    10/25/2022  Imm Admin: Hepatitis B Vaccine (Adol/Adult)              Overdue - Influenza Vaccine (1) Overdue since 9/1/2023 09/06/2022  Imm Admin: Influenza, Unspecified - HISTORICAL DATA    10/07/2021  Imm Admin: Influenza, Unspecified - HISTORICAL DATA    09/21/2020  Imm Admin: Influenza Vaccine Adult HD    09/12/2019  Imm Admin: Influenza Vaccine Adult HD    10/08/2018  Imm Admin: Influenza Vaccine Adult HD    Only the first 5 history entries have been loaded, but more history exists.              Colorectal Cancer Screening (Colonoscopy - Every 5 Years) Tentatively due on 1/25/2024 01/25/2019  REFERRAL TO GI FOR COLONOSCOPY    06/22/2016  OCCULT BLOOD,FECAL,IMMUNOASSAY    11/21/2012  OCCULT BLOOD FECES IMMUNOASSAY    09/12/2008  REFERRAL TO GI FOR COLONOSCOPY              Annual Wellness Visit (Every 366 Days) Next due on 2/14/2024 02/13/2023  Level of Service: AL ANNUAL WELLNESS VISIT-INCLUDES PPPS SUBSEQUE*    02/08/2022  Level of Service: AL ANNUAL WELLNESS VISIT-INCLUDES PPPS SUBSEQUE*    09/16/2021  Done    09/16/2021  Visit Dx: Medicare annual wellness visit, initial    05/22/2019  Subsequent Annual Wellness Visit - Includes PPPS ()    Only the first 5 history entries have been loaded, but more history exists.              IMM DTaP/Tdap/Td Vaccine (2 - Td or Tdap) Next due on 3/14/2027      03/14/2017  Imm Admin: Tdap Vaccine              Pneumococcal Vaccine: 65+ Years (Series Information) Completed      10/08/2018  Imm Admin: Pneumococcal polysaccharide vaccine (PPSV-23)    09/14/2017  Imm Admin: Pneumococcal Conjugate Vaccine (Prevnar/PCV-13)              Zoster (Shingles) Vaccines (Series Information) Completed      08/13/2021  Imm Admin: Zoster Vaccine Recombinant (RZV) (SHINGRIX)    06/09/2021  Imm Admin:  Zoster Vaccine Recombinant (RZV) (SHINGRIX)              Hepatitis C Screening  Tentatively Complete      09/28/2021  Hepatitis C Antibody component of HEP C VIRUS ANTIBODY              Hepatitis A Vaccine (Hep A) (Series Information) Aged Out      No completion history exists for this topic.              HPV Vaccines (Series Information) Aged Out      No completion history exists for this topic.              Polio Vaccine (Inactivated Polio) (Series Information) Aged Out      No completion history exists for this topic.              Meningococcal Immunization (Series Information) Aged Out      No completion history exists for this topic.                    Patient Care Team:  Reinaldo Fenton M.D. as PCP - General (Internal Medicine)  Reinaldo Fenton M.D. as PCP - [facility] Paneled        Social History     Tobacco Use    Smoking status: Never    Smokeless tobacco: Never   Vaping Use    Vaping Use: Never used   Substance Use Topics    Alcohol use: Yes     Alcohol/week: 1.2 oz     Types: 1 Glasses of wine, 1 Cans of beer per week     Comment: one per month    Drug use: No     Family History   Problem Relation Age of Onset    Cancer Maternal Aunt     Cancer Maternal Uncle     Cancer Maternal Grandfather     Thyroid Mother     Heart Disease Mother     Heart Disease Brother     Sleep Apnea Neg Hx      He  has a past medical history of Amnesia, global, transient- 2008, resolved; no recurrence; possibly due to due to medication interaction- celebrex and mobic   (1/19/2017), Arthritis, Atrial fibrillation ([facility]) (2007), Bilateral leg edema (3/12/2020), BMI 40.0-44.9, adult ([facility]) (2/8/2022), H/O cardiac radiofrequency ablation (2006), Hip pain, right, History of atrial fibrillation (5/20/2020), History of obstructive sleep apnea- resolved with weight loss (6/20/2016), Obesity, Obesity (BMI 30-39.9) (2/13/2018), Primary osteoarthritis of right hip- THR tbd 5/2020-n dr barraza (5/23/2019), Prophylactic antibiotic- for dental work  "due to TKRs (9/14/2017), S/P total knee arthroplasty, bilateral (4/13/2020), Sleep apnea, and Snoring.   Past Surgical History:   Procedure Laterality Date    [place] TOTAL HIP ARTHROPLASTY Right 5/20/2020    Procedure: ARTHROPLASTY, HIP, TOTAL;  Surgeon: Connor Fonseca M.D.;  Location: SURGERY [facility];  Service: Orthopedics    KNEE ARTHROPLASTY TOTAL Left 5/5/2015    Procedure: KNEE ARTHROPLASTY TOTAL ;  Surgeon: Mike Michele M.D.;  Location: SURGERY [facility];  Service:     KNEE ARTHROPLASTY TOTAL  1/6/2015    Performed by Mike Michele M.D. at SURGERY [facility]    KNEE ARTHROPLASTY TOTAL Bilateral 2015    4 months apart    INGUINAL HERNIA REPAIR  8/16/2013    Performed by Martin Dillon M.D. at SURGERY [facility]    LAPAROTOMY  2006    bowel perforation repair, following gastric  bypass    KNEE ARTHROSCOPY Left 2006    left knee meniscectomy; dr Michele    ORIF, WRIST Right 2005    GASTRIC BYPASS LAPAROSCOPIC  2004    dr. gallo    ARTHROSCOPY, KNEE         Exam:   /74 (BP Location: Right arm, Patient Position: Sitting, BP Cuff Size: Adult)   Pulse (!) 58   Temp 36.4 °C (97.5 °F) (Temporal)   Ht 1.905 m (6' 3\")   Wt (!) 140 kg (309 lb)   SpO2 97%  Body mass index is 38.62 kg/m².    Hearing good.    Dentition good  Alert, oriented in no acute distress.  Eye contact is good, speech goal directed, affect calm    Assessment and Plan. The following treatment and monitoring plan is recommended:    1. Medicare annual wellness visit, subsequent  All metrics reviewed and updated.    2. Obstructive uropathy-postvoid residual greater than 800 at urology office  New problem.  Developing over the last several months.  Follow-up with urology.  Refer to nephrology to rule out primary renal disease since GFR is now decreased from normal to stage IIIb over the last year.    3. Stage 3b chronic kidney disease (HCC)  See above.  - Referral to Nephrology  - US-RENAL; Future  - " URINALYSIS,CULTURE IF INDICATED; Future  - URINALYSIS,CULTURE IF INDICATED; Future    4. Edema of both lower legs  Start diuresis.  Continue with intermittent cathing of the urethra for his obstruction.  May need more definitive treatment of his obstruction.  Follow-up with urology  - furosemide (LASIX) 40 MG Tab; Take 1 Tablet by mouth every day.  Dispense: 30 Tablet; Refill: 2  - potassium chloride SA (KDUR) 20 MEQ Tab CR; Take 1 Tablet by mouth every day.  Dispense: 30 Tablet; Refill: 2    5. Normocytic hypochromic anemia  This is stable.  Status post gastric bypass.  Continue iron supplements and B12 supplements  - RETICULOCYTES COUNT; Future  - OCCULT BLOOD FECES IMMUNOASSAY; Future    6. Obesity (BMI 30-39.9)  Mentoring diet and exercise.    7. Peripheral vascular disease, unspecified (HCC)  Stable without progression.  No claudication.  Continue with risk reduction and consider statin therapy.    8. Vitamin D insufficiency  Continue with vitamin D supplements 1000 is daily.    9. Iron deficiency- due to gastric bypass  As above    10. Vitamin B 12 deficiency- due to gastric bypass  As above    11. Gastric bypass status for obesity-2005 dr gallo  As above    12. Vitamin D deficiency  As above    13. Hypogonadism male  Stable.  Recheck levels.  Consider replacement therapy in future.    14. Class 3 severe obesity due to excess calories with serious comorbidity in adult, unspecified BMI (HCC)  Mediterranean diet and exercise    15. Mild mitral regurgitation by prior echocardiogram  Check echo.  No dose of any heart failure explain his edema    16. Mild aortic insufficiency  Stable.  No murmur heard.    17. CARLOS treated with BiPAP  Continue BiPAP.  Stable.    18. Benign nodular prostatic hyperplasia with lower urinary tract symptoms  As above    19. S/P colonoscopic polypectomy      - Referral to Gastroenterology    20. Need for vaccination     - Influenza Vaccine, High Dose (65+ Only)         Services suggested:  No services needed at this time  Health Care Screening: Age-appropriate preventive services recommended by USPTF and ACIP covered by Medicare were discussed today. Services ordered if indicated and agreed upon by the patient.  Referrals offered: Community-based lifestyle interventions to reduce health risks and promote self-management and wellness, fall prevention, nutrition, physical activity, tobacco-use cessation, weight loss, and mental health services as per orders if indicated.    Discussion today about general wellness and lifestyle habits:    Prevent falls and reduce trip hazards; Cautioned about securing or removing rugs.  Have a working fire alarm and carbon monoxide detector;   Engage in regular physical activity and social activities     Follow-up: No follow-ups on file.

## 2023-09-26 ENCOUNTER — OFFICE VISIT (OUTPATIENT)
Dept: NEPHROLOGY | Facility: MEDICAL CENTER | Age: 73
End: 2023-09-26
Payer: MEDICARE

## 2023-09-26 VITALS
BODY MASS INDEX: 36.25 KG/M2 | DIASTOLIC BLOOD PRESSURE: 72 MMHG | HEART RATE: 57 BPM | HEIGHT: 77 IN | WEIGHT: 307 LBS | TEMPERATURE: 98.4 F | SYSTOLIC BLOOD PRESSURE: 132 MMHG | OXYGEN SATURATION: 97 %

## 2023-09-26 DIAGNOSIS — N18.32 STAGE 3B CHRONIC KIDNEY DISEASE: ICD-10-CM

## 2023-09-26 DIAGNOSIS — D50.9 NORMOCYTIC HYPOCHROMIC ANEMIA: ICD-10-CM

## 2023-09-26 PROCEDURE — 99214 OFFICE O/P EST MOD 30 MIN: CPT | Performed by: INTERNAL MEDICINE

## 2023-09-26 PROCEDURE — 3075F SYST BP GE 130 - 139MM HG: CPT | Performed by: INTERNAL MEDICINE

## 2023-09-26 PROCEDURE — 3078F DIAST BP <80 MM HG: CPT | Performed by: INTERNAL MEDICINE

## 2023-09-26 ASSESSMENT — FIBROSIS 4 INDEX: FIB4 SCORE: 1.4

## 2023-09-26 NOTE — PROGRESS NOTES
NEPHROLOGY HISTORY AND PHYSICAL CLINIC NOTE            HPI:  Jhony Rodriguez is a 73 y.o. male who presents today for evaluation elevated creatinine.    Patient with notable elevated creatinine to 1.55 1.97 over the past 6 months.  March 2023 demonstrated normal creatinine notably 0.9.    Patient has been seen by urology and found to have enlarged prostate.  Have been recommendations of treatment to include intermittent catheterization.  Patient has not yet started and is competent contemplating long-term treatment course.  He remains on finasteride and Flomax.    He denies NSAID use at this time.    Notably patient has reported weight gain and swelling significantly in bilateral lower extremities now inclusive of his thighs.  Bilaterally.  Patient has been started on Lasix 40 mg once daily he has not been on diuresis in the past.  Notably echo demonstrated EF of 60%      Otherwise in his normal state of health.        Past medical history  Obesity  Atrial fibrillation  Osteoarthritis of the knees and hips bilaterally.  Obstructive sleep apnea       Social history  Denies smoking. alcohol and illicit drug use.   for company.  , football      Family history  Maternal uncle had kidney cancer    Outpatient Encounter Medications as of 9/26/2023   Medication Sig Dispense Refill    furosemide (LASIX) 40 MG Tab Take 1 Tablet by mouth every day. 30 Tablet 2    potassium chloride SA (KDUR) 20 MEQ Tab CR Take 1 Tablet by mouth every day. 30 Tablet 2    finasteride (PROSCAR) 5 MG Tab Take 5 mg by mouth every day.      Cholecalciferol (DIALYVITE VITAMIN D 5000 PO) Take 5,000 Units by mouth every day.      tamsulosin (FLOMAX) 0.4 MG capsule Take 2 Capsules by mouth 1/2 hour after breakfast. 180 Capsule 3    Cyanocobalamin (B-12) 1000 MCG Tab CR Take 1 Tablet by mouth see administration instructions. 2 times a week 100 Tablet 3     No facility-administered encounter medications on file as of  "9/26/2023.        Allergies   Allergen Reactions    Meloxicam      alterred mentation    Penicillins Rash and Swelling     Rash  Tolerated cefazolin 5/5/15       ROS    /72 (BP Location: Right arm, Patient Position: Sitting, BP Cuff Size: Adult)   Pulse (!) 57   Temp 36.9 °C (98.4 °F) (Temporal)   Ht 1.943 m (6' 4.5\")   Wt (!) 139 kg (307 lb)   SpO2 97%   BMI 36.88 kg/m²     Physical Exam  GEN: alert and oriented. In no acute distress.   HEENT: moist oropharyngeal mucous membranes  CV:RRR  PULM: clear to auscultation bilaterally  ABD: soft non tender non distended  EXT: warm well perfused, no lower extremity edema.    Labs reviewed.  Recent Labs     03/16/23  0606 05/31/23  0937 06/29/23  0910 09/21/23  0644   ALBUMIN 3.7 4.0 4.0 3.7   HDL 60 61  --  51   TRIGLYCERIDE 58 60  --  59   SODIUM 140 142 141 143   POTASSIUM 4.6 5.6* 5.3 4.8   CHLORIDE 108 112 107 113*   CO2 23 21 23 20   BUN 20 25* 27* 30*   CREATININE 0.90 1.92* 1.55* 1.97*       Lab Results   Component Value Date/Time    WBC 4.8 09/21/2023 06:44 AM    WBC 5.8 12/06/2010 08:03 AM    RBC 3.57 (L) 09/21/2023 06:44 AM    RBC 4.97 12/06/2010 08:03 AM    HEMOGLOBIN 10.5 (L) 09/21/2023 06:44 AM    HEMATOCRIT 33.7 (L) 09/21/2023 06:44 AM    MCV 94.4 09/21/2023 06:44 AM    MCV 85 12/06/2010 08:03 AM    MCH 29.4 09/21/2023 06:44 AM    MCH 28.0 12/06/2010 08:03 AM    MCHC 31.2 (L) 09/21/2023 06:44 AM    MPV 11.0 09/21/2023 06:44 AM              URINALYSIS:  Lab Results   Component Value Date/Time    COLORURINE Yellow 09/25/2023 0905    CLARITY Clear 09/25/2023 0905    SPECGRAVITY 1.008 09/25/2023 0905    PHURINE 6.0 09/25/2023 0905    KETONES Negative 09/25/2023 0905    PROTEINURIN Negative 09/25/2023 0905    BILIRUBINUR Negative 09/25/2023 0905    UROBILU 0.2 09/25/2023 0905    NITRITE Negative 09/25/2023 0905    LEUKESTERAS Trace (A) 09/25/2023 0905    OCCULTBLOOD Negative 09/25/2023 0905     UPC  No results found for: \"TOTPROTUR\" No results found " "for: \"CREATININEU\"    Imaging report(s) reviewed  No orders to display     ECHO   The left ventricle is normal in size and thickness.  Normal left   ventricular systolic function. The left ventricular ejection fraction   is visually estimated to be 60%. Normal regional wall motion. Normal   diastolic function.    Assessment:  Jhony Rodriguez is a 73 y.o. male benign prostatic hypertrophy, hypertension who presents with elevated creatinine concerning for chronic kidney disease.      CKD due to Obstructive Uropathy.  -Urinalysis bland.  Trace leukocyte Estrace.  0-2 white cells, 0-2 red blood cells.  Gated for bacteria negative for protein.  - Await renal ultrasound.  -Routine management of hypertension  - Dose all medications for patient level of renal function  - Avoid nephrotoxic agents including contrast and NSAIDs  - Encourage adequate hydration.  -Continue diuresis lasix 40 mg.   - Continue to monitor renal function.  Obtain BMP, urine studies including urine protein quantification.    2.  BPH -continue Flomax.  Continue finasteride.  PSA  within normal limits. Follow up with urology.     Return to clinic in 2 months.      Alberta Chu MD  Nephrology  [facility]      "

## 2023-09-27 LAB — IMM ASSAY OCC BLD FITOB: NEGATIVE

## 2023-10-03 ENCOUNTER — HOSPITAL ENCOUNTER (OUTPATIENT)
Dept: RADIOLOGY | Facility: MEDICAL CENTER | Age: 73
End: 2023-10-03
Attending: INTERNAL MEDICINE
Payer: MEDICARE

## 2023-10-03 DIAGNOSIS — N18.32 STAGE 3B CHRONIC KIDNEY DISEASE: ICD-10-CM

## 2023-10-03 PROCEDURE — 76775 US EXAM ABDO BACK WALL LIM: CPT

## 2023-10-17 ENCOUNTER — OFFICE VISIT (OUTPATIENT)
Dept: MEDICAL GROUP | Age: 73
End: 2023-10-17
Payer: MEDICARE

## 2023-10-17 VITALS
HEART RATE: 52 BPM | TEMPERATURE: 97 F | SYSTOLIC BLOOD PRESSURE: 122 MMHG | HEIGHT: 76 IN | BODY MASS INDEX: 35.92 KG/M2 | WEIGHT: 295 LBS | DIASTOLIC BLOOD PRESSURE: 60 MMHG | RESPIRATION RATE: 16 BRPM | OXYGEN SATURATION: 97 %

## 2023-10-17 DIAGNOSIS — R60.0 EDEMA OF BOTH LOWER LEGS: ICD-10-CM

## 2023-10-17 DIAGNOSIS — N40.1 BENIGN NODULAR PROSTATIC HYPERPLASIA WITH LOWER URINARY TRACT SYMPTOMS: ICD-10-CM

## 2023-10-17 DIAGNOSIS — N18.31 CHRONIC KIDNEY DISEASE, STAGE 3A: ICD-10-CM

## 2023-10-17 PROCEDURE — 3074F SYST BP LT 130 MM HG: CPT | Performed by: PHYSICIAN ASSISTANT

## 2023-10-17 PROCEDURE — 99214 OFFICE O/P EST MOD 30 MIN: CPT | Performed by: PHYSICIAN ASSISTANT

## 2023-10-17 PROCEDURE — 3078F DIAST BP <80 MM HG: CPT | Performed by: PHYSICIAN ASSISTANT

## 2023-10-17 ASSESSMENT — FIBROSIS 4 INDEX: FIB4 SCORE: 1.4

## 2023-10-17 NOTE — PROGRESS NOTES
cc: Lab review, follow-up lower extremity edema    Subjective:     HPI  PCP-Dr. Fenton, unable to see today.  Jhony Rodriguez is a 73 y.o. male presenting for medication check and lab review.  He was seen by his PCP approximately 3 weeks ago, was noted to have increased bilateral lower extremity edema, worsening of renal function, he is currently followed by urology, thought that CKD is due to obstructive uropathy.  He was seen by nephrology 9/26, no change in medications, awaiting renal ultrasound results.  Did have echo completed, ejection fraction 60%.  Is currently taking 0.8 mg of Flomax in addition to 5 mg of finasteride.  Has noted improvement in BPH symptoms.  He was started on 40 mg of Lasix.  His dropped 14 pounds since his last visit.  He has noted good improvement in the lower extremity edema, was up to his thighs, now just below the knee on the left, to the floor portion of the shin on the right.  Overall significant improvement.        Review of systems:  See above.       Current Outpatient Medications:     furosemide (LASIX) 40 MG Tab, Take 1 Tablet by mouth every day., Disp: 30 Tablet, Rfl: 2    potassium chloride SA (KDUR) 20 MEQ Tab CR, Take 1 Tablet by mouth every day., Disp: 30 Tablet, Rfl: 2    finasteride (PROSCAR) 5 MG Tab, Take 5 mg by mouth every day., Disp: , Rfl:     Cholecalciferol (DIALYVITE VITAMIN D 5000 PO), Take 5,000 Units by mouth every day., Disp: , Rfl:     tamsulosin (FLOMAX) 0.4 MG capsule, Take 2 Capsules by mouth 1/2 hour after breakfast., Disp: 180 Capsule, Rfl: 3    Cyanocobalamin (B-12) 1000 MCG Tab CR, Take 1 Tablet by mouth see administration instructions. 2 times a week, Disp: 100 Tablet, Rfl: 3    Allergies, past medical history, past surgical history, family history, social history reviewed and updated    Objective:     Vitals: /60 (BP Location: Left arm, Patient Position: Sitting, BP Cuff Size: Adult long)   Pulse (!) 52   Temp 36.1 °C (97 °F)  "(Temporal)   Resp 16   Ht 1.93 m (6' 4\")   Wt (!) 134 kg (295 lb)   SpO2 97%   BMI 35.91 kg/m²   General: Alert, pleasant, NAD  HEENT: Normocephalic. Neck supple. No carotid bruits   Heart: Regular rate and rhythm.  S1 and S2 normal.  No murmurs appreciated.  Respiratory: Normal respiratory effort.  Clear to auscultation bilaterally.  Skin: Warm, dry, no rashes.  Extremities: 2+ pitting edema just inferior to the left knee, 2+ pitting edema on the distal portion of the right shin.  Psych:  Affect/mood is normal, judgement is good, memory is intact, grooming is appropriate.    Assessment/Plan:     Jhony was seen today for lab results.    Diagnoses and all orders for this visit:    Benign nodular prostatic hyperplasia with lower urinary tract symptoms  Improving.  Has not scheduled follow-up appointment with urology yet.  Advised to call and schedule.  Continue 0.8 mg of Flomax, in addition to 5 mg of finasteride.    Chronic kidney disease, stage 3a (HCC)  -Attributed to obstructive uropathy.  Currently being monitored by nephrology.  Follow-up as recommended.  Does have labs ordered by nephrology, advised to have these completed prior to his appointment.    Edema of both lower legs  Significant improvement.  Has lost 14 pounds since his last visit.  Continue with 40 mg of Lasix, 20 mg of potassium.  Did advise to start weighing himself daily.  If he has more than a 3-4 pound weight gain in a day, advised to double up to Lasix twice daily.      Return in about 2 months (around 12/17/2023) for Medication Check w/pcp.  "

## 2023-10-19 DIAGNOSIS — R60.0 EDEMA OF BOTH LOWER LEGS: ICD-10-CM

## 2023-10-19 RX ORDER — FUROSEMIDE 40 MG/1
40 TABLET ORAL DAILY
Qty: 100 TABLET | Refills: 1 | Status: SHIPPED | OUTPATIENT
Start: 2023-10-19

## 2023-10-19 RX ORDER — POTASSIUM CHLORIDE 20 MEQ/1
20 TABLET, EXTENDED RELEASE ORAL DAILY
Qty: 100 TABLET | Refills: 1 | Status: SHIPPED | OUTPATIENT
Start: 2023-10-19

## 2023-10-19 NOTE — TELEPHONE ENCOUNTER
Received request via: Patient    Was the patient seen in the last year in this department? Yes    Does the patient have an active prescription (recently filled or refills available) for medication(s) requested? No    Does the patient have assisted Plus and need 100 day supply (blood pressure, diabetes and cholesterol meds only)? Yes, quantity updated to 100 days

## 2023-11-20 ENCOUNTER — HOSPITAL ENCOUNTER (OUTPATIENT)
Dept: LAB | Facility: MEDICAL CENTER | Age: 73
End: 2023-11-20
Attending: INTERNAL MEDICINE
Payer: MEDICARE

## 2023-11-20 DIAGNOSIS — N18.32 STAGE 3B CHRONIC KIDNEY DISEASE: ICD-10-CM

## 2023-11-20 LAB
ANION GAP SERPL CALC-SCNC: 12 MMOL/L (ref 7–16)
APPEARANCE UR: CLEAR
BACTERIA #/AREA URNS HPF: NEGATIVE /HPF
BILIRUB UR QL STRIP.AUTO: NEGATIVE
BUN SERPL-MCNC: 45 MG/DL (ref 8–22)
CALCIUM SERPL-MCNC: 8.7 MG/DL (ref 8.5–10.5)
CHLORIDE SERPL-SCNC: 109 MMOL/L (ref 96–112)
CO2 SERPL-SCNC: 20 MMOL/L (ref 20–33)
COLOR UR: YELLOW
CREAT SERPL-MCNC: 2.41 MG/DL (ref 0.5–1.4)
EPI CELLS #/AREA URNS HPF: NEGATIVE /HPF
ERYTHROCYTE [DISTWIDTH] IN BLOOD BY AUTOMATED COUNT: 46.7 FL (ref 35.9–50)
GFR SERPLBLD CREATININE-BSD FMLA CKD-EPI: 28 ML/MIN/1.73 M 2
GLUCOSE SERPL-MCNC: 92 MG/DL (ref 65–99)
GLUCOSE UR STRIP.AUTO-MCNC: NEGATIVE MG/DL
HCT VFR BLD AUTO: 33.4 % (ref 42–52)
HGB BLD-MCNC: 10.4 G/DL (ref 14–18)
HYALINE CASTS #/AREA URNS LPF: ABNORMAL /LPF
KETONES UR STRIP.AUTO-MCNC: NEGATIVE MG/DL
LEUKOCYTE ESTERASE UR QL STRIP.AUTO: ABNORMAL
MCH RBC QN AUTO: 29.5 PG (ref 27–33)
MCHC RBC AUTO-ENTMCNC: 31.1 G/DL (ref 32.3–36.5)
MCV RBC AUTO: 94.9 FL (ref 81.4–97.8)
MICRO URNS: ABNORMAL
NITRITE UR QL STRIP.AUTO: NEGATIVE
PH UR STRIP.AUTO: 6 [PH] (ref 5–8)
PLATELET # BLD AUTO: 247 K/UL (ref 164–446)
PMV BLD AUTO: 11 FL (ref 9–12.9)
POTASSIUM SERPL-SCNC: 5.6 MMOL/L (ref 3.6–5.5)
PROT UR QL STRIP: NEGATIVE MG/DL
PTH-INTACT SERPL-MCNC: 211 PG/ML (ref 14–72)
RBC # BLD AUTO: 3.52 M/UL (ref 4.7–6.1)
RBC # URNS HPF: ABNORMAL /HPF
RBC UR QL AUTO: NEGATIVE
SODIUM SERPL-SCNC: 141 MMOL/L (ref 135–145)
SP GR UR STRIP.AUTO: 1.01
UROBILINOGEN UR STRIP.AUTO-MCNC: 0.2 MG/DL
WBC # BLD AUTO: 6.5 K/UL (ref 4.8–10.8)
WBC #/AREA URNS HPF: ABNORMAL /HPF

## 2023-11-20 PROCEDURE — 85027 COMPLETE CBC AUTOMATED: CPT

## 2023-11-20 PROCEDURE — 36415 COLL VENOUS BLD VENIPUNCTURE: CPT

## 2023-11-20 PROCEDURE — 83970 ASSAY OF PARATHORMONE: CPT

## 2023-11-20 PROCEDURE — 80048 BASIC METABOLIC PNL TOTAL CA: CPT

## 2023-11-20 PROCEDURE — 84156 ASSAY OF PROTEIN URINE: CPT

## 2023-11-20 PROCEDURE — 81001 URINALYSIS AUTO W/SCOPE: CPT

## 2023-11-20 PROCEDURE — 82570 ASSAY OF URINE CREATININE: CPT

## 2023-11-21 ENCOUNTER — OFFICE VISIT (OUTPATIENT)
Dept: NEPHROLOGY | Facility: MEDICAL CENTER | Age: 73
End: 2023-11-21
Payer: MEDICARE

## 2023-11-21 VITALS
HEIGHT: 76 IN | WEIGHT: 301 LBS | HEART RATE: 64 BPM | TEMPERATURE: 98.4 F | DIASTOLIC BLOOD PRESSURE: 82 MMHG | OXYGEN SATURATION: 99 % | SYSTOLIC BLOOD PRESSURE: 128 MMHG | BODY MASS INDEX: 36.65 KG/M2

## 2023-11-21 DIAGNOSIS — N18.32 STAGE 3B CHRONIC KIDNEY DISEASE: ICD-10-CM

## 2023-11-21 LAB
CREAT UR-MCNC: 42.73 MG/DL
PROT UR-MCNC: <4 MG/DL (ref 0–15)
PROT/CREAT UR: NORMAL MG/G (ref 15–68)

## 2023-11-21 PROCEDURE — 3074F SYST BP LT 130 MM HG: CPT | Performed by: INTERNAL MEDICINE

## 2023-11-21 PROCEDURE — 99214 OFFICE O/P EST MOD 30 MIN: CPT | Performed by: INTERNAL MEDICINE

## 2023-11-21 PROCEDURE — 3079F DIAST BP 80-89 MM HG: CPT | Performed by: INTERNAL MEDICINE

## 2023-11-21 ASSESSMENT — FIBROSIS 4 INDEX: FIB4 SCORE: 1.32

## 2023-11-21 NOTE — PROGRESS NOTES
NEPHROLOGY PROGRESS NOTE            HPI:  Jhony Rodriguez is a 73 y.o. male with atrial fibrillation, obesity, obstructive uropathy and associated CKD who presents for follow-up.    Patient establish care in September 2023.  Kidney disease thought to be related to obstructive causes.    Patient remains on tamsulosin and finasteride.  Renal ultrasound on 10/3/2023 demonstrated bilateral hydronephrosis with trabeculated bladder wall.      He is otherwise in his normal state of health.  Denies chest pain shortness of breath.  Continues to have urinary complaints urgency and frequency.  Patient seen by urology on 10/24/2023 and recommends resuming intermittent catheterizations with plan for cystoscopy.  Past Medical History:   Diagnosis Date    Amnesia, global, transient- 2008, resolved; no recurrence; possibly due to due to medication interaction- celebrex and mobic   1/19/2017    IMO load March 2020    Arthritis     osteo knees, hips    Atrial fibrillation (HCC) 2007    resolved with Ablation; [facility]    Bilateral leg edema 3/12/2020    BMI 40.0-44.9, adult (HCC) 2/8/2022    H/O cardiac radiofrequency ablation 2006    Hip pain, right     History of atrial fibrillation 5/20/2020    History of obstructive sleep apnea- resolved with weight loss 6/20/2016    Obesity     Obesity (BMI 30-39.9) 2/13/2018    Primary osteoarthritis of right hip- THR tbd 5/2020-n dr barraza 5/23/2019    Prophylactic antibiotic- for dental work due to TKRs 9/14/2017    S/P total knee arthroplasty, bilateral 4/13/2020    Sleep apnea     BIPAP    Snoring     has had sleep study no cpap       Outpatient Encounter Medications as of 11/21/2023   Medication Sig Dispense Refill    potassium chloride SA (KDUR) 20 MEQ Tab CR TAKE 1 TABLET BY MOUTH EVERY  Tablet 1    furosemide (LASIX) 40 MG Tab TAKE 1 TABLET BY MOUTH EVERY  Tablet 1    finasteride (PROSCAR) 5 MG Tab Take 5 mg by mouth every day.      Cholecalciferol  "(DIALYVITE VITAMIN D 5000 PO) Take 5,000 Units by mouth every day.      tamsulosin (FLOMAX) 0.4 MG capsule Take 2 Capsules by mouth 1/2 hour after breakfast. 180 Capsule 3    Cyanocobalamin (B-12) 1000 MCG Tab CR Take 1 Tablet by mouth see administration instructions. 2 times a week 100 Tablet 3     No facility-administered encounter medications on file as of 11/21/2023.        Allergies   Allergen Reactions    Meloxicam      alterred mentation    Penicillins Rash and Swelling     Rash  Tolerated cefazolin 5/5/15       ROS    /82 (BP Location: Right arm, Patient Position: Sitting)   Pulse 64   Temp 36.9 °C (98.4 °F) (Temporal)   Ht 1.93 m (6' 4\")   Wt (!) 137 kg (301 lb)   SpO2 99%   BMI 36.64 kg/m²     Physical Exam  GEN: alert and oriented. In no acute distress.   HEENT: moist oropharyngeal mucous membranes  CV:RRR  PULM: clear to auscultation bilaterally  ABD: soft non tender non distended  EXT: warm well perfused, no lower extremity edema.    Labs reviewed.  Recent Labs     03/16/23  0606 05/31/23  0937 06/29/23  0910 09/21/23  0644 11/20/23  0638   ALBUMIN 3.7 4.0 4.0 3.7  --    HDL 60 61  --  51  --    TRIGLYCERIDE 58 60  --  59  --    SODIUM 140 142 141 143 141   POTASSIUM 4.6 5.6* 5.3 4.8 5.6*   CHLORIDE 108 112 107 113* 109   CO2 23 21 23 20 20   BUN 20 25* 27* 30* 45*   CREATININE 0.90 1.92* 1.55* 1.97* 2.41*       Lab Results   Component Value Date/Time    WBC 6.5 11/20/2023 06:38 AM    WBC 5.8 12/06/2010 08:03 AM    RBC 3.52 (L) 11/20/2023 06:38 AM    RBC 4.97 12/06/2010 08:03 AM    HEMOGLOBIN 10.4 (L) 11/20/2023 06:38 AM    HEMATOCRIT 33.4 (L) 11/20/2023 06:38 AM    MCV 94.9 11/20/2023 06:38 AM    MCV 85 12/06/2010 08:03 AM    MCH 29.5 11/20/2023 06:38 AM    MCH 28.0 12/06/2010 08:03 AM    MCHC 31.1 (L) 11/20/2023 06:38 AM    MPV 11.0 11/20/2023 06:38 AM      Recent Labs     11/20/23  0638   WBC 6.5   RBC 3.52*   HEMOGLOBIN 10.4*   HEMATOCRIT 33.4*   MCV 94.9   MCH 29.5   MCHC 31.1*   RDW " 46.7   PLATELETCT 247   MPV 11.0     Recent Labs     11/20/23  0638   SODIUM 141   POTASSIUM 5.6*   CHLORIDE 109   CO2 20   GLUCOSE 92   BUN 45*   CREATININE 2.41*   CALCIUM 8.7     URINALYSIS:  Lab Results   Component Value Date/Time    COLORURINE Yellow 11/20/2023 0639    CLARITY Clear 11/20/2023 0639    SPECGRAVITY 1.009 11/20/2023 0639    PHURINE 6.0 11/20/2023 0639    KETONES Negative 11/20/2023 0639    PROTEINURIN Negative 11/20/2023 0639    BILIRUBINUR Negative 11/20/2023 0639    UROBILU 0.2 11/20/2023 0639    NITRITE Negative 11/20/2023 0639    LEUKESTERAS Small (A) 11/20/2023 0639    OCCULTBLOOD Negative 11/20/2023 0639     UPC  Lab Results   Component Value Date/Time    TOTPROTUR <4.0 11/20/2023 0639      Lab Results   Component Value Date/Time    CREATININEU 42.73 11/20/2023 0639       Imaging report(s) reviewed  No orders to display         Assessment:  Jhony Rodriguez is a 73 y.o. male who presents todayevaluat dilation of chronic kidney disease due to obstructive uropathy.  Yoseph  CKD due to Obstructive Uropathy. -Aggressive  -Urinalysis bland.  Trace leukocyte Estrace.  0-2 white cells, 0-2 red blood cells.  Gated for bacteria negative for protein.  - Renal ultrasound on 10/3/2023 demonstrated moderate bilateral hydronephrosis with trabeculated urinary bladder wall.  Significant postvoid residual identified.  -Significant proteinuria identified.  -Routine management of hypertension  - Dose all medications for patient level of renal function  - Avoid nephrotoxic agents including contrast and NSAIDs  - Encourage adequate hydration.  -Continue diuresis lasix 40 mg. Add evening dose of 20 lasix mg for 5 days  - Continue to monitor renal function.  Obtain BMP, urine studies including urine protein quantification.     2.  BPH -continue Flomax.  Continue finasteride.  PSA  within normal limits. Follow up with urology.  Continue intermittent catheterizations as well as planned cystoscopy.     3.   Hyperkalemia potassium noted to be 5.6.  Likely due to kidney dysfunction.  Continue diuresis.  Recommend continued observation.  Return to clinic in 2 months.         Alberta Chu MD  Nephrology  [facility]

## 2023-11-21 NOTE — PATIENT INSTRUCTIONS
It has been a pleasure seeing you Mr. Rodriguez    I recommend you continue intermittent catheterization to support your kidneys.    Continue your 40 mg of Lasix daily.  For the next 5 days add an additional 20 mg or half tablet of Lasix in the evening.  After 5 days you may resume 40 mg of Lasix once daily.    See you soon      Dr. Alberta Chu MD

## 2023-12-18 ENCOUNTER — TELEPHONE (OUTPATIENT)
Dept: HEALTH INFORMATION MANAGEMENT | Facility: OTHER | Age: 73
End: 2023-12-18
Payer: MEDICARE

## 2023-12-19 ENCOUNTER — OFFICE VISIT (OUTPATIENT)
Dept: MEDICAL GROUP | Age: 73
End: 2023-12-19
Payer: MEDICARE

## 2023-12-19 VITALS
OXYGEN SATURATION: 97 % | TEMPERATURE: 97 F | BODY MASS INDEX: 36.17 KG/M2 | DIASTOLIC BLOOD PRESSURE: 70 MMHG | WEIGHT: 297 LBS | SYSTOLIC BLOOD PRESSURE: 146 MMHG | HEART RATE: 54 BPM | RESPIRATION RATE: 16 BRPM | HEIGHT: 76 IN

## 2023-12-19 DIAGNOSIS — R03.0 ELEVATED BLOOD PRESSURE READING WITHOUT DIAGNOSIS OF HYPERTENSION: ICD-10-CM

## 2023-12-19 DIAGNOSIS — N13.9 OBSTRUCTIVE UROPATHY: ICD-10-CM

## 2023-12-19 DIAGNOSIS — N18.32 STAGE 3B CHRONIC KIDNEY DISEASE: ICD-10-CM

## 2023-12-19 DIAGNOSIS — R60.0 EDEMA OF BOTH LOWER LEGS: ICD-10-CM

## 2023-12-19 PROBLEM — N18.31 CHRONIC KIDNEY DISEASE, STAGE 3A: Status: RESOLVED | Noted: 2023-09-25 | Resolved: 2023-12-19

## 2023-12-19 PROCEDURE — 99214 OFFICE O/P EST MOD 30 MIN: CPT | Performed by: PHYSICIAN ASSISTANT

## 2023-12-19 PROCEDURE — 3078F DIAST BP <80 MM HG: CPT | Performed by: PHYSICIAN ASSISTANT

## 2023-12-19 PROCEDURE — 3077F SYST BP >= 140 MM HG: CPT | Performed by: PHYSICIAN ASSISTANT

## 2023-12-19 RX ORDER — SILDENAFIL CITRATE 20 MG/1
20 TABLET ORAL
COMMUNITY

## 2023-12-19 ASSESSMENT — FIBROSIS 4 INDEX: FIB4 SCORE: 1.32

## 2023-12-19 NOTE — PROGRESS NOTES
"cc: Follow-up obstructive uropathy    Subjective:     HPI  Jhony Rodriguez is a 73 y.o. male presenting for follow-up obstructive uropathy.  He is currently followed by urology, it is thought that his CKD is due to obstructive uropathy.  He is followed by nephrology, seen 11/21, no change in medications.  Renal function is low, but has remained stable.  He is currently taking 40 mg of Lasix in addition to potassium.  For the BPH with outflow obstruction is taking 20 mg of Flomax in addition to 5 mg of finasteride.  He is scheduled for TURP the beginning of January.  He does still note some lower extremity edema, but it is stable.          Review of systems:  See above.       Current Outpatient Medications:     potassium chloride SA (KDUR) 20 MEQ Tab CR, TAKE 1 TABLET BY MOUTH EVERY DAY, Disp: 100 Tablet, Rfl: 1    furosemide (LASIX) 40 MG Tab, TAKE 1 TABLET BY MOUTH EVERY DAY, Disp: 100 Tablet, Rfl: 1    finasteride (PROSCAR) 5 MG Tab, Take 5 mg by mouth every day., Disp: , Rfl:     Cholecalciferol (DIALYVITE VITAMIN D 5000 PO), Take 5,000 Units by mouth every day., Disp: , Rfl:     tamsulosin (FLOMAX) 0.4 MG capsule, Take 2 Capsules by mouth 1/2 hour after breakfast., Disp: 180 Capsule, Rfl: 3    Cyanocobalamin (B-12) 1000 MCG Tab CR, Take 1 Tablet by mouth see administration instructions. 2 times a week, Disp: 100 Tablet, Rfl: 3    sildenafil (REVATIO) 20 MG tablet, Take 20 mg by mouth., Disp: , Rfl:     Allergies, past medical history, past surgical history, family history, social history reviewed and updated    Objective:     Vitals: BP (!) 146/70   Pulse (!) 54   Temp 36.1 °C (97 °F) (Temporal)   Resp 16   Ht 1.93 m (6' 4\")   Wt (!) 135 kg (297 lb)   SpO2 97%   BMI 36.15 kg/m²   General: Alert, pleasant, NAD  HEENT: Normocephalic. Neck supple.  No thyromegaly or masses palpated. No cervical or supraclavicular lymphadenopathy. No carotid bruits   Heart: Regular rate and rhythm.  S1 and S2 normal.  " No murmurs appreciated.  Respiratory: Normal respiratory effort.  Clear to auscultation bilaterally.  Skin: Warm, dry, no rashes.  Extremities: 2+ pitting edema to the left shin, 1+ pitting edema to the right ankle  Psych:  Affect/mood is normal, judgement is good, memory is intact, grooming is appropriate.    Assessment/Plan:     Jhony was seen today for follow-up.    Diagnoses and all orders for this visit:    Stage 3b chronic kidney disease (HCC)  Attributed to obstructive uropathy.  Continue to follow-up with nephrology.    Obstructive uropathy-postvoid residual greater than 800 at urology office  Currently followed by urology.  Scheduled for TURP at the beginning of next month.  Hopefully this will improve CKD.  Continue 0.5 mg Flomax in addition to 5 mg finasteride    Edema of both lower legs  Stable.  Did advise that if he has more than 3 pound weight gain, can take 20 mg of Lasix in the afternoon.  For now continue 40 mg of Lasix in addition to potassium daily.    Elevated blood pressure reading without diagnosis of hypertension  Blood pressure reading is slightly elevated in clinic today.  Did advise to monitor blood pressure at home for the next week.  If it is consistently greater than 140 systolic send Comeks message and we will start on 10 mg of lisinopril.      Return in about 3 months (around 3/19/2024) for Medication Check w/pcp.

## 2024-01-03 ENCOUNTER — HOSPITAL ENCOUNTER (OUTPATIENT)
Dept: CARDIOLOGY | Facility: MEDICAL CENTER | Age: 74
End: 2024-01-03
Attending: UROLOGY
Payer: MEDICARE

## 2024-01-03 LAB — EKG IMPRESSION: NORMAL

## 2024-01-03 PROCEDURE — 93005 ELECTROCARDIOGRAM TRACING: CPT | Performed by: UROLOGY

## 2024-01-03 PROCEDURE — 93010 ELECTROCARDIOGRAM REPORT: CPT | Performed by: INTERNAL MEDICINE

## 2024-01-04 ENCOUNTER — HOSPITAL ENCOUNTER (OUTPATIENT)
Dept: LAB | Facility: MEDICAL CENTER | Age: 74
End: 2024-01-04
Attending: UROLOGY
Payer: MEDICARE

## 2024-01-04 DIAGNOSIS — N18.32 STAGE 3B CHRONIC KIDNEY DISEASE: ICD-10-CM

## 2024-01-04 LAB
APPEARANCE UR: CLEAR
APTT PPP: 33.9 SEC (ref 24.7–36)
BACTERIA #/AREA URNS HPF: NEGATIVE /HPF
BASOPHILS # BLD AUTO: 0.5 % (ref 0–1.8)
BASOPHILS # BLD: 0.04 K/UL (ref 0–0.12)
BILIRUB UR QL STRIP.AUTO: NEGATIVE
COLOR UR: YELLOW
EOSINOPHIL # BLD AUTO: 0.37 K/UL (ref 0–0.51)
EOSINOPHIL NFR BLD: 4.7 % (ref 0–6.9)
EPI CELLS #/AREA URNS HPF: NEGATIVE /HPF
ERYTHROCYTE [DISTWIDTH] IN BLOOD BY AUTOMATED COUNT: 47.1 FL (ref 35.9–50)
ERYTHROCYTE [DISTWIDTH] IN BLOOD BY AUTOMATED COUNT: 48 FL (ref 35.9–50)
GLUCOSE UR STRIP.AUTO-MCNC: NEGATIVE MG/DL
HCT VFR BLD AUTO: 31.3 % (ref 42–52)
HCT VFR BLD AUTO: 31.9 % (ref 42–52)
HGB BLD-MCNC: 10.1 G/DL (ref 14–18)
HGB BLD-MCNC: 10.2 G/DL (ref 14–18)
HYALINE CASTS #/AREA URNS LPF: ABNORMAL /LPF
IMM GRANULOCYTES # BLD AUTO: 0.02 K/UL (ref 0–0.11)
IMM GRANULOCYTES NFR BLD AUTO: 0.3 % (ref 0–0.9)
INR PPP: 1.05 (ref 0.87–1.13)
KETONES UR STRIP.AUTO-MCNC: NEGATIVE MG/DL
LEUKOCYTE ESTERASE UR QL STRIP.AUTO: ABNORMAL
LYMPHOCYTES # BLD AUTO: 0.49 K/UL (ref 1–4.8)
LYMPHOCYTES NFR BLD: 6.2 % (ref 22–41)
MCH RBC QN AUTO: 29.4 PG (ref 27–33)
MCH RBC QN AUTO: 29.7 PG (ref 27–33)
MCHC RBC AUTO-ENTMCNC: 32 G/DL (ref 32.3–36.5)
MCHC RBC AUTO-ENTMCNC: 32.3 G/DL (ref 32.3–36.5)
MCV RBC AUTO: 91.3 FL (ref 81.4–97.8)
MCV RBC AUTO: 93 FL (ref 81.4–97.8)
MICRO URNS: ABNORMAL
MONOCYTES # BLD AUTO: 0.65 K/UL (ref 0–0.85)
MONOCYTES NFR BLD AUTO: 8.3 % (ref 0–13.4)
NEUTROPHILS # BLD AUTO: 6.3 K/UL (ref 1.82–7.42)
NEUTROPHILS NFR BLD: 80 % (ref 44–72)
NITRITE UR QL STRIP.AUTO: NEGATIVE
NRBC # BLD AUTO: 0 K/UL
NRBC BLD-RTO: 0 /100 WBC (ref 0–0.2)
PH UR STRIP.AUTO: 6 [PH] (ref 5–8)
PLATELET # BLD AUTO: 281 K/UL (ref 164–446)
PLATELET # BLD AUTO: 285 K/UL (ref 164–446)
PMV BLD AUTO: 10 FL (ref 9–12.9)
PMV BLD AUTO: 9.8 FL (ref 9–12.9)
PROT UR QL STRIP: NEGATIVE MG/DL
PROTHROMBIN TIME: 13.8 SEC (ref 12–14.6)
RBC # BLD AUTO: 3.43 M/UL (ref 4.7–6.1)
RBC # BLD AUTO: 3.43 M/UL (ref 4.7–6.1)
RBC # URNS HPF: ABNORMAL /HPF
RBC UR QL AUTO: NEGATIVE
SP GR UR STRIP.AUTO: 1.01
UROBILINOGEN UR STRIP.AUTO-MCNC: 0.2 MG/DL
WBC # BLD AUTO: 7.9 K/UL (ref 4.8–10.8)
WBC # BLD AUTO: 8 K/UL (ref 4.8–10.8)
WBC #/AREA URNS HPF: ABNORMAL /HPF

## 2024-01-04 PROCEDURE — 85610 PROTHROMBIN TIME: CPT

## 2024-01-04 PROCEDURE — 80048 BASIC METABOLIC PNL TOTAL CA: CPT

## 2024-01-04 PROCEDURE — 80048 BASIC METABOLIC PNL TOTAL CA: CPT | Mod: 91

## 2024-01-04 PROCEDURE — 82570 ASSAY OF URINE CREATININE: CPT

## 2024-01-04 PROCEDURE — 82043 UR ALBUMIN QUANTITATIVE: CPT

## 2024-01-04 PROCEDURE — 81001 URINALYSIS AUTO W/SCOPE: CPT

## 2024-01-04 PROCEDURE — 85027 COMPLETE CBC AUTOMATED: CPT | Mod: XU

## 2024-01-04 PROCEDURE — 85730 THROMBOPLASTIN TIME PARTIAL: CPT

## 2024-01-04 PROCEDURE — 85025 COMPLETE CBC W/AUTO DIFF WBC: CPT

## 2024-01-05 LAB
ANION GAP SERPL CALC-SCNC: 12 MMOL/L (ref 7–16)
ANION GAP SERPL CALC-SCNC: 12 MMOL/L (ref 7–16)
BUN SERPL-MCNC: 53 MG/DL (ref 8–22)
BUN SERPL-MCNC: 53 MG/DL (ref 8–22)
CALCIUM SERPL-MCNC: 8.2 MG/DL (ref 8.5–10.5)
CALCIUM SERPL-MCNC: 8.2 MG/DL (ref 8.5–10.5)
CHLORIDE SERPL-SCNC: 110 MMOL/L (ref 96–112)
CHLORIDE SERPL-SCNC: 110 MMOL/L (ref 96–112)
CO2 SERPL-SCNC: 17 MMOL/L (ref 20–33)
CO2 SERPL-SCNC: 17 MMOL/L (ref 20–33)
CREAT SERPL-MCNC: 3.86 MG/DL (ref 0.5–1.4)
CREAT SERPL-MCNC: 3.86 MG/DL (ref 0.5–1.4)
CREAT UR-MCNC: 41.32 MG/DL
GFR SERPLBLD CREATININE-BSD FMLA CKD-EPI: 16 ML/MIN/1.73 M 2
GFR SERPLBLD CREATININE-BSD FMLA CKD-EPI: 16 ML/MIN/1.73 M 2
GLUCOSE SERPL-MCNC: 87 MG/DL (ref 65–99)
GLUCOSE SERPL-MCNC: 90 MG/DL (ref 65–99)
MICROALBUMIN UR-MCNC: <1.2 MG/DL
MICROALBUMIN/CREAT UR: NORMAL MG/G (ref 0–30)
POTASSIUM SERPL-SCNC: 5 MMOL/L (ref 3.6–5.5)
POTASSIUM SERPL-SCNC: 5 MMOL/L (ref 3.6–5.5)
SODIUM SERPL-SCNC: 139 MMOL/L (ref 135–145)
SODIUM SERPL-SCNC: 139 MMOL/L (ref 135–145)

## 2024-01-09 ENCOUNTER — OFFICE VISIT (OUTPATIENT)
Dept: NEPHROLOGY | Facility: MEDICAL CENTER | Age: 74
End: 2024-01-09
Payer: MEDICARE

## 2024-01-09 VITALS
TEMPERATURE: 98.2 F | DIASTOLIC BLOOD PRESSURE: 72 MMHG | BODY MASS INDEX: 33.73 KG/M2 | HEIGHT: 76 IN | HEART RATE: 62 BPM | OXYGEN SATURATION: 98 % | WEIGHT: 277 LBS | SYSTOLIC BLOOD PRESSURE: 132 MMHG | RESPIRATION RATE: 18 BRPM

## 2024-01-09 DIAGNOSIS — N18.32 STAGE 3B CHRONIC KIDNEY DISEASE: ICD-10-CM

## 2024-01-09 PROCEDURE — 99214 OFFICE O/P EST MOD 30 MIN: CPT | Performed by: INTERNAL MEDICINE

## 2024-01-09 PROCEDURE — 3078F DIAST BP <80 MM HG: CPT | Performed by: INTERNAL MEDICINE

## 2024-01-09 PROCEDURE — 3075F SYST BP GE 130 - 139MM HG: CPT | Performed by: INTERNAL MEDICINE

## 2024-01-09 RX ORDER — SODIUM BICARBONATE 325 MG/1
325 TABLET ORAL 2 TIMES DAILY
Qty: 90 TABLET | Refills: 3 | Status: SHIPPED | OUTPATIENT
Start: 2024-01-09 | End: 2024-02-16

## 2024-01-09 ASSESSMENT — FIBROSIS 4 INDEX: FIB4 SCORE: 1.14

## 2024-01-09 NOTE — PROGRESS NOTES
"NEPHROLOGY HISTORY AND PHYSICAL CONSULT NOTE    Chief Complaint   Patient presents with    Follow-Up    Chronic Kidney Disease           HPI:  Jhony Rodriguez is a 73 y.o. male who presents today for evaluation of for acute kidney injury.    Patient continues catheterization initially twice daily, but has not gotten up to four to five times daily. He reports that it is difficult to increase catheterizations due to pain.  He does reports avoidance of NSAIDs.     He remains on finasteride and tamsulosin.      Outpatient Encounter Medications as of 1/9/2024   Medication Sig Dispense Refill    sildenafil (REVATIO) 20 MG tablet Take 20 mg by mouth.      potassium chloride SA (KDUR) 20 MEQ Tab CR TAKE 1 TABLET BY MOUTH EVERY  Tablet 1    furosemide (LASIX) 40 MG Tab TAKE 1 TABLET BY MOUTH EVERY  Tablet 1    finasteride (PROSCAR) 5 MG Tab Take 5 mg by mouth every day.      Cholecalciferol (DIALYVITE VITAMIN D 5000 PO) Take 5,000 Units by mouth every day.      tamsulosin (FLOMAX) 0.4 MG capsule Take 2 Capsules by mouth 1/2 hour after breakfast. 180 Capsule 3    Cyanocobalamin (B-12) 1000 MCG Tab CR Take 1 Tablet by mouth see administration instructions. 2 times a week 100 Tablet 3     No facility-administered encounter medications on file as of 1/9/2024.        Allergies   Allergen Reactions    Meloxicam      alterred mentation    Penicillins Rash and Swelling     Rash  Tolerated cefazolin 5/5/15       ROS    /72 (BP Location: Left arm, Patient Position: Sitting, BP Cuff Size: Adult)   Pulse 62   Temp 36.8 °C (98.2 °F) (Temporal)   Resp 18   Ht 1.93 m (6' 4\")   Wt (!) 126 kg (277 lb)   SpO2 98%   BMI 33.72 kg/m²     Physical Exam  GEN: alert and oriented. In no acute distress.   HEENT: moist oropharyngeal mucous membranes  CV:RRR  PULM: clear to auscultation bilaterally  ABD: soft non tender non distended  EXT: warm well perfused, no lower extremity edema.    Labs reviewed.  Recent Labs     " 03/16/23  0606 05/31/23  0937 06/29/23  0910 09/21/23  0644 11/20/23  0638 01/04/24  1033 01/04/24  1035   ALBUMIN 3.7 4.0 4.0 3.7  --   --   --    HDL 60 61  --  51  --   --   --    TRIGLYCERIDE 58 60  --  59  --   --   --    SODIUM 140 142 141 143 141 139 139   POTASSIUM 4.6 5.6* 5.3 4.8 5.6* 5.0 5.0   CHLORIDE 108 112 107 113* 109 110 110   CO2 23 21 23 20 20 17* 17*   BUN 20 25* 27* 30* 45* 53* 53*   CREATININE 0.90 1.92* 1.55* 1.97* 2.41* 3.86* 3.86*       Lab Results   Component Value Date/Time    WBC 7.9 01/04/2024 10:35 AM    WBC 5.8 12/06/2010 08:03 AM    RBC 3.43 (L) 01/04/2024 10:35 AM    RBC 4.97 12/06/2010 08:03 AM    HEMOGLOBIN 10.1 (L) 01/04/2024 10:35 AM    HEMATOCRIT 31.3 (L) 01/04/2024 10:35 AM    MCV 91.3 01/04/2024 10:35 AM    MCV 85 12/06/2010 08:03 AM    MCH 29.4 01/04/2024 10:35 AM    MCH 28.0 12/06/2010 08:03 AM    MCHC 32.3 01/04/2024 10:35 AM    MPV 9.8 01/04/2024 10:35 AM              URINALYSIS:  Lab Results   Component Value Date/Time    COLORURINE Yellow 01/04/2024 1034    CLARITY Clear 01/04/2024 1034    SPECGRAVITY 1.009 01/04/2024 1034    PHURINE 6.0 01/04/2024 1034    KETONES Negative 01/04/2024 1034    PROTEINURIN Negative 01/04/2024 1034    BILIRUBINUR Negative 01/04/2024 1034    UROBILU 0.2 01/04/2024 1034    NITRITE Negative 01/04/2024 1034    LEUKESTERAS Small (A) 01/04/2024 1034    OCCULTBLOOD Negative 01/04/2024 1034     UPC  Lab Results   Component Value Date/Time    TOTPROTUR <4.0 11/20/2023 0639      Lab Results   Component Value Date/Time    CREATININEU 42.73 11/20/2023 0639       Imaging report(s) reviewed  No orders to display         Assessment:  Jhony Rodriguez is a 73 y.o. male who presents today evaluation of  chronic kidney disease due to obstructive uropathy.         CKD due to Obstructive Uropathy. - worse today likely from ongoing obstructive causes from BPH with limited catheterizations.   -Urinalysis with pyuria, 0-2 RBCs. negative for bacteria negative  for protein.  - Renal ultrasound on 10/3/2023 demonstrated moderate bilateral hydronephrosis with trabeculated urinary bladder wall.  Significant postvoid residual identified.  -Negative for proteinuria.   -Routine management of hypertension  - Dose all medications for patient level of renal function  - Avoid nephrotoxic agents including contrast and NSAIDs. Anticipated prostate surgery with plans for tylenol.   - Encourage adequate hydration.  -Continue diuresis lasix 40 mg.    - Continue catheterizations per Urology recommenations.      2.  BPH -continue Flomax.  Continue finasteride.  PSA  within normal limits. Follow up with urology. Anticipated prostate surgery in January 2024. Continue intermittent catheterizations.      3.  Hyperkalemia - resolved.   Likely due to kidney dysfunction.  Continue diuresis.  Recommend continued observation.  Return to clinic in 2 months.    4. Non Anion Gap Metabolic Acidosis - CO2 17. No clear respiratory alkalosis by review. Recommend sodium bicarbonate 325 mg BID.      Alberta Chu MD  Nephrology  [facility]

## 2024-01-10 ENCOUNTER — HOSPITAL ENCOUNTER (OUTPATIENT)
Dept: LAB | Facility: MEDICAL CENTER | Age: 74
End: 2024-01-10
Attending: UROLOGY
Payer: MEDICARE

## 2024-01-10 DIAGNOSIS — N18.32 STAGE 3B CHRONIC KIDNEY DISEASE: ICD-10-CM

## 2024-01-10 LAB
ANION GAP SERPL CALC-SCNC: 15 MMOL/L (ref 7–16)
ANION GAP SERPL CALC-SCNC: 16 MMOL/L (ref 7–16)
APPEARANCE UR: CLEAR
BACTERIA #/AREA URNS HPF: NEGATIVE /HPF
BILIRUB UR QL STRIP.AUTO: NEGATIVE
BUN SERPL-MCNC: 52 MG/DL (ref 8–22)
BUN SERPL-MCNC: 52 MG/DL (ref 8–22)
CALCIUM SERPL-MCNC: 8.8 MG/DL (ref 8.5–10.5)
CALCIUM SERPL-MCNC: 8.8 MG/DL (ref 8.5–10.5)
CHLORIDE SERPL-SCNC: 103 MMOL/L (ref 96–112)
CHLORIDE SERPL-SCNC: 104 MMOL/L (ref 96–112)
CO2 SERPL-SCNC: 18 MMOL/L (ref 20–33)
CO2 SERPL-SCNC: 18 MMOL/L (ref 20–33)
COLOR UR: YELLOW
CREAT SERPL-MCNC: 3.23 MG/DL (ref 0.5–1.4)
CREAT SERPL-MCNC: 3.35 MG/DL (ref 0.5–1.4)
CREAT UR-MCNC: 38.82 MG/DL
EPI CELLS #/AREA URNS HPF: NEGATIVE /HPF
ERYTHROCYTE [DISTWIDTH] IN BLOOD BY AUTOMATED COUNT: 46.9 FL (ref 35.9–50)
GFR SERPLBLD CREATININE-BSD FMLA CKD-EPI: 19 ML/MIN/1.73 M 2
GFR SERPLBLD CREATININE-BSD FMLA CKD-EPI: 20 ML/MIN/1.73 M 2
GLUCOSE SERPL-MCNC: 85 MG/DL (ref 65–99)
GLUCOSE SERPL-MCNC: 88 MG/DL (ref 65–99)
GLUCOSE UR STRIP.AUTO-MCNC: NEGATIVE MG/DL
HCT VFR BLD AUTO: 34.5 % (ref 42–52)
HGB BLD-MCNC: 11.2 G/DL (ref 14–18)
HYALINE CASTS #/AREA URNS LPF: ABNORMAL /LPF
KETONES UR STRIP.AUTO-MCNC: NEGATIVE MG/DL
LEUKOCYTE ESTERASE UR QL STRIP.AUTO: ABNORMAL
MCH RBC QN AUTO: 29.6 PG (ref 27–33)
MCHC RBC AUTO-ENTMCNC: 32.5 G/DL (ref 32.3–36.5)
MCV RBC AUTO: 91 FL (ref 81.4–97.8)
MICRO URNS: ABNORMAL
MICROALBUMIN UR-MCNC: <1.2 MG/DL
MICROALBUMIN/CREAT UR: NORMAL MG/G (ref 0–30)
NITRITE UR QL STRIP.AUTO: NEGATIVE
PH UR STRIP.AUTO: 6 [PH] (ref 5–8)
PLATELET # BLD AUTO: 336 K/UL (ref 164–446)
PMV BLD AUTO: 10.3 FL (ref 9–12.9)
POTASSIUM SERPL-SCNC: 4.6 MMOL/L (ref 3.6–5.5)
POTASSIUM SERPL-SCNC: 4.7 MMOL/L (ref 3.6–5.5)
PROT UR QL STRIP: NEGATIVE MG/DL
PTH-INTACT SERPL-MCNC: 295 PG/ML (ref 14–72)
RBC # BLD AUTO: 3.79 M/UL (ref 4.7–6.1)
RBC # URNS HPF: ABNORMAL /HPF
RBC UR QL AUTO: ABNORMAL
SODIUM SERPL-SCNC: 137 MMOL/L (ref 135–145)
SODIUM SERPL-SCNC: 137 MMOL/L (ref 135–145)
SP GR UR STRIP.AUTO: 1.01
UROBILINOGEN UR STRIP.AUTO-MCNC: 0.2 MG/DL
WBC # BLD AUTO: 8.2 K/UL (ref 4.8–10.8)
WBC #/AREA URNS HPF: ABNORMAL /HPF

## 2024-01-10 PROCEDURE — 83970 ASSAY OF PARATHORMONE: CPT

## 2024-01-10 PROCEDURE — 82570 ASSAY OF URINE CREATININE: CPT

## 2024-01-10 PROCEDURE — 80048 BASIC METABOLIC PNL TOTAL CA: CPT

## 2024-01-10 PROCEDURE — 81001 URINALYSIS AUTO W/SCOPE: CPT

## 2024-01-10 PROCEDURE — 80048 BASIC METABOLIC PNL TOTAL CA: CPT | Mod: 91

## 2024-01-10 PROCEDURE — 85027 COMPLETE CBC AUTOMATED: CPT

## 2024-01-10 PROCEDURE — 36415 COLL VENOUS BLD VENIPUNCTURE: CPT

## 2024-01-10 PROCEDURE — 82043 UR ALBUMIN QUANTITATIVE: CPT

## 2024-01-18 ENCOUNTER — HOSPITAL ENCOUNTER (OUTPATIENT)
Facility: MEDICAL CENTER | Age: 74
End: 2024-01-18
Attending: UROLOGY
Payer: MEDICARE

## 2024-01-18 LAB
ANION GAP SERPL CALC-SCNC: 12 MMOL/L (ref 7–16)
BUN SERPL-MCNC: 41 MG/DL (ref 8–22)
CALCIUM SERPL-MCNC: 8.6 MG/DL (ref 8.5–10.5)
CHLORIDE SERPL-SCNC: 107 MMOL/L (ref 96–112)
CO2 SERPL-SCNC: 24 MMOL/L (ref 20–33)
CREAT SERPL-MCNC: 2.43 MG/DL (ref 0.5–1.4)
GFR SERPLBLD CREATININE-BSD FMLA CKD-EPI: 28 ML/MIN/1.73 M 2
GLUCOSE SERPL-MCNC: 85 MG/DL (ref 65–99)
POTASSIUM SERPL-SCNC: 4.9 MMOL/L (ref 3.6–5.5)
SODIUM SERPL-SCNC: 143 MMOL/L (ref 135–145)

## 2024-01-18 PROCEDURE — 80048 BASIC METABOLIC PNL TOTAL CA: CPT

## 2024-01-31 ENCOUNTER — HOSPITAL ENCOUNTER (OUTPATIENT)
Dept: LAB | Facility: MEDICAL CENTER | Age: 74
End: 2024-01-31
Attending: INTERNAL MEDICINE
Payer: MEDICARE

## 2024-01-31 LAB
ANION GAP SERPL CALC-SCNC: 9 MMOL/L (ref 7–16)
APPEARANCE UR: CLEAR
BACTERIA #/AREA URNS HPF: NEGATIVE /HPF
BILIRUB UR QL STRIP.AUTO: NEGATIVE
BUN SERPL-MCNC: 30 MG/DL (ref 8–22)
CALCIUM SERPL-MCNC: 8.7 MG/DL (ref 8.5–10.5)
CHLORIDE SERPL-SCNC: 108 MMOL/L (ref 96–112)
CO2 SERPL-SCNC: 24 MMOL/L (ref 20–33)
COLOR UR: YELLOW
CREAT SERPL-MCNC: 1.76 MG/DL (ref 0.5–1.4)
CREAT UR-MCNC: 44.36 MG/DL
EPI CELLS #/AREA URNS HPF: NEGATIVE /HPF
ERYTHROCYTE [DISTWIDTH] IN BLOOD BY AUTOMATED COUNT: 49.1 FL (ref 35.9–50)
GFR SERPLBLD CREATININE-BSD FMLA CKD-EPI: 41 ML/MIN/1.73 M 2
GLUCOSE SERPL-MCNC: 89 MG/DL (ref 65–99)
GLUCOSE UR STRIP.AUTO-MCNC: NEGATIVE MG/DL
HCT VFR BLD AUTO: 33.9 % (ref 42–52)
HGB BLD-MCNC: 10.8 G/DL (ref 14–18)
HYALINE CASTS #/AREA URNS LPF: ABNORMAL /LPF
KETONES UR STRIP.AUTO-MCNC: NEGATIVE MG/DL
LEUKOCYTE ESTERASE UR QL STRIP.AUTO: ABNORMAL
MCH RBC QN AUTO: 29.8 PG (ref 27–33)
MCHC RBC AUTO-ENTMCNC: 31.9 G/DL (ref 32.3–36.5)
MCV RBC AUTO: 93.6 FL (ref 81.4–97.8)
MICRO URNS: ABNORMAL
MICROALBUMIN UR-MCNC: 2.5 MG/DL
MICROALBUMIN/CREAT UR: 56 MG/G (ref 0–30)
NITRITE UR QL STRIP.AUTO: NEGATIVE
PH UR STRIP.AUTO: 6 [PH] (ref 5–8)
PLATELET # BLD AUTO: 258 K/UL (ref 164–446)
PMV BLD AUTO: 10.6 FL (ref 9–12.9)
POTASSIUM SERPL-SCNC: 4.6 MMOL/L (ref 3.6–5.5)
PROT UR QL STRIP: NEGATIVE MG/DL
PTH-INTACT SERPL-MCNC: 196 PG/ML (ref 14–72)
RBC # BLD AUTO: 3.62 M/UL (ref 4.7–6.1)
RBC # URNS HPF: ABNORMAL /HPF
RBC UR QL AUTO: ABNORMAL
SODIUM SERPL-SCNC: 141 MMOL/L (ref 135–145)
SP GR UR STRIP.AUTO: 1.01
UROBILINOGEN UR STRIP.AUTO-MCNC: 0.2 MG/DL
WBC # BLD AUTO: 5.5 K/UL (ref 4.8–10.8)
WBC #/AREA URNS HPF: ABNORMAL /HPF

## 2024-01-31 PROCEDURE — 82043 UR ALBUMIN QUANTITATIVE: CPT

## 2024-01-31 PROCEDURE — 80048 BASIC METABOLIC PNL TOTAL CA: CPT

## 2024-01-31 PROCEDURE — 85027 COMPLETE CBC AUTOMATED: CPT

## 2024-01-31 PROCEDURE — 81001 URINALYSIS AUTO W/SCOPE: CPT

## 2024-01-31 PROCEDURE — 82570 ASSAY OF URINE CREATININE: CPT

## 2024-01-31 PROCEDURE — 36415 COLL VENOUS BLD VENIPUNCTURE: CPT

## 2024-01-31 PROCEDURE — 83970 ASSAY OF PARATHORMONE: CPT

## 2024-02-02 NOTE — ANESTHESIA TIME REPORT
Anesthesia Start and Stop Event Times     Date Time Event    5/20/2020 0941 Ready for Procedure     1000 Anesthesia Start     1148 Anesthesia Stop        Responsible Staff  05/20/20    Name Role Begin End    Osito Montero M.D. Anesth 1000 1148        Preop Diagnosis (Free Text):  Pre-op Diagnosis     ARTHRITIS OF HIP        Preop Diagnosis (Codes):    Post op Diagnosis  Arthritis of right hip      Premium Reason  Non-Premium    Comments:                                                                       
EKG

## 2024-02-09 ENCOUNTER — OFFICE VISIT (OUTPATIENT)
Dept: NEPHROLOGY | Facility: MEDICAL CENTER | Age: 74
End: 2024-02-09
Payer: MEDICARE

## 2024-02-09 VITALS
RESPIRATION RATE: 18 BRPM | OXYGEN SATURATION: 97 % | SYSTOLIC BLOOD PRESSURE: 122 MMHG | TEMPERATURE: 98.3 F | WEIGHT: 281 LBS | BODY MASS INDEX: 33.18 KG/M2 | HEIGHT: 77 IN | HEART RATE: 50 BPM | DIASTOLIC BLOOD PRESSURE: 72 MMHG

## 2024-02-09 DIAGNOSIS — N18.32 STAGE 3B CHRONIC KIDNEY DISEASE: ICD-10-CM

## 2024-02-09 PROCEDURE — 3074F SYST BP LT 130 MM HG: CPT | Performed by: INTERNAL MEDICINE

## 2024-02-09 PROCEDURE — 3078F DIAST BP <80 MM HG: CPT | Performed by: INTERNAL MEDICINE

## 2024-02-09 PROCEDURE — 99214 OFFICE O/P EST MOD 30 MIN: CPT | Performed by: INTERNAL MEDICINE

## 2024-02-09 ASSESSMENT — FIBROSIS 4 INDEX: FIB4 SCORE: 1.26

## 2024-02-09 NOTE — PATIENT INSTRUCTIONS
It has been a pleasure seeing you today Dr. Rodriguez.    Fortunately your kidney function has significantly improved likely as a result of your prostate surgery allowing for free flow of urine out of the body.    I recommend we continue to monitor your renal function periodically.    Stop taking sodium bicarbonate at this time, given improvement in your kidney function.    Continue taking your prostate medications including finasteride and tamsulosin.    You may continue taking your water pill, furosemide 40 mg daily.    I recommend you continue to follow with urology as appropriate for further management of prostate changes.    I recommend you continue to avoid NSAID medications (ibuprofen) as these medications can be toxic to the cells of the kidney.    Continue to stay hydrated with water.    Wishing you all of the best!    Sincerely,    Alberta Chu MD

## 2024-02-09 NOTE — PROGRESS NOTES
"NEPHROLOGY PROGRESS CONSULT NOTE       HPI:  Jhony Rodriguez is a 73 y.o. male who presents today for further evaluation of chronic kidney disease.    Patient is overall doing well. He continues to follow up with   Urology.     Denies chest pain and shortness of breath.      Patient seen by urology last month for ongoing intermittent catheterizations and ultimately underwent TURP two weeks ago  Creatinine improved 1.76.        Outpatient Encounter Medications as of 2/9/2024   Medication Sig Dispense Refill    sodium bicarbonate 325 MG Tab Take 1 Tablet by mouth 2 times a day. 90 Tablet 3    sildenafil (REVATIO) 20 MG tablet Take 20 mg by mouth.      potassium chloride SA (KDUR) 20 MEQ Tab CR TAKE 1 TABLET BY MOUTH EVERY  Tablet 1    furosemide (LASIX) 40 MG Tab TAKE 1 TABLET BY MOUTH EVERY  Tablet 1    finasteride (PROSCAR) 5 MG Tab Take 5 mg by mouth every day.      Cholecalciferol (DIALYVITE VITAMIN D 5000 PO) Take 5,000 Units by mouth every day.      tamsulosin (FLOMAX) 0.4 MG capsule Take 2 Capsules by mouth 1/2 hour after breakfast. 180 Capsule 3    Cyanocobalamin (B-12) 1000 MCG Tab CR Take 1 Tablet by mouth see administration instructions. 2 times a week 100 Tablet 3     No facility-administered encounter medications on file as of 2/9/2024.        Allergies   Allergen Reactions    Meloxicam      alterred mentation    Penicillins Rash and Swelling     Rash  Tolerated cefazolin 5/5/15       ROS    /72 (BP Location: Left arm, Patient Position: Sitting, BP Cuff Size: Adult)   Pulse (!) 50   Temp 36.8 °C (98.3 °F) (Temporal)   Resp 18   Ht 1.943 m (6' 4.5\")   Wt (!) 127 kg (281 lb)   SpO2 97%   BMI 33.76 kg/m²     Physical Exam  GEN: alert and oriented. In no acute distress.   HEENT: moist oropharyngeal mucous membranes  CV:RRR  PULM: clear to auscultation bilaterally  ABD: soft non tender non distended  EXT: warm well perfused, no lower extremity edema.    Labs reviewed.  Recent " Labs     03/16/23  0606 05/31/23  0937 06/29/23  0910 09/21/23  0644 11/20/23  0638 01/10/24  1113 01/18/24  0840 01/31/24  0738   ALBUMIN 3.7 4.0 4.0 3.7  --   --   --   --    HDL 60 61  --  51  --   --   --   --    TRIGLYCERIDE 58 60  --  59  --   --   --   --    SODIUM 140 142 141 143   < > 137 143 141   POTASSIUM 4.6 5.6* 5.3 4.8   < > 4.6 4.9 4.6   CHLORIDE 108 112 107 113*   < > 103 107 108   CO2 23 21 23 20   < > 18* 24 24   BUN 20 25* 27* 30*   < > 52* 41* 30*   CREATININE 0.90 1.92* 1.55* 1.97*   < > 3.23* 2.43* 1.76*    < > = values in this interval not displayed.       Lab Results   Component Value Date/Time    WBC 5.5 01/31/2024 07:38 AM    WBC 5.8 12/06/2010 08:03 AM    RBC 3.62 (L) 01/31/2024 07:38 AM    RBC 4.97 12/06/2010 08:03 AM    HEMOGLOBIN 10.8 (L) 01/31/2024 07:38 AM    HEMATOCRIT 33.9 (L) 01/31/2024 07:38 AM    MCV 93.6 01/31/2024 07:38 AM    MCV 85 12/06/2010 08:03 AM    MCH 29.8 01/31/2024 07:38 AM    MCH 28.0 12/06/2010 08:03 AM    MCHC 31.9 (L) 01/31/2024 07:38 AM    MPV 10.6 01/31/2024 07:38 AM              URINALYSIS:  Lab Results   Component Value Date/Time    COLORURINE Yellow 01/31/2024 0739    CLARITY Clear 01/31/2024 0739    SPECGRAVITY 1.010 01/31/2024 0739    PHURINE 6.0 01/31/2024 0739    KETONES Negative 01/31/2024 0739    PROTEINURIN Negative 01/31/2024 0739    BILIRUBINUR Negative 01/31/2024 0739    UROBILU 0.2 01/31/2024 0739    NITRITE Negative 01/31/2024 0739    LEUKESTERAS Trace (A) 01/31/2024 0739    OCCULTBLOOD Small (A) 01/31/2024 0739     [facility] [place]  Lab Results   Component Value Date/Time    TOTPROTUR <4.0 11/20/2023 0639      Lab Results   Component Value Date/Time    CREATININEU 42.73 11/20/2023 0639       Imaging report(s) reviewed  No orders to display         Assessment:  Jhony Rodriguez is a 73 y.o. male who presents today evaluation of  chronic kidney disease due to obstructive uropathy.          CKD due to Obstructive Uropathy IIIb. -much improved likely from  ongoing obstructive causes from BPH with limited catheterizations.   -Urinalysis with pyuria, 2-5 RBCs. negative for bacteria negative for protein.  - Renal ultrasound on 10/3/2023 demonstrated moderate bilateral hydronephrosis with trabeculated urinary bladder wall.  Significant postvoid residual identified.  -Negative for proteinuria.   -Routine management of hypertension  - Dose all medications for patient level of renal function  - Avoid nephrotoxic agents including contrast and NSAIDs. Anticipated prostate surgery with plans for tylenol.   - Encourage adequate hydration.  -Continue diuresis lasix 40 mg.    - Continue catheterizations per Urology recommenations.      2.  BPH -continue Flomax.  Continue finasteride.  PSA  within normal limits. Follow up with urology. Complete TURP in January 2024.       3.  Hyperkalemia - resolved.   Likely due to kidney dysfunction.  Continue diuresis.  Recommend continued observation.     4. Non Anion Gap Metabolic Acidosis - resolved. May discontinue sodium bicarbonate at this time, given improvement in renal function. Will continue to monitor.         Return to clinic in 4 months.     Alberta Chu MD  Nephrology  [facility]

## 2024-02-14 ASSESSMENT — ACTIVITIES OF DAILY LIVING (ADL): BATHING_REQUIRES_ASSISTANCE: 0

## 2024-02-14 ASSESSMENT — ENCOUNTER SYMPTOMS: GENERAL WELL-BEING: GOOD

## 2024-02-14 ASSESSMENT — PATIENT HEALTH QUESTIONNAIRE - PHQ9: CLINICAL INTERPRETATION OF PHQ2 SCORE: 0

## 2024-02-15 NOTE — ASSESSMENT & PLAN NOTE
Chronic, improving following TURP for obstructive uropathy. Follows with nephrology and urology.     Latest Reference Range & Units 01/04/24 10:35 01/10/24 11:12 01/10/24 11:13 01/18/24 08:40 01/31/24 07:38   GFR (CKD-EPI) >60 mL/min/1.73 m 2 16 ! 19 ! 20 ! 28 ! 41 !   !: Data is abnormal

## 2024-02-16 ENCOUNTER — PATIENT MESSAGE (OUTPATIENT)
Dept: HEALTH INFORMATION MANAGEMENT | Facility: OTHER | Age: 74
End: 2024-02-16

## 2024-02-16 ENCOUNTER — OFFICE VISIT (OUTPATIENT)
Dept: MEDICAL GROUP | Facility: PHYSICIAN GROUP | Age: 74
End: 2024-02-16
Payer: MEDICARE

## 2024-02-16 VITALS
DIASTOLIC BLOOD PRESSURE: 60 MMHG | SYSTOLIC BLOOD PRESSURE: 118 MMHG | HEIGHT: 76 IN | WEIGHT: 283 LBS | BODY MASS INDEX: 34.46 KG/M2

## 2024-02-16 DIAGNOSIS — N40.1 BENIGN NODULAR PROSTATIC HYPERPLASIA WITH LOWER URINARY TRACT SYMPTOMS: ICD-10-CM

## 2024-02-16 DIAGNOSIS — E78.5 DYSLIPIDEMIA: ICD-10-CM

## 2024-02-16 DIAGNOSIS — G47.33 OSA TREATED WITH BIPAP: ICD-10-CM

## 2024-02-16 DIAGNOSIS — D50.9 NORMOCYTIC HYPOCHROMIC ANEMIA: ICD-10-CM

## 2024-02-16 DIAGNOSIS — E66.9 OBESITY (BMI 30-39.9): ICD-10-CM

## 2024-02-16 DIAGNOSIS — E66.09 CLASS 1 OBESITY DUE TO EXCESS CALORIES WITH SERIOUS COMORBIDITY AND BODY MASS INDEX (BMI) OF 34.0 TO 34.9 IN ADULT: ICD-10-CM

## 2024-02-16 DIAGNOSIS — N18.32 STAGE 3B CHRONIC KIDNEY DISEASE: ICD-10-CM

## 2024-02-16 DIAGNOSIS — I73.9 PERIPHERAL VASCULAR DISEASE, UNSPECIFIED (HCC): ICD-10-CM

## 2024-02-16 DIAGNOSIS — Z12.11 COLON CANCER SCREENING: ICD-10-CM

## 2024-02-16 PROBLEM — E66.811 CLASS 1 OBESITY WITH SERIOUS COMORBIDITY AND BODY MASS INDEX (BMI) OF 34.0 TO 34.9 IN ADULT: Status: ACTIVE | Noted: 2020-03-12

## 2024-02-16 PROCEDURE — 3074F SYST BP LT 130 MM HG: CPT | Performed by: PHYSICIAN ASSISTANT

## 2024-02-16 PROCEDURE — 1126F AMNT PAIN NOTED NONE PRSNT: CPT | Performed by: PHYSICIAN ASSISTANT

## 2024-02-16 PROCEDURE — 3078F DIAST BP <80 MM HG: CPT | Performed by: PHYSICIAN ASSISTANT

## 2024-02-16 PROCEDURE — G0439 PPPS, SUBSEQ VISIT: HCPCS | Performed by: PHYSICIAN ASSISTANT

## 2024-02-16 SDOH — ECONOMIC STABILITY: FOOD INSECURITY: WITHIN THE PAST 12 MONTHS, THE FOOD YOU BOUGHT JUST DIDN'T LAST AND YOU DIDN'T HAVE MONEY TO GET MORE.: NEVER TRUE

## 2024-02-16 SDOH — ECONOMIC STABILITY: FOOD INSECURITY: WITHIN THE PAST 12 MONTHS, YOU WORRIED THAT YOUR FOOD WOULD RUN OUT BEFORE YOU GOT MONEY TO BUY MORE.: NEVER TRUE

## 2024-02-16 SDOH — ECONOMIC STABILITY: INCOME INSECURITY: HOW HARD IS IT FOR YOU TO PAY FOR THE VERY BASICS LIKE FOOD, HOUSING, MEDICAL CARE, AND HEATING?: NOT HARD AT ALL

## 2024-02-16 ASSESSMENT — FIBROSIS 4 INDEX: FIB4 SCORE: 1.26

## 2024-02-16 ASSESSMENT — PAIN SCALES - GENERAL: PAINLEVEL: NO PAIN

## 2024-02-16 NOTE — ASSESSMENT & PLAN NOTE
Chronic, stable. BMI today 34.45. Associated with HLD and CARLOS. Pt has hx of gastric bypass in 2004. He is down 30-40lb since last year in part due to resolution of fluid retention. His goal is to get back to his baseline at 265lb and is joining a gym. Continue to encouraged calorie conscious diet with regular physical activity. Follow up with PCP at least annually for continued monitoring and management.

## 2024-02-16 NOTE — ASSESSMENT & PLAN NOTE
Chronic, stable. Most recent lipid panel from September 2023 WNL. Recommend Mediterranean diet and regular physical activity. Follow up with PCP at least annually for continued monitoring and management.   Lab Results   Component Value Date/Time    CHOLSTRLTOT 138 09/21/2023 06:44 AM    LDL 75 09/21/2023 06:44 AM    HDL 51 09/21/2023 06:44 AM    TRIGLYCERIDE 59 09/21/2023 06:44 AM

## 2024-02-16 NOTE — ASSESSMENT & PLAN NOTE
Chronic condition, diagnosed via findings on 2023 Quantaflo study. This was followed by an NAKUL which was normal. Pt continues to be asymptomatic. Continue to monitor with PCP.   No

## 2024-02-16 NOTE — PROGRESS NOTES
Comprehensive Health Assessment Program     Jhony Rodriguez is a 73 y.o. here for his comprehensive health assessment.    Patient Active Problem List    Diagnosis Date Noted    Stage 3b chronic kidney disease (HCC) 09/25/2023    Normocytic hypochromic anemia 09/25/2023    Obstructive uropathy-postvoid residual greater than 800 at urology office 09/25/2023    Vitamin D insufficiency 06/15/2023    Mild mitral regurgitation by prior echocardiogram 05/31/2023    Mild aortic insufficiency 05/31/2023    Urinary tract infection without hematuria 05/31/2023    Grade I hemorrhoids 03/08/2023    Peripheral vascular disease, unspecified (HCC) 02/13/2023    Class 1 obesity with serious comorbidity and body mass index (BMI) of 34.0 to 34.9 in adult 03/12/2020    Edema of both lower legs 03/12/2020    CARLOS treated with BiPAP 09/12/2019    Hypogonadism male 05/22/2019    Vitamin D deficiency 09/14/2017    Iron deficiency- due to gastric bypass 01/19/2017    Benign nodular prostatic hyperplasia with lower urinary tract symptoms 01/19/2017    Vitamin B 12 deficiency- due to gastric bypass 06/20/2016    Gastric bypass status for obesity-2005 dr gallo 11/19/2012    S/P RF ablation operation for arrhythmia-2008; no recurrence; no meds 12/10/2010       Current Outpatient Medications   Medication Sig Dispense Refill    potassium chloride SA (KDUR) 20 MEQ Tab CR TAKE 1 TABLET BY MOUTH EVERY  Tablet 1    furosemide (LASIX) 40 MG Tab TAKE 1 TABLET BY MOUTH EVERY  Tablet 1    Cholecalciferol (DIALYVITE VITAMIN D 5000 PO) Take 5,000 Units by mouth every day.      tamsulosin (FLOMAX) 0.4 MG capsule Take 2 Capsules by mouth 1/2 hour after breakfast. 180 Capsule 3    Cyanocobalamin (B-12) 1000 MCG Tab CR Take 1 Tablet by mouth see administration instructions. 2 times a week 100 Tablet 3    sildenafil (REVATIO) 20 MG tablet Take 20 mg by mouth. (Patient not taking: Reported on 2/16/2024)      finasteride (PROSCAR) 5 MG  Tab Take 5 mg by mouth every day. (Patient not taking: Reported on 2/16/2024)       No current facility-administered medications for this visit.          Current supplements as per medication list.     Allergies:   Meloxicam and Penicillins  Social History     Tobacco Use    Smoking status: Never    Smokeless tobacco: Never   Vaping Use    Vaping Use: Never used   Substance Use Topics    Alcohol use: Yes     Alcohol/week: 1.2 oz     Types: 1 Glasses of wine, 1 Cans of beer per week     Comment: one per month    Drug use: No     Family History   Problem Relation Age of Onset    Cancer Maternal Aunt     Cancer Maternal Uncle     Cancer Maternal Grandfather     Thyroid Mother     Heart Disease Mother     Heart Disease Brother     Sleep Apnea Neg Hx      Jhony  has a past medical history of Amnesia, global, transient- 2008, resolved; no recurrence; possibly due to due to medication interaction- celebrex and mobic   (01/19/2017), Arthritis, Atrial fibrillation (Formerly [facility]) (2007), Bilateral leg edema (03/12/2020), BMI 40.0-44.9, adult (Formerly [facility]) (02/08/2022), Dyslipidemia, H/O cardiac radiofrequency ablation (2006), Hip pain, right, History of atrial fibrillation (05/20/2020), History of obstructive sleep apnea- resolved with weight loss (06/20/2016), Hypertension, Obesity, Obesity (BMI 30-39.9) (02/13/2018), Primary osteoarthritis of right hip- THR tbd 5/2020-n dr barraza (05/23/2019), Prophylactic antibiotic- for dental work due to TKRs (09/14/2017), S/P total knee arthroplasty, bilateral (04/13/2020), Sleep apnea, and Snoring.   Past Surgical History:   Procedure Laterality Date    [place] TOTAL HIP ARTHROPLASTY Right 5/20/2020    Procedure: ARTHROPLASTY, HIP, TOTAL;  Surgeon: Connor Fonseca M.D.;  Location: SURGERY [facility];  Service: Orthopedics    KNEE ARTHROPLASTY TOTAL Left 5/5/2015    Procedure: KNEE ARTHROPLASTY TOTAL ;  Surgeon: Mike Michele M.D.;  Location: SURGERY [facility];  Service:     KNEE  ARTHROPLASTY TOTAL  1/6/2015    Performed by Mike Michele M.D. at SURGERY [facility]    KNEE ARTHROPLASTY TOTAL Bilateral 2015    4 months apart    INGUINAL HERNIA REPAIR  8/16/2013    Performed by Martin Dillon M.D. at SURGERY [facility]    LAPAROTOMY  2006    bowel perforation repair, following gastric  bypass    KNEE ARTHROSCOPY Left 2006    left knee meniscectomy; dr Michele    ORIF, WRIST Right 2005    GASTRIC BYPASS LAPAROSCOPIC  2004    dr. gallo    ARTHROSCOPY, KNEE         Screening:  In the last six months have you experienced any leakage of urine? No    Depression Screening  Little interest or pleasure in doing things?  0 - not at all  Feeling down, depressed , or hopeless? 0 - not at all  Patient Health Questionnaire Score: 0     If depressive symptoms identified deferred to follow up visit unless specifically addressed in assessment and plan.    Interpretation of PHQ-9 Total Score   Score Severity   1-4 No Depression   5-9 Mild Depression   10-14 Moderate Depression   15-19 Moderately Severe Depression   20-27 Severe Depression    Screening for Cognitive Impairment  Do you or any of your friends or family members have any concern about your memory? No  Three Minute Recall (Banana, Sunrise, Chair) 3/3    Jose clock face with all 12 numbers and set the hands to show 20 past 8.  Yes 5  Cognitive concerns identified deferred for follow up unless specifically addressed in assessment and plan.    Fall Risk Assessment  Has the patient had two or more falls in the last year or any fall with injury in the last year?  No    Safety Assessment  Do you always wear your seatbelt?  Yes  Any changes to home needed to function safely? No  Difficulty hearing.  No  Patient counseled about all safety risks that were identified.    Functional Assessment ADLs  Are there any barriers preventing you from cooking for yourself or meeting nutritional needs?  No.    Are there any barriers preventing you from  driving safely or obtaining transportation?  No.    Are there any barriers preventing you from using a telephone or calling for help?  No    Are there any barriers preventing you from shopping?  No.    Are there any barriers preventing you from taking care of your own finances?  No    Are there any barriers preventing you from managing your medications?  No    Are there any barriers preventing you from showering, bathing or dressing yourself? No    Are there any barriers preventing you from doing housework or laundry? No  Are there any barriers preventing you from using the toilet?No  Are you currently engaging in any exercise or physical activity?  Yes. Yardwork, clean house, walking    Self-Assessment of Health  What is your perception of your health? Good  Do you sleep more than six hours a night? Yes  In the past 7 days, how much did pain keep you from doing your normal work? None  Do you spend quality time with family or friends (virtually or in person)? Yes  Do you usually eat a heart healthy diet that constists of a variety of fruits, vegetables, whole grains and fiber? Yes  Do you eat foods high in fat and/or Fast Food more than three times per week? No    Advance Care Planning  Do you have an Advance Directive, Living Will, Durable Power of , or POLST? Yes      Durable Power of    is on file      Health Maintenance Summary            Ordered - Colorectal Cancer Screening (Colonoscopy - Every 5 Years) Ordered on 2/16/2024 09/25/2023  OCCULT BLOOD FECES IMMUNOASSAY    01/25/2019  REFERRAL TO GI FOR COLONOSCOPY    06/22/2016  OCCULT BLOOD,FECAL,IMMUNOASSAY    11/21/2012  OCCULT BLOOD FECES IMMUNOASSAY    09/12/2008  REFERRAL TO GI FOR COLONOSCOPY    Only the first 5 history entries have been loaded, but more history exists.              Postponed - COVID-19 Vaccine (6 - 2023-24 season) Postponed until 10/10/2024      10/10/2023  Imm Admin: COVID-19 Vaccine, unspecified - HISTORICAL DATA     09/27/2022  Imm Admin: MODERNA BIVALENT BOOSTER SARS-COV-2 VACCINE (6+)    04/08/2022  Imm Admin: MODERNA SARS-COV-2 VACCINE (12+)    10/26/2021  Imm Admin: MODERNA SARS-COV-2 VACCINE (12+)    04/05/2021  Imm Admin: MODERNA SARS-COV-2 VACCINE (12+)    Only the first 5 history entries have been loaded, but more history exists.              Annual Wellness Visit (Yearly) Next due on 2/16/2025 02/16/2024  Level of Service: CT ANNUAL WELLNESS VISIT-INCLUDES PPPS SUBSEQUE*    09/25/2023  Level of Service: ANNUAL WELLNESS VISIT-INCLUDES PPPS SUBSEQUE*    09/25/2023  Visit Dx: Medicare annual wellness visit, subsequent    02/13/2023  Level of Service: CT ANNUAL WELLNESS VISIT-INCLUDES PPPS SUBSEQUE*    02/08/2022  Level of Service: CT ANNUAL WELLNESS VISIT-INCLUDES PPPS SUBSEQUE*    Only the first 5 history entries have been loaded, but more history exists.              IMM DTaP/Tdap/Td Vaccine (2 - Td or Tdap) Next due on 3/14/2027      03/14/2017  Imm Admin: Tdap Vaccine              Pneumococcal Vaccine: 65+ Years (Series Information) Completed      10/08/2018  Imm Admin: Pneumococcal polysaccharide vaccine (PPSV-23)    09/14/2017  Imm Admin: Pneumococcal Conjugate Vaccine (Prevnar/PCV-13)              Zoster (Shingles) Vaccines (Series Information) Completed      08/13/2021  Imm Admin: Zoster Vaccine Recombinant (RZV) (SHINGRIX)    06/09/2021  Imm Admin: Zoster Vaccine Recombinant (RZV) (SHINGRIX)              Hepatitis C Screening  Completed      09/28/2021  Hepatitis C Antibody component of HEP C VIRUS ANTIBODY              Hepatitis B Vaccine (Hep B) (Series Information) Completed      09/25/2023  Imm Admin: Hepatitis B Vaccine (Adol/Adult)    03/22/2023  Imm Admin: Hepatitis B Vaccine (Adol/Adult)    10/25/2022  Imm Admin: Hepatitis B Vaccine (Adol/Adult)              Influenza Vaccine (Series Information) Completed      09/25/2023  Imm Admin: Influenza Vaccine Adult HD    09/06/2022  Imm Admin: Influenza,  "Unspecified - HISTORICAL DATA    10/07/2021  Imm Admin: Influenza, Unspecified - HISTORICAL DATA    09/21/2020  Imm Admin: Influenza Vaccine Adult HD    09/12/2019  Imm Admin: Influenza Vaccine Adult HD    Only the first 5 history entries have been loaded, but more history exists.              Hepatitis A Vaccine (Hep A) (Series Information) Aged Out      No completion history exists for this topic.              HPV Vaccines (Series Information) Aged Out      No completion history exists for this topic.              Polio Vaccine (Inactivated Polio) (Series Information) Aged Out      No completion history exists for this topic.              Meningococcal Immunization (Series Information) Aged Out      No completion history exists for this topic.                    Patient Care Team:  Reinaldo Fenton M.D. as PCP - General (Internal Medicine)  Reinaldo Fenton M.D. as PCP - [facility] Paneled      Financial Resource Strain: Low Risk  (2/16/2024)    Overall Financial Resource Strain (CARDIA)     Difficulty of Paying Living Expenses: Not hard at all      Transportation Needs: No Transportation Needs (2/16/2024)    PRAPARE - Transportation     Lack of Transportation (Medical): No     Lack of Transportation (Non-Medical): No      Food Insecurity: No Food Insecurity (2/16/2024)    Hunger Vital Sign     Worried About Running Out of Food in the Last Year: Never true     Ran Out of Food in the Last Year: Never true        Encounter Vitals  Blood Pressure : 118/60  Weight: (!) 128 kg (283 lb)  Height: 193 cm (6' 4\")  BMI (Calculated): 34.45  Pain Score: No pain     Alert, oriented in no acute distress.  Eye contact is good, speech goal directed, affect calm.    Assessment and Plan. The following treatment and monitoring plan is recommended:  Stage 3b chronic kidney disease (HCC)  Chronic, improving following TURP for obstructive uropathy. Follows with nephrology and urology.     Latest Reference Range & Units 01/04/24 10:35 01/10/24 " 11:12 01/10/24 11:13 01/18/24 08:40 01/31/24 07:38   GFR (CKD-EPI) >60 mL/min/1.73 m 2 16 ! 19 ! 20 ! 28 ! 41 !   !: Data is abnormal    CARLOS treated with BiPAP  Chronic, stable. Pt maintains on BiPAP. Doesn't require any supplemental oxygen. Follow up with pulmonology per routine.    Dyslipidemia  Chronic, stable. Most recent lipid panel from September 2023 WNL. Recommend Mediterranean diet and regular physical activity. Follow up with PCP at least annually for continued monitoring and management.   Lab Results   Component Value Date/Time    CHOLSTRLTOT 138 09/21/2023 06:44 AM    LDL 75 09/21/2023 06:44 AM    HDL 51 09/21/2023 06:44 AM    TRIGLYCERIDE 59 09/21/2023 06:44 AM         Class 1 obesity with serious comorbidity and body mass index (BMI) of 34.0 to 34.9 in adult  Chronic, stable. BMI today 34.45. Associated with HLD and CARLOS. Pt has hx of gastric bypass in 2004. He is down 30-40lb since last year in part due to resolution of fluid retention. His goal is to get back to his baseline at 265lb and is joining a gym. Continue to encouraged calorie conscious diet with regular physical activity. Follow up with PCP at least annually for continued monitoring and management.    Peripheral vascular disease, unspecified (HCC)  Chronic condition, diagnosed via findings on 2023 Quantaflo study. This was followed by an NAKUL which was normal. Pt continues to be asymptomatic. Continue to monitor with PCP.    Benign nodular prostatic hyperplasia with lower urinary tract symptoms  Chronic, improving following recent TURP. Pt maintains on tamsulosin 0.8mg daily. Follow up with urology per routine for continued monitoring and management.    Iron deficiency- due to gastric bypass  Chronic issue, consistent since May 2023 when it has previously been an intermittent issue attributed to hx of gastric bypass though pt questions if it's secondary to recent prostate/kidney issues. Follow up with PCP for continued monitoring and  management   Latest Reference Range & Units 01/31/24 07:38   RBC 4.70 - 6.10 M/uL 3.62 (L)   Hemoglobin 14.0 - 18.0 g/dL 10.8 (L)   Hematocrit 42.0 - 52.0 % 33.9 (L)   (L): Data is abnormally low    Normocytic hypochromic anemia  Chronic issue, consistent since May 2023 when it has previously been an intermittent issue attributed to hx of gastric bypass though pt questions if it's secondary to recent prostate/kidney issues. Follow up with PCP for continued monitoring and management    Latest Reference Range & Units 01/31/24 07:38   RBC 4.70 - 6.10 M/uL 3.62 (L)   Hemoglobin 14.0 - 18.0 g/dL 10.8 (L)   Hematocrit 42.0 - 52.0 % 33.9 (L)   (L): Data is abnormally low    Services suggested: No services needed at this time  Health Care Screening: Age-appropriate preventive services recommended by USPTF and ACIP covered by Medicare were discussed today. Services ordered if indicated and agreed upon by the patient.  Referrals offered: Community-based lifestyle interventions to reduce health risks and promote self-management and wellness, fall prevention, nutrition, physical activity, tobacco-use cessation, weight loss, and mental health services as per orders if indicated.    Discussion today about general wellness and lifestyle habits:    Prevent falls and reduce trip hazards; Cautioned about securing or removing rugs.  Have a working fire alarm and carbon monoxide detector.  Engage in regular physical activity and social activities.    Follow-up: Return for follow up visit with PCP as previously scheduled.

## 2024-02-16 NOTE — ASSESSMENT & PLAN NOTE
Chronic, stable. Pt maintains on BiPAP. Doesn't require any supplemental oxygen. Follow up with pulmonology per routine.

## 2024-02-17 NOTE — ASSESSMENT & PLAN NOTE
Chronic issue, consistent since May 2023 when it has previously been an intermittent issue attributed to hx of gastric bypass though pt questions if it's secondary to recent prostate/kidney issues. Follow up with PCP for continued monitoring and management   Latest Reference Range & Units 01/31/24 07:38   RBC 4.70 - 6.10 M/uL 3.62 (L)   Hemoglobin 14.0 - 18.0 g/dL 10.8 (L)   Hematocrit 42.0 - 52.0 % 33.9 (L)   (L): Data is abnormally low

## 2024-02-17 NOTE — ASSESSMENT & PLAN NOTE
Chronic, improving following recent TURP. Pt maintains on tamsulosin 0.8mg daily. Follow up with urology per routine for continued monitoring and management.

## 2024-04-08 ENCOUNTER — TELEMEDICINE (OUTPATIENT)
Dept: MEDICAL GROUP | Age: 74
End: 2024-04-08
Payer: MEDICARE

## 2024-04-08 VITALS — HEIGHT: 76 IN | BODY MASS INDEX: 33 KG/M2 | WEIGHT: 271 LBS

## 2024-04-08 DIAGNOSIS — R19.5 POSITIVE COLORECTAL CANCER SCREENING USING COLOGUARD TEST: ICD-10-CM

## 2024-04-08 DIAGNOSIS — K63.5 POLYP OF COLON, UNSPECIFIED PART OF COLON, UNSPECIFIED TYPE: ICD-10-CM

## 2024-04-08 DIAGNOSIS — E66.9 OBESITY (BMI 30-39.9): ICD-10-CM

## 2024-04-08 PROCEDURE — 99214 OFFICE O/P EST MOD 30 MIN: CPT | Mod: 95 | Performed by: PHYSICIAN ASSISTANT

## 2024-04-08 RX ORDER — SODIUM BICARBONATE 325 MG/1
1 TABLET ORAL 2 TIMES DAILY
COMMUNITY
End: 2024-04-08

## 2024-04-08 RX ORDER — HYDROCODONE BITARTRATE AND ACETAMINOPHEN 5; 325 MG/1; MG/1
TABLET ORAL
COMMUNITY
End: 2024-04-08

## 2024-04-08 ASSESSMENT — FIBROSIS 4 INDEX: FIB4 SCORE: 1.26

## 2024-04-08 NOTE — PROGRESS NOTES
Virtual Visit: Established Patient   This visit was conducted via Zoom using secure and encrypted videoconferencing technology.   The patient was in their home in the state [facility].    The patient's identity was confirmed and verbal consent was obtained for this virtual visit.     Subjective:   CC:   Chief Complaint   Patient presents with    Results     cologuard     PCP-Dr. Fenton, unable to see today  Jhony Rodriguez is a 73 y.o. male presenting to discuss positive Cologuard.  Did have a colonoscopy in 2019, with polyps, 5-year recall, was due 2/24, for repeat screening.  Cologuard was ordered by provider during comprehensive health exam.  Cologuard was positive.  He was referred to gastroenterology 4/5, referral is still being processed.  He would like to just discuss.  Denies melena, hematochezia, abdominal pain    ROS   See above    Current medicines (including changes today)  Current Outpatient Medications   Medication Sig Dispense Refill    potassium chloride SA (KDUR) 20 MEQ Tab CR TAKE 1 TABLET BY MOUTH EVERY  Tablet 1    furosemide (LASIX) 40 MG Tab TAKE 1 TABLET BY MOUTH EVERY  Tablet 1    Cholecalciferol (DIALYVITE VITAMIN D 5000 PO) Take 5,000 Units by mouth every day.      tamsulosin (FLOMAX) 0.4 MG capsule Take 2 Capsules by mouth 1/2 hour after breakfast. 180 Capsule 3    Cyanocobalamin (B-12) 1000 MCG Tab CR Take 1 Tablet by mouth see administration instructions. 2 times a week 100 Tablet 3     No current facility-administered medications for this visit.       Patient Active Problem List    Diagnosis Date Noted    Obesity (BMI 30-39.9) 04/08/2024    Stage 3b chronic kidney disease 09/25/2023    Normocytic hypochromic anemia 09/25/2023    Obstructive uropathy-postvoid residual greater than 800 at urology office 09/25/2023    Vitamin D insufficiency 06/15/2023    Mild mitral regurgitation by prior echocardiogram 05/31/2023    Mild aortic insufficiency 05/31/2023    Urinary tract  "infection without hematuria 05/31/2023    Grade I hemorrhoids 03/08/2023    Peripheral vascular disease, unspecified (HCC) 02/13/2023    Class 1 obesity with serious comorbidity and body mass index (BMI) of 34.0 to 34.9 in adult 03/12/2020    Edema of both lower legs 03/12/2020    CARLOS treated with BiPAP 09/12/2019    Hypogonadism male 05/22/2019    Vitamin D deficiency 09/14/2017    Iron deficiency- due to gastric bypass 01/19/2017    Benign nodular prostatic hyperplasia with lower urinary tract symptoms 01/19/2017    Vitamin B 12 deficiency- due to gastric bypass 06/20/2016    Gastric bypass status for obesity-2005 dr gallo 11/19/2012    S/P RF ablation operation for arrhythmia-2008; no recurrence; no meds 12/10/2010        Objective:   Ht 1.93 m (6' 4\")   Wt 123 kg (271 lb) Comment: patient stated  BMI 32.99 kg/m²     Physical Exam:  Constitutional: Alert, no distress, well-groomed.  Skin: No rashes in visible areas.  Eye: Round. Conjunctiva clear, lids normal. No icterus.   ENMT: Lips pink without lesions, good dentition, moist mucous membranes. Phonation normal.  Neck: No masses, no thyromegaly. Moves freely without pain.  Respiratory: Unlabored respiratory effort, no cough or audible wheeze  Psych: Alert and oriented x3, normal affect and mood.     Assessment and Plan:   The following treatment plan was discussed:     1. Positive colorectal cancer screening using Cologuard test  Cologuard ordered by provider during comprehensive health exam.  This was positive.  He has already been referred to GI for diagnostic colonoscopy.  Referral still pending.  Advised if by the end of the week he has not been contacted to let us know and we can see where his referral has been sent to.    2. Polyp of colon, unspecified part of colon, unspecified type  Does have prior history of colon polyps, discussed that in the future he is not a candidate for Cologuard, as he is not considered low risk    3. Obesity (BMI 30-39.9)  - " Patient identified as having weight management issue.  Appropriate orders and counseling given.        Follow-up: Return for prn.

## 2024-04-10 ENCOUNTER — TELEPHONE (OUTPATIENT)
Dept: HEALTH INFORMATION MANAGEMENT | Facility: OTHER | Age: 74
End: 2024-04-10

## 2024-04-10 NOTE — TELEPHONE ENCOUNTER
Patient called stating he is in need of a new CPAP machine as his is no longer on the market and it was recalled a few years ago by [employer]. Pt stated that he has not gone to pulmonary in a couple years and the provider he was seeing has left. Pt is not wanting to have to schedule an appointment with pulmonary at this time and is hoping he can just get the orders to get a new CPAP machine submitted. Please advise.

## 2024-04-30 DIAGNOSIS — G47.33 OSA ON CPAP: ICD-10-CM

## 2024-06-04 ENCOUNTER — TELEPHONE (OUTPATIENT)
Dept: MEDICAL GROUP | Age: 74
End: 2024-06-04
Payer: MEDICARE

## 2024-06-04 DIAGNOSIS — E29.1 HYPOGONADISM MALE: ICD-10-CM

## 2024-06-04 DIAGNOSIS — E53.8 VITAMIN B 12 DEFICIENCY: ICD-10-CM

## 2024-06-04 DIAGNOSIS — E61.1 IRON DEFICIENCY: ICD-10-CM

## 2024-06-04 DIAGNOSIS — E61.2 MAGNESIUM DEFICIENCY: ICD-10-CM

## 2024-06-04 DIAGNOSIS — N40.0 BPH WITHOUT URINARY OBSTRUCTION: ICD-10-CM

## 2024-06-04 DIAGNOSIS — E53.1 VITAMIN B6 DEFICIENCY: ICD-10-CM

## 2024-06-04 DIAGNOSIS — E78.5 DYSLIPIDEMIA: ICD-10-CM

## 2024-06-04 DIAGNOSIS — N40.1 BENIGN NODULAR PROSTATIC HYPERPLASIA WITH LOWER URINARY TRACT SYMPTOMS: ICD-10-CM

## 2024-06-04 DIAGNOSIS — E55.9 VITAMIN D INSUFFICIENCY: ICD-10-CM

## 2024-06-04 DIAGNOSIS — Z98.84 GASTRIC BYPASS STATUS FOR OBESITY: ICD-10-CM

## 2024-06-04 DIAGNOSIS — N18.32 STAGE 3B CHRONIC KIDNEY DISEASE: ICD-10-CM

## 2024-06-04 DIAGNOSIS — R73.01 IFG (IMPAIRED FASTING GLUCOSE): ICD-10-CM

## 2024-06-04 NOTE — TELEPHONE ENCOUNTER
Patient called in and left a message stating that he has a new kidney Dr Because the other one left , he states he has been trying to get a hold of someone at that office to get some test ordered before his appointment with them . He wants to know if you can order blood work to see how his kidney function is that way he has something for his appointment with the kidney Dr

## 2024-06-06 ENCOUNTER — TELEPHONE (OUTPATIENT)
Dept: HEALTH INFORMATION MANAGEMENT | Facility: OTHER | Age: 74
End: 2024-06-06
Payer: MEDICARE

## 2024-06-11 ENCOUNTER — HOSPITAL ENCOUNTER (OUTPATIENT)
Dept: LAB | Facility: MEDICAL CENTER | Age: 74
End: 2024-06-11
Attending: INTERNAL MEDICINE
Payer: MEDICARE

## 2024-06-11 DIAGNOSIS — E61.1 IRON DEFICIENCY: ICD-10-CM

## 2024-06-11 DIAGNOSIS — E78.5 DYSLIPIDEMIA: ICD-10-CM

## 2024-06-11 DIAGNOSIS — E53.1 VITAMIN B6 DEFICIENCY: ICD-10-CM

## 2024-06-11 DIAGNOSIS — E55.9 VITAMIN D INSUFFICIENCY: ICD-10-CM

## 2024-06-11 DIAGNOSIS — N40.0 BPH WITHOUT URINARY OBSTRUCTION: ICD-10-CM

## 2024-06-11 DIAGNOSIS — Z98.84 GASTRIC BYPASS STATUS FOR OBESITY: ICD-10-CM

## 2024-06-11 DIAGNOSIS — R73.01 IFG (IMPAIRED FASTING GLUCOSE): ICD-10-CM

## 2024-06-11 DIAGNOSIS — E61.2 MAGNESIUM DEFICIENCY: ICD-10-CM

## 2024-06-11 LAB
25(OH)D3 SERPL-MCNC: 28 NG/ML (ref 30–100)
ALBUMIN SERPL BCP-MCNC: 3.6 G/DL (ref 3.2–4.9)
ALBUMIN/GLOB SERPL: 1.4 G/DL
ALP SERPL-CCNC: 109 U/L (ref 30–99)
ALT SERPL-CCNC: 23 U/L (ref 2–50)
ANION GAP SERPL CALC-SCNC: 10 MMOL/L (ref 7–16)
AST SERPL-CCNC: 20 U/L (ref 12–45)
BASOPHILS # BLD AUTO: 1.2 % (ref 0–1.8)
BASOPHILS # BLD: 0.06 K/UL (ref 0–0.12)
BILIRUB SERPL-MCNC: 0.4 MG/DL (ref 0.1–1.5)
BUN SERPL-MCNC: 25 MG/DL (ref 8–22)
CALCIUM ALBUM COR SERPL-MCNC: 9.5 MG/DL (ref 8.5–10.5)
CALCIUM SERPL-MCNC: 9.2 MG/DL (ref 8.5–10.5)
CHLORIDE SERPL-SCNC: 109 MMOL/L (ref 96–112)
CHOLEST SERPL-MCNC: 148 MG/DL (ref 100–199)
CO2 SERPL-SCNC: 21 MMOL/L (ref 20–33)
CREAT SERPL-MCNC: 1.47 MG/DL (ref 0.5–1.4)
EOSINOPHIL # BLD AUTO: 0.31 K/UL (ref 0–0.51)
EOSINOPHIL NFR BLD: 6.1 % (ref 0–6.9)
ERYTHROCYTE [DISTWIDTH] IN BLOOD BY AUTOMATED COUNT: 44.1 FL (ref 35.9–50)
EST. AVERAGE GLUCOSE BLD GHB EST-MCNC: 103 MG/DL
FASTING STATUS PATIENT QL REPORTED: NORMAL
FERRITIN SERPL-MCNC: 32.6 NG/ML (ref 22–322)
GFR SERPLBLD CREATININE-BSD FMLA CKD-EPI: 50 ML/MIN/1.73 M 2
GLOBULIN SER CALC-MCNC: 2.6 G/DL (ref 1.9–3.5)
GLUCOSE SERPL-MCNC: 88 MG/DL (ref 65–99)
HBA1C MFR BLD: 5.2 % (ref 4–5.6)
HCT VFR BLD AUTO: 39.9 % (ref 42–52)
HDLC SERPL-MCNC: 56 MG/DL
HGB BLD-MCNC: 12.7 G/DL (ref 14–18)
IMM GRANULOCYTES # BLD AUTO: 0.01 K/UL (ref 0–0.11)
IMM GRANULOCYTES NFR BLD AUTO: 0.2 % (ref 0–0.9)
IRON SATN MFR SERPL: 19 % (ref 15–55)
IRON SERPL-MCNC: 55 UG/DL (ref 50–180)
LDLC SERPL CALC-MCNC: 79 MG/DL
LYMPHOCYTES # BLD AUTO: 0.73 K/UL (ref 1–4.8)
LYMPHOCYTES NFR BLD: 14.3 % (ref 22–41)
MAGNESIUM SERPL-MCNC: 2.2 MG/DL (ref 1.5–2.5)
MCH RBC QN AUTO: 28.5 PG (ref 27–33)
MCHC RBC AUTO-ENTMCNC: 31.8 G/DL (ref 32.3–36.5)
MCV RBC AUTO: 89.5 FL (ref 81.4–97.8)
MONOCYTES # BLD AUTO: 0.31 K/UL (ref 0–0.85)
MONOCYTES NFR BLD AUTO: 6.1 % (ref 0–13.4)
NEUTROPHILS # BLD AUTO: 3.69 K/UL (ref 1.82–7.42)
NEUTROPHILS NFR BLD: 72.1 % (ref 44–72)
NRBC # BLD AUTO: 0 K/UL
NRBC BLD-RTO: 0 /100 WBC (ref 0–0.2)
PLATELET # BLD AUTO: 249 K/UL (ref 164–446)
PMV BLD AUTO: 10.8 FL (ref 9–12.9)
POTASSIUM SERPL-SCNC: 4.9 MMOL/L (ref 3.6–5.5)
PROT SERPL-MCNC: 6.2 G/DL (ref 6–8.2)
PSA SERPL-MCNC: 0.07 NG/ML (ref 0–4)
RBC # BLD AUTO: 4.46 M/UL (ref 4.7–6.1)
SODIUM SERPL-SCNC: 140 MMOL/L (ref 135–145)
TIBC SERPL-MCNC: 284 UG/DL (ref 250–450)
TRIGL SERPL-MCNC: 63 MG/DL (ref 0–149)
TSH SERPL DL<=0.005 MIU/L-ACNC: 3.49 UIU/ML (ref 0.38–5.33)
UIBC SERPL-MCNC: 229 UG/DL (ref 110–370)
WBC # BLD AUTO: 5.1 K/UL (ref 4.8–10.8)

## 2024-06-11 PROCEDURE — 85025 COMPLETE CBC W/AUTO DIFF WBC: CPT

## 2024-06-11 PROCEDURE — 80053 COMPREHEN METABOLIC PANEL: CPT

## 2024-06-11 PROCEDURE — 83036 HEMOGLOBIN GLYCOSYLATED A1C: CPT

## 2024-06-11 PROCEDURE — 82306 VITAMIN D 25 HYDROXY: CPT

## 2024-06-11 PROCEDURE — 82728 ASSAY OF FERRITIN: CPT

## 2024-06-11 PROCEDURE — 36415 COLL VENOUS BLD VENIPUNCTURE: CPT

## 2024-06-11 PROCEDURE — 83540 ASSAY OF IRON: CPT

## 2024-06-11 PROCEDURE — 80061 LIPID PANEL: CPT

## 2024-06-11 PROCEDURE — 84153 ASSAY OF PSA TOTAL: CPT

## 2024-06-11 PROCEDURE — 83735 ASSAY OF MAGNESIUM: CPT

## 2024-06-11 PROCEDURE — 84207 ASSAY OF VITAMIN B-6: CPT

## 2024-06-11 PROCEDURE — 84443 ASSAY THYROID STIM HORMONE: CPT

## 2024-06-11 PROCEDURE — 83550 IRON BINDING TEST: CPT

## 2024-06-14 ENCOUNTER — OFFICE VISIT (OUTPATIENT)
Dept: NEPHROLOGY | Facility: MEDICAL CENTER | Age: 74
End: 2024-06-14
Payer: MEDICARE

## 2024-06-14 VITALS
DIASTOLIC BLOOD PRESSURE: 74 MMHG | OXYGEN SATURATION: 97 % | TEMPERATURE: 97.7 F | WEIGHT: 272 LBS | HEART RATE: 55 BPM | HEIGHT: 76 IN | RESPIRATION RATE: 18 BRPM | BODY MASS INDEX: 33.12 KG/M2 | SYSTOLIC BLOOD PRESSURE: 124 MMHG

## 2024-06-14 DIAGNOSIS — R60.0 EDEMA OF BOTH LOWER LEGS: ICD-10-CM

## 2024-06-14 DIAGNOSIS — N18.31 STAGE 3A CHRONIC KIDNEY DISEASE: ICD-10-CM

## 2024-06-14 DIAGNOSIS — D64.9 ANEMIA, UNSPECIFIED TYPE: ICD-10-CM

## 2024-06-14 DIAGNOSIS — Z98.84 GASTRIC BYPASS STATUS FOR OBESITY: ICD-10-CM

## 2024-06-14 DIAGNOSIS — N40.1 BENIGN NODULAR PROSTATIC HYPERPLASIA WITH LOWER URINARY TRACT SYMPTOMS: ICD-10-CM

## 2024-06-14 DIAGNOSIS — E55.9 VITAMIN D DEFICIENCY: ICD-10-CM

## 2024-06-14 LAB — VIT B6 SERPL-MCNC: 33.9 NMOL/L (ref 20–125)

## 2024-06-14 PROCEDURE — G2211 COMPLEX E/M VISIT ADD ON: HCPCS | Performed by: INTERNAL MEDICINE

## 2024-06-14 PROCEDURE — 99214 OFFICE O/P EST MOD 30 MIN: CPT | Performed by: INTERNAL MEDICINE

## 2024-06-14 PROCEDURE — 3074F SYST BP LT 130 MM HG: CPT | Performed by: INTERNAL MEDICINE

## 2024-06-14 PROCEDURE — 3078F DIAST BP <80 MM HG: CPT | Performed by: INTERNAL MEDICINE

## 2024-06-14 RX ORDER — ERGOCALCIFEROL 1.25 MG/1
50000 CAPSULE ORAL
Qty: 12 CAPSULE | Refills: 0 | Status: SHIPPED | OUTPATIENT
Start: 2024-06-14

## 2024-06-14 RX ORDER — FUROSEMIDE 80 MG
80 TABLET ORAL DAILY
Qty: 100 TABLET | Refills: 1 | Status: SHIPPED | OUTPATIENT
Start: 2024-06-14

## 2024-06-14 ASSESSMENT — ENCOUNTER SYMPTOMS
FEVER: 0
ABDOMINAL PAIN: 0
SHORTNESS OF BREATH: 0

## 2024-06-14 ASSESSMENT — FIBROSIS 4 INDEX: FIB4 SCORE: 1.19

## 2024-06-14 NOTE — PROGRESS NOTES
Chief Complaint   Patient presents with    Chronic Kidney Disease       CC: follow up CKD    HPI:  Jhony Rodriguez is a 73 y.o. male with a history of obesity s/p RYGB, BPH s/p TURP 1/2024, CKD3 who presents today for follow up nephrology care.  He was previously seen by Dr. Chu.    Re: Obesity. He says his maximum weight was between 480-500 lbs. He admits he ate bad food. He had RYGB in 2004 with Dr. Garcia. He denies history of kidney stones. He drinks 1/2 gallon of water per day.     Re: BPH. Started having LUTS in 10/2023. He saw urology and was told he had BPH. He had TURP in 1/2024 with Dr. Cazares. Since surgery, he has been able to urinate well.     Re: CKD. He was born full term. No history of kidney stones. He had CT with BPH obstruction 1/2024 but did not require dialysis. He rarely takes NSAIDs. He takes lasix 40mg a few times a week, but does not feel diuretic effect.         Past Medical History:   Diagnosis Date    Amnesia, global, transient- 2008, resolved; no recurrence; possibly due to due to medication interaction- celebrex and mobic   01/19/2017    IMO load March 2020    Arthritis     osteo knees, hips    Atrial fibrillation (HCC) 2007    resolved with Ablation; [facility]    Bilateral leg edema 03/12/2020    BMI 40.0-44.9, adult (HCC) 02/08/2022    Dyslipidemia     H/O cardiac radiofrequency ablation 2006    Hip pain, right     History of atrial fibrillation 05/20/2020    History of obstructive sleep apnea- resolved with weight loss 06/20/2016    Hypertension     Obesity     Obesity (BMI 30-39.9) 02/13/2018    Primary osteoarthritis of right hip- THR tbd 5/2020-n dr barraza 05/23/2019    Prophylactic antibiotic- for dental work due to TKRs 09/14/2017    S/P total knee arthroplasty, bilateral 04/13/2020    Sleep apnea     BIPAP    Snoring     has had sleep study no cpap       Past Surgical History:   Procedure Laterality Date    TURP-VAPOR  01/15/2024    ID TOTAL HIP ARTHROPLASTY  Right 05/20/2020    Procedure: ARTHROPLASTY, HIP, TOTAL;  Surgeon: Connor Fonseca M.D.;  Location: SURGERY [facility];  Service: Orthopedics    KNEE ARTHROPLASTY TOTAL Left 05/05/2015    Procedure: KNEE ARTHROPLASTY TOTAL ;  Surgeon: Mike Michele M.D.;  Location: SURGERY [facility];  Service:     KNEE ARTHROPLASTY TOTAL  01/06/2015    Performed by Mike Michele M.D. at SURGERY [facility]    KNEE ARTHROPLASTY TOTAL Bilateral 2015    4 months apart    INGUINAL HERNIA REPAIR  08/16/2013    Performed by Martin Dillon M.D. at SURGERY [facility]    LAPAROTOMY  2006    bowel perforation repair, following gastric  bypass    KNEE ARTHROSCOPY Left 2006    left knee meniscectomy; dr Michele    ORIF, WRIST Right 2005    GASTRIC BYPASS LAPAROSCOPIC  2004    dr. gallo    ARTHROSCOPY, KNEE          Outpatient Encounter Medications as of 6/14/2024   Medication Sig Dispense Refill    vitamin D2, Ergocalciferol, (DRISDOL) 1.25 MG (83519 UT) Cap capsule Take 1 Capsule by mouth every 7 days. 12 Capsule 0    furosemide (LASIX) 80 MG Tab Take 1 Tablet by mouth every day. 100 Tablet 1    Misc. Devices Misc CPAP machine by DME company of choice.  Same parameters as previously.  Patient will have to consult with the DME company to let them know what primaries he was on.  Because I have no idea.  Otherwise he will have to go back to pulmonary to get a new prescription. 1 Each 1    Cholecalciferol (DIALYVITE VITAMIN D 5000 PO) Take 5,000 Units by mouth every day.      Cyanocobalamin (B-12) 1000 MCG Tab CR Take 1 Tablet by mouth see administration instructions. 2 times a week 100 Tablet 3    [DISCONTINUED] potassium chloride SA (KDUR) 20 MEQ Tab CR TAKE 1 TABLET BY MOUTH EVERY  Tablet 1    [DISCONTINUED] furosemide (LASIX) 40 MG Tab TAKE 1 TABLET BY MOUTH EVERY  Tablet 1    [DISCONTINUED] tamsulosin (FLOMAX) 0.4 MG capsule Take 2 Capsules by mouth 1/2 hour after breakfast. 180 Capsule 3     No  facility-administered encounter medications on file as of 6/14/2024.        Allergies   Allergen Reactions    Meloxicam      alterred mentation    Penicillins Rash and Swelling     Rash  Tolerated cefazolin 5/5/15       Social History     Socioeconomic History    Marital status:      Spouse name: Not on file    Number of children: Not on file    Years of education: Not on file    Highest education level: Bachelor's degree (e.g., BA, AB, BS)   Occupational History    Not on file   Tobacco Use    Smoking status: Never    Smokeless tobacco: Never   Vaping Use    Vaping status: Never Used   Substance and Sexual Activity    Alcohol use: Yes     Alcohol/week: 1.2 oz     Types: 1 Glasses of wine, 1 Cans of beer per week     Comment: one per month    Drug use: No    Sexual activity: Not Currently     Partners: Female   Other Topics Concern    Not on file   Social History Narrative    Not on file     Social Determinants of Health     Financial Resource Strain: Low Risk  (2/16/2024)    Overall Financial Resource Strain (CARDIA)     Difficulty of Paying Living Expenses: Not hard at all   Food Insecurity: No Food Insecurity (2/16/2024)    Hunger Vital Sign     Worried About Running Out of Food in the Last Year: Never true     Ran Out of Food in the Last Year: Never true   Transportation Needs: No Transportation Needs (2/16/2024)    PRAPARE - Transportation     Lack of Transportation (Medical): No     Lack of Transportation (Non-Medical): No   Physical Activity: Insufficiently Active (9/22/2023)    Exercise Vital Sign     Days of Exercise per Week: 2 days     Minutes of Exercise per Session: 60 min   Stress: No Stress Concern Present (9/22/2023)    [employer] - Occupational Stress Questionnaire     Feeling of Stress : Only a little   Social Connections: Unknown (9/22/2023)    Social Connection and Isolation Panel [NHANES]     Frequency of Communication with Friends and Family: More than three  "times a week     Frequency of Social Gatherings with Friends and Family: More than three times a week     Attends Yazidi Services: Patient declined     Active Member of Clubs or Organizations: No     Attends Club or Organization Meetings: Patient declined     Marital Status:    Intimate Partner Violence: Not on file   Housing Stability: Low Risk  (9/22/2023)    Housing Stability Vital Sign     Unable to Pay for Housing in the Last Year: No     Number of Places Lived in the Last Year: 1     Unstable Housing in the Last Year: No       Family History   Problem Relation Age of Onset    Thyroid Mother     Heart Disease Mother     Heart Disease Brother     Cancer Maternal Aunt     Cancer Maternal Uncle     Cancer Maternal Grandfather     Sleep Apnea Neg Hx     Kidney Disease Neg Hx        Review of Systems   Constitutional:  Negative for fever.   Respiratory:  Negative for shortness of breath.    Cardiovascular:  Negative for chest pain.   Gastrointestinal:  Negative for abdominal pain.   Genitourinary:  Negative for dysuria and hematuria.   All other systems reviewed and are negative.      /74 (BP Location: Left arm, Patient Position: Sitting, BP Cuff Size: Adult)   Pulse (!) 55   Temp 36.5 °C (97.7 °F) (Temporal)   Resp 18   Ht 1.93 m (6' 4\")   Wt 123 kg (272 lb)   SpO2 97%   BMI 33.11 kg/m²     Physical Exam  Constitutional:       General: He is not in acute distress.  HENT:      Mouth/Throat:      Pharynx: No oropharyngeal exudate.   Eyes:      General: No scleral icterus.  Neck:      Trachea: No tracheal deviation.   Cardiovascular:      Rate and Rhythm: Normal rate and regular rhythm.      Heart sounds: Normal heart sounds. No murmur heard.  Pulmonary:      Effort: Pulmonary effort is normal.      Breath sounds: Normal breath sounds. No stridor. No rales.   Abdominal:      General: Bowel sounds are normal.      Palpations: Abdomen is soft.      Tenderness: There is no abdominal tenderness. "   Musculoskeletal:         General: Normal range of motion.      Cervical back: Neck supple.      Right lower leg: Edema (2+) present.      Left lower leg: Edema (2+) present.   Skin:     General: Skin is warm and dry.      Findings: No rash.   Neurological:      General: No focal deficit present.      Mental Status: He is alert and oriented to person, place, and time.   Psychiatric:         Mood and Affect: Mood and affect normal.         Behavior: Behavior normal.           Labs reviewed.    Recent Labs     06/29/23  0910 09/21/23  0644 11/20/23  0638 01/18/24  0840 01/31/24  0738 06/11/24  0639   ALBUMIN 4.0 3.7  --   --   --  3.6   HDL  --  51  --   --   --  56   TRIGLYCERIDE  --  59  --   --   --  63   SODIUM 141 143   < > 143 141 140   POTASSIUM 5.3 4.8   < > 4.9 4.6 4.9   CHLORIDE 107 113*   < > 107 108 109   CO2 23 20   < > 24 24 21   BUN 27* 30*   < > 41* 30* 25*   CREATININE 1.55* 1.97*   < > 2.43* 1.76* 1.47*    < > = values in this interval not displayed.       Lab Results   Component Value Date/Time    WBC 5.1 06/11/2024 06:39 AM    WBC 5.8 12/06/2010 08:03 AM    RBC 4.46 (L) 06/11/2024 06:39 AM    RBC 4.97 12/06/2010 08:03 AM    HEMOGLOBIN 12.7 (L) 06/11/2024 06:39 AM    HEMATOCRIT 39.9 (L) 06/11/2024 06:39 AM    MCV 89.5 06/11/2024 06:39 AM    MCV 85 12/06/2010 08:03 AM    MCH 28.5 06/11/2024 06:39 AM    MCH 28.0 12/06/2010 08:03 AM    MCHC 31.8 (L) 06/11/2024 06:39 AM    MPV 10.8 06/11/2024 06:39 AM             URINALYSIS:  Lab Results   Component Value Date/Time    COLORURINE Yellow 01/31/2024 0739    CLARITY Clear 01/31/2024 0739    SPECGRAVITY 1.010 01/31/2024 0739    PHURINE 6.0 01/31/2024 0739    KETONES Negative 01/31/2024 0739    PROTEINURIN Negative 01/31/2024 0739    BILIRUBINUR Negative 01/31/2024 0739    UROBILU 0.2 01/31/2024 0739    NITRITE Negative 01/31/2024 0739    LEUKESTERAS Trace (A) 01/31/2024 0739    OCCULTBLOOD Small (A) 01/31/2024 0739     UPC  Lab Results   Component Value  Date/Time    TOTPROTUR <4.0 11/20/2023 0639      Lab Results   Component Value Date/Time    CREATININEU 42.73 11/20/2023 0639         Imaging report(s) reviewed  No orders to display         Assessment:  Jhony Rodriguez is a 73 y.o. male with a history of obesity s/p RYGB, BPH s/p TURP 1/2024, CKD3 who presents today for follow up nephrology care.  He was previously seen by Dr. Chu.    Plan:  1. Stage 3a chronic kidney disease  - Underlying CKD likely from CT from obstructive uropathy in late 2023/ early 2024, age-related kidney decline.  I recommend avoiding NSAIDs and other nephrotoxins.  I recommend a low-salt diet.  I explained the importance of blood pressure control to help slow CKD progression.  If hypertension worsens, I would recommend starting ACE inhibitor or ARB.    2. Benign nodular prostatic hyperplasia with lower urinary tract symptoms  -Symptoms well-controlled after TURP surgery January 2024.  Patient no longer on tamsulosin.    3. Gastric bypass status for obesity-2005 dr gallo  -Patient denies history of kidney stones.  He is at risk for oxalate nephropathy with history of gastric bypass.  I recommend that he drink at least 2 L of liquid per day, preferably water.    4. Vitamin D deficiency  -I will prescribe ergocalciferol 50,000 units by mouth weekly for 12 weeks.  Upon completion, I recommend resuming over-the-counter vitamin D 5000 units daily.    5. Anemia, unspecified type  -Mild, and with mild iron deficiency.  I recommend he increase his intake of dark leafy green vegetables, with vitamin C or lemon juice to help absorption.  If anemia and iron deficiency worsen, I would recommend starting ferrous sulfate 325 mg p.o. daily.    6.  Hypertension  - Mildly uncontrolled, with lower extremity edema.  I recommend increasing Lasix from 40 mg to 80 mg daily, as patient did not have urinary response to 40 mg dose.  If patient becomes euvolemic, and maintains euvolemia off of Lasix, he  could theoretically discontinue Lasix or take it only as needed.  If hypertension persists despite patient reaching euvolemia, would recommend starting ACE inhibitor or ARB.      Return to clinic in 6 months with pre-clinic labs.    Tello Zayas MD  Nephrology  [facility]

## 2024-07-19 ENCOUNTER — OFFICE VISIT (OUTPATIENT)
Dept: SLEEP MEDICINE | Facility: MEDICAL CENTER | Age: 74
End: 2024-07-19
Attending: NURSE PRACTITIONER
Payer: MEDICARE

## 2024-07-19 VITALS
HEIGHT: 76 IN | OXYGEN SATURATION: 97 % | DIASTOLIC BLOOD PRESSURE: 66 MMHG | SYSTOLIC BLOOD PRESSURE: 118 MMHG | RESPIRATION RATE: 16 BRPM | WEIGHT: 270 LBS | HEART RATE: 46 BPM | BODY MASS INDEX: 32.88 KG/M2

## 2024-07-19 DIAGNOSIS — G47.33 OSA (OBSTRUCTIVE SLEEP APNEA): ICD-10-CM

## 2024-07-19 PROCEDURE — 3078F DIAST BP <80 MM HG: CPT | Performed by: NURSE PRACTITIONER

## 2024-07-19 PROCEDURE — 3074F SYST BP LT 130 MM HG: CPT | Performed by: NURSE PRACTITIONER

## 2024-07-19 PROCEDURE — 99213 OFFICE O/P EST LOW 20 MIN: CPT | Performed by: NURSE PRACTITIONER

## 2024-07-19 PROCEDURE — 99214 OFFICE O/P EST MOD 30 MIN: CPT | Performed by: NURSE PRACTITIONER

## 2024-07-19 ASSESSMENT — FIBROSIS 4 INDEX: FIB4 SCORE: 1.21

## 2024-08-07 ENCOUNTER — TELEPHONE (OUTPATIENT)
Dept: HEALTH INFORMATION MANAGEMENT | Facility: OTHER | Age: 74
End: 2024-08-07
Payer: MEDICARE

## 2024-08-07 DIAGNOSIS — L30.9 ECZEMA, UNSPECIFIED TYPE: ICD-10-CM

## 2024-08-07 NOTE — TELEPHONE ENCOUNTER
1. Caller Name: Jhony Rodriguez       Call Back Number: [phone]      How would the patient prefer to be contacted with a response: Phone call OK to leave a detailed message    2. SPECIFIC Action To Be Taken: Referral pending, please sign.    3. Diagnosis/Clinical Reason for Request: Patient states he has itchy patches on his skin (neck and chest) for the past few months that he is wanting to see a dermatologist for if possible. Please advise if referral can be placed or if an appointment with you is required.    4. Specialty & Provider Name/Lab/Imaging Location: Dermatology    5. Is appointment scheduled for requested order/referral: no    Patient was informed they will receive a return phone call from the office ONLY if there are any questions before processing their request. Advised to call back if they haven't received a call from the referral department in 5 days.

## 2024-08-12 ENCOUNTER — OFFICE VISIT (OUTPATIENT)
Dept: DERMATOLOGY | Facility: IMAGING CENTER | Age: 74
End: 2024-08-12
Payer: MEDICARE

## 2024-08-12 DIAGNOSIS — L30.9 DERMATITIS: ICD-10-CM

## 2024-08-12 PROCEDURE — 99203 OFFICE O/P NEW LOW 30 MIN: CPT | Performed by: STUDENT IN AN ORGANIZED HEALTH CARE EDUCATION/TRAINING PROGRAM

## 2024-08-12 RX ORDER — CLOBETASOL PROPIONATE 0.5 MG/G
1 CREAM TOPICAL 2 TIMES DAILY
Qty: 45 G | Refills: 2 | Status: SHIPPED | OUTPATIENT
Start: 2024-08-12

## 2024-08-12 NOTE — PROGRESS NOTES
[facility] Dermatology Clinic Note    CC:   Chief Complaint   Patient presents with    Rash     Rash neck and chest x 1 week        HPI:  Jhony Rodriguez is a 74 y.o. male who presents today as new patient for evaluation and management of    Rash on neck and chest   - has been going on for the last few weeks, first started on his left neck and then moved to other areas of his neck and chest   - has been doing yardwork, some exposure to tree branches/brush in this area  - cortisone has helped with itching, no other treatments tried.       ROS: no fevers/chills. Other pertinent positives and negatives as above.     Meds/PMH/PSH/FamHx/Allergies: reviewed relevant to my specialty in the chart.       PHYSICAL EXAM:   A focused skin exam was completed of the neck, chest, back with the following pertinent findings listed below.   Remaining above-listed examined areas within normal limits / negative for rashes or lesions.    Lichenified pink scaly plaques on bilateral neck, left chest   Few linear pink scaly papules and excoriations on chest, left arm          IMPRESSION / PLAN:    Dermatitis, NOS -- favor allergic contact dermatitis, vs other irritant/LSC. Ddx includes psoriasis.   - start topical clobetasol 0.05% ointment BID to lichenified or itchy areas on neck, chest  - recommend protective clothing (like bandana) when outside and exposed to brush/plants   - recommend gentle skin care products, avoid scrubbing with soap in shower, avoid other topical products       Follow up:  4-6 weeks for rash.        Lizzy Woodson MD   [facility]

## 2024-09-03 ENCOUNTER — TELEPHONE (OUTPATIENT)
Dept: SLEEP MEDICINE | Facility: MEDICAL CENTER | Age: 74
End: 2024-09-03
Payer: MEDICARE

## 2024-09-03 NOTE — TELEPHONE ENCOUNTER
Manuela from [facility] reached out stating they have made 4 attempts to reach patient to get him set up on his Bipap and mask and supplies and have not been able to reach him. I contacted patient 09/03/2024 @ 10:00 Am lvm relying this information. Advised patient if he had question to reach out to us and or his DME company.

## 2024-11-18 ENCOUNTER — TELEPHONE (OUTPATIENT)
Dept: HEALTH INFORMATION MANAGEMENT | Facility: OTHER | Age: 74
End: 2024-11-18
Payer: MEDICARE

## 2024-11-27 ENCOUNTER — OFFICE VISIT (OUTPATIENT)
Dept: MEDICAL GROUP | Age: 74
End: 2024-11-27
Payer: MEDICARE

## 2024-11-27 VITALS
SYSTOLIC BLOOD PRESSURE: 112 MMHG | TEMPERATURE: 97.7 F | WEIGHT: 270 LBS | HEART RATE: 50 BPM | DIASTOLIC BLOOD PRESSURE: 60 MMHG | OXYGEN SATURATION: 96 % | BODY MASS INDEX: 32.88 KG/M2 | HEIGHT: 76 IN

## 2024-11-27 DIAGNOSIS — R60.0 EDEMA OF BOTH LOWER LEGS: ICD-10-CM

## 2024-11-27 DIAGNOSIS — N18.31 STAGE 3A CHRONIC KIDNEY DISEASE: ICD-10-CM

## 2024-11-27 DIAGNOSIS — N40.1 BENIGN NODULAR PROSTATIC HYPERPLASIA WITH LOWER URINARY TRACT SYMPTOMS: ICD-10-CM

## 2024-11-27 DIAGNOSIS — G47.33 OBSTRUCTIVE SLEEP APNEA SYNDROME: ICD-10-CM

## 2024-11-27 DIAGNOSIS — E66.811 CLASS 1 OBESITY DUE TO EXCESS CALORIES WITH SERIOUS COMORBIDITY AND BODY MASS INDEX (BMI) OF 32.0 TO 32.9 IN ADULT: ICD-10-CM

## 2024-11-27 DIAGNOSIS — Z98.84 GASTRIC BYPASS STATUS FOR OBESITY: ICD-10-CM

## 2024-11-27 DIAGNOSIS — E66.09 CLASS 1 OBESITY DUE TO EXCESS CALORIES WITH SERIOUS COMORBIDITY AND BODY MASS INDEX (BMI) OF 32.0 TO 32.9 IN ADULT: ICD-10-CM

## 2024-11-27 DIAGNOSIS — E53.8 VITAMIN B 12 DEFICIENCY: ICD-10-CM

## 2024-11-27 DIAGNOSIS — Z98.890 S/P RF ABLATION OPERATION FOR ARRHYTHMIA: ICD-10-CM

## 2024-11-27 DIAGNOSIS — Z86.79 S/P RF ABLATION OPERATION FOR ARRHYTHMIA: ICD-10-CM

## 2024-11-27 PROBLEM — N39.0 URINARY TRACT INFECTION WITHOUT HEMATURIA: Status: RESOLVED | Noted: 2023-05-31 | Resolved: 2024-11-27

## 2024-11-27 PROBLEM — N13.9 OBSTRUCTIVE UROPATHY: Status: RESOLVED | Noted: 2023-09-25 | Resolved: 2024-11-27

## 2024-11-27 PROBLEM — E66.9 OBESITY (BMI 30-39.9): Status: RESOLVED | Noted: 2024-04-08 | Resolved: 2024-11-27

## 2024-11-27 PROBLEM — E55.9 VITAMIN D DEFICIENCY: Status: RESOLVED | Noted: 2017-09-14 | Resolved: 2024-11-27

## 2024-11-27 PROCEDURE — 3078F DIAST BP <80 MM HG: CPT | Performed by: STUDENT IN AN ORGANIZED HEALTH CARE EDUCATION/TRAINING PROGRAM

## 2024-11-27 PROCEDURE — 3074F SYST BP LT 130 MM HG: CPT | Performed by: STUDENT IN AN ORGANIZED HEALTH CARE EDUCATION/TRAINING PROGRAM

## 2024-11-27 PROCEDURE — 99214 OFFICE O/P EST MOD 30 MIN: CPT | Performed by: STUDENT IN AN ORGANIZED HEALTH CARE EDUCATION/TRAINING PROGRAM

## 2024-11-27 ASSESSMENT — ENCOUNTER SYMPTOMS
BACK PAIN: 0
TINGLING: 0
EYE PAIN: 0
DEPRESSION: 0
TREMORS: 0
HEMOPTYSIS: 0
ORTHOPNEA: 0
BLOOD IN STOOL: 0
MYALGIAS: 0
NECK PAIN: 0
SORE THROAT: 0
HEADACHES: 0
CHILLS: 0
FEVER: 0
VOMITING: 0
DIARRHEA: 0
NAUSEA: 0
ABDOMINAL PAIN: 0
BLURRED VISION: 0
SHORTNESS OF BREATH: 0
COUGH: 0
PALPITATIONS: 0
DIZZINESS: 0
WEIGHT LOSS: 0
CONSTIPATION: 0
EYE DISCHARGE: 0

## 2024-11-27 ASSESSMENT — FIBROSIS 4 INDEX: FIB4 SCORE: 1.21

## 2024-11-27 NOTE — PROGRESS NOTES
Verbal consent was acquired by the patient to use [employer] ambient listening note generation during this visit     Subjective:     HPI:   History of Present Illness  The patient is a 74-year-old male who presents for evaluation of multiple medical concerns.    He has a history of allergies to penicillin and meloxicam, which manifest as rashes.    He underwent a successful Transurethral Resection of the Prostate (TURP) earlier this year and was prescribed Lasix for edema. This medication also contributed to significant weight loss, reducing his weight from approximately 300 pounds to 255 pounds. He has a history of gastric bypass surgery.    He reports no history of peripheral vascular disease or varicose veins. He has experienced urinary retention and kidney infections in the past. He is currently under the care of a nephrologist and reports that his condition has improved. He is able to urinate without difficulty.    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 He has been diagnosed with arthritis in both knees and his right hip but currently reports no issues. He has experienced swelling in his ankles and knees, which he attributes to fluid retention.    He has used BiPAP for obstructive sleep apnea in the past but finds it less necessary when his weight is under control.    SOCIAL HISTORY  He is retired.    ALLERGIES  He is allergic to PENICILLIN and MELOXICAM.    Health Maintenance: Completed    Review of Systems   Constitutional:  Negative for chills, fever, malaise/fatigue and weight loss.   HENT:  Negative for congestion, ear pain, hearing loss and sore throat.    Eyes:  Negative for blurred vision, pain and discharge.   Respiratory:  Negative for cough, hemoptysis and shortness of breath.    Cardiovascular:  Negative for chest pain, palpitations and orthopnea.   Gastrointestinal:  Negative for abdominal pain, blood in stool, constipation, diarrhea,  "melena, nausea and vomiting.   Genitourinary:  Negative for dysuria, frequency, hematuria and urgency.   Musculoskeletal:  Negative for back pain, joint pain, myalgias and neck pain.   Skin:  Negative for rash.   Neurological:  Negative for dizziness, tingling, tremors and headaches.   Psychiatric/Behavioral:  Negative for depression and suicidal ideas.          Objective:     Exam:  /60 (BP Location: Left arm, Patient Position: Sitting, BP Cuff Size: Large adult)   Pulse (!) 50   Temp 36.5 °C (97.7 °F) (Temporal)   Ht 1.93 m (6' 4\")   Wt 122 kg (270 lb)   SpO2 96%   BMI 32.87 kg/m²  Body mass index is 32.87 kg/m².    Physical Exam  Constitutional:       General: He is not in acute distress.     Appearance: Normal appearance. He is normal weight. He is not ill-appearing, toxic-appearing or diaphoretic.   HENT:      Head: Normocephalic and atraumatic.      Right Ear: External ear normal.      Left Ear: External ear normal.      Nose: Nose normal.      Mouth/Throat:      Mouth: Mucous membranes are moist.      Pharynx: Oropharynx is clear.   Eyes:      General:         Right eye: No discharge.         Left eye: No discharge.      Extraocular Movements: Extraocular movements intact.      Conjunctiva/sclera: Conjunctivae normal.      Pupils: Pupils are equal, round, and reactive to light.   Cardiovascular:      Rate and Rhythm: Normal rate and regular rhythm.      Pulses: Normal pulses.      Heart sounds: Normal heart sounds. No murmur heard.  Pulmonary:      Effort: No respiratory distress.      Breath sounds: No stridor. No wheezing.   Abdominal:      General: Abdomen is flat. Bowel sounds are normal. There is no distension.      Palpations: Abdomen is soft.      Tenderness: There is no abdominal tenderness. There is no right CVA tenderness or guarding.   Musculoskeletal:         General: No swelling, tenderness, deformity or signs of injury. Normal range of motion.      Cervical back: Normal range of " motion and neck supple. No rigidity or tenderness.      Right lower leg: No edema.      Left lower leg: No edema.   Lymphadenopathy:      Cervical: No cervical adenopathy.   Skin:     General: Skin is warm and dry.      Capillary Refill: Capillary refill takes less than 2 seconds.      Coloration: Skin is not jaundiced or pale.      Findings: No bruising, erythema, lesion or rash.   Neurological:      General: No focal deficit present.      Mental Status: He is alert and oriented to person, place, and time. Mental status is at baseline.      Cranial Nerves: No cranial nerve deficit.      Sensory: No sensory deficit.      Motor: No weakness.      Coordination: Coordination normal.      Gait: Gait normal.      Deep Tendon Reflexes: Reflexes normal.   Psychiatric:         Mood and Affect: Mood normal.         Behavior: Behavior normal.         Thought Content: Thought content normal.         Judgment: Judgment normal.             Results  Laboratory Studies  Estimated GFR is 50. Cholesterol levels are good. Iron levels are good.    Imaging  Echocardiogram showed normal left ventricular function, mild mitral, mild valve valvular insufficiencies.    Assessment & Plan:     1. Benign nodular prostatic hyperplasia with lower urinary tract symptoms        2. Class 1 obesity due to excess calories with serious comorbidity and body mass index (BMI) of 32.0 to 32.9 in adult        3. Edema of both lower legs        4. Gastric bypass status for obesity-2005 dr gallo        5. S/P RF ablation operation for arrhythmia-2008; no recurrence; no meds        6. Obstructive sleep apnea syndrome        7. Stage 3a chronic kidney disease        8. Vitamin B 12 deficiency- due to gastric bypass            Assessment & Plan  1. Peripheral vascular disease.  He had a duplex scan for the arteries about 1-1/2 years ago, which did not reveal any significant issues. He experiences occasional swelling in his ankles and knees, likely due to fluid  retention from prolonged sitting. His echocardiogram from 2022 showed normal left ventricular function and mild valvular insufficiencies. His cholesterol and blood pressure are within normal ranges. His iron levels are also satisfactory. He will continue to monitor his symptoms and maintain his current management plan.    2. Benign prostatic hyperplasia (BPH).  He underwent a TURP procedure earlier this year, which was successful. He had some edema prior to the procedure, for which he was prescribed Lasix. He has lost weight since the procedure, which has also helped reduce his symptoms. He will continue to monitor his urinary function and follow up with his urologist as needed.    3. Obstructive sleep apnea.  He previously used a BiPAP machine but has found it less necessary as he has lost weight. His CPAP machine was discontinued due to material issues, and he has not replaced it. He reports that his snoring has decreased, and his wife has not noticed any significant issues. He will continue to monitor his symptoms and consider replacing the CPAP machine if needed.    4. History of gastric bypass.  He underwent a gastric bypass procedure in the 1980s, which helped him lose a significant amount of weight. He will continue to monitor his weight and maintain a healthy diet to prevent further complications.    5. History of hip and knee replacements.  He had a hip replacement about 3-4 years ago and both knees replaced due to arthritis from previous football injuries. He reports that his left hip was acting up but is currently stable. He will continue to monitor his joint health and maintain a healthy weight to prevent further issues.    6. 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 Followed by Nephro.               Return in about 3 months (around 2/27/2025) for lab review.    Please note that this dictation was created using voice recognition software. I have  made every reasonable attempt to correct obvious errors, but I expect that there are errors of grammar and possibly content that I did not discover before finalizing the note.

## 2024-12-05 ENCOUNTER — HOSPITAL ENCOUNTER (OUTPATIENT)
Dept: LAB | Facility: MEDICAL CENTER | Age: 74
End: 2024-12-05
Attending: INTERNAL MEDICINE
Payer: MEDICARE

## 2024-12-05 DIAGNOSIS — N40.1 BENIGN NODULAR PROSTATIC HYPERPLASIA WITH LOWER URINARY TRACT SYMPTOMS: ICD-10-CM

## 2024-12-05 DIAGNOSIS — Z98.84 GASTRIC BYPASS STATUS FOR OBESITY: ICD-10-CM

## 2024-12-05 DIAGNOSIS — N18.31 STAGE 3A CHRONIC KIDNEY DISEASE: ICD-10-CM

## 2024-12-05 DIAGNOSIS — D64.9 ANEMIA, UNSPECIFIED TYPE: ICD-10-CM

## 2024-12-05 DIAGNOSIS — E55.9 VITAMIN D DEFICIENCY: ICD-10-CM

## 2024-12-05 LAB
APPEARANCE UR: ABNORMAL
BACTERIA #/AREA URNS HPF: NORMAL /HPF
BILIRUB UR QL STRIP.AUTO: NEGATIVE
CASTS URNS QL MICRO: NORMAL /LPF (ref 0–2)
COLOR UR: YELLOW
EPITHELIAL CELLS 1715: NORMAL /HPF (ref 0–5)
ERYTHROCYTE [DISTWIDTH] IN BLOOD BY AUTOMATED COUNT: 43.6 FL (ref 35.9–50)
GLUCOSE UR STRIP.AUTO-MCNC: NEGATIVE MG/DL
HCT VFR BLD AUTO: 43.6 % (ref 42–52)
HGB BLD-MCNC: 13.4 G/DL (ref 14–18)
KETONES UR STRIP.AUTO-MCNC: NEGATIVE MG/DL
LEUKOCYTE ESTERASE UR QL STRIP.AUTO: NEGATIVE
MCH RBC QN AUTO: 27.7 PG (ref 27–33)
MCHC RBC AUTO-ENTMCNC: 30.7 G/DL (ref 32.3–36.5)
MCV RBC AUTO: 90.3 FL (ref 81.4–97.8)
MICRO URNS: ABNORMAL
NITRITE UR QL STRIP.AUTO: NEGATIVE
PH UR STRIP.AUTO: 6.5 [PH] (ref 5–8)
PLATELET # BLD AUTO: 269 K/UL (ref 164–446)
PMV BLD AUTO: 10.6 FL (ref 9–12.9)
PROT UR QL STRIP: NEGATIVE MG/DL
RBC # BLD AUTO: 4.83 M/UL (ref 4.7–6.1)
RBC # URNS HPF: NORMAL /HPF (ref 0–2)
RBC UR QL AUTO: NEGATIVE
SP GR UR STRIP.AUTO: 1.02
UROBILINOGEN UR STRIP.AUTO-MCNC: 1 EU/DL
WBC # BLD AUTO: 6.1 K/UL (ref 4.8–10.8)
WBC #/AREA URNS HPF: NORMAL /HPF

## 2024-12-05 PROCEDURE — 82306 VITAMIN D 25 HYDROXY: CPT

## 2024-12-05 PROCEDURE — 80048 BASIC METABOLIC PNL TOTAL CA: CPT

## 2024-12-05 PROCEDURE — 82040 ASSAY OF SERUM ALBUMIN: CPT

## 2024-12-05 PROCEDURE — 82043 UR ALBUMIN QUANTITATIVE: CPT

## 2024-12-05 PROCEDURE — 83970 ASSAY OF PARATHORMONE: CPT

## 2024-12-05 PROCEDURE — 81001 URINALYSIS AUTO W/SCOPE: CPT

## 2024-12-05 PROCEDURE — 85027 COMPLETE CBC AUTOMATED: CPT

## 2024-12-05 PROCEDURE — 83540 ASSAY OF IRON: CPT

## 2024-12-05 PROCEDURE — 84100 ASSAY OF PHOSPHORUS: CPT

## 2024-12-05 PROCEDURE — 83550 IRON BINDING TEST: CPT

## 2024-12-05 PROCEDURE — 84156 ASSAY OF PROTEIN URINE: CPT

## 2024-12-05 PROCEDURE — 36415 COLL VENOUS BLD VENIPUNCTURE: CPT

## 2024-12-05 PROCEDURE — 82570 ASSAY OF URINE CREATININE: CPT | Mod: 91

## 2024-12-05 PROCEDURE — 82728 ASSAY OF FERRITIN: CPT

## 2024-12-06 LAB
25(OH)D3 SERPL-MCNC: 32 NG/ML (ref 30–100)
ALBUMIN SERPL BCP-MCNC: 3.8 G/DL (ref 3.2–4.9)
ANION GAP SERPL CALC-SCNC: 7 MMOL/L (ref 7–16)
BUN SERPL-MCNC: 27 MG/DL (ref 8–22)
CALCIUM SERPL-MCNC: 9.1 MG/DL (ref 8.5–10.5)
CHLORIDE SERPL-SCNC: 109 MMOL/L (ref 96–112)
CO2 SERPL-SCNC: 25 MMOL/L (ref 20–33)
CREAT SERPL-MCNC: 1.46 MG/DL (ref 0.5–1.4)
CREAT UR-MCNC: 91.5 MG/DL
FERRITIN SERPL-MCNC: 19 NG/ML (ref 22–322)
GFR SERPLBLD CREATININE-BSD FMLA CKD-EPI: 51 ML/MIN/1.73 M 2
GLUCOSE SERPL-MCNC: 94 MG/DL (ref 65–99)
IRON SATN MFR SERPL: 13 % (ref 15–55)
IRON SERPL-MCNC: 47 UG/DL (ref 50–180)
PHOSPHATE SERPL-MCNC: 3.5 MG/DL (ref 2.5–4.5)
POTASSIUM SERPL-SCNC: 5.1 MMOL/L (ref 3.6–5.5)
PROT UR-MCNC: 8.7 MG/DL (ref 0–15)
PROT/CREAT UR: 95 MG/G (ref 15–68)
PTH-INTACT SERPL-MCNC: 152 PG/ML (ref 14–72)
SODIUM SERPL-SCNC: 141 MMOL/L (ref 135–145)
TIBC SERPL-MCNC: 370 UG/DL (ref 250–450)
UIBC SERPL-MCNC: 323 UG/DL (ref 110–370)

## 2024-12-09 LAB
CREAT UR-MCNC: 80.4 MG/DL
MICROALBUMIN UR-MCNC: <1.2 MG/DL
MICROALBUMIN/CREAT UR: NORMAL MG/G (ref 0–30)

## 2024-12-12 ENCOUNTER — OFFICE VISIT (OUTPATIENT)
Dept: NEPHROLOGY | Facility: MEDICAL CENTER | Age: 74
End: 2024-12-12
Payer: MEDICARE

## 2024-12-12 VITALS
OXYGEN SATURATION: 95 % | SYSTOLIC BLOOD PRESSURE: 114 MMHG | HEIGHT: 77 IN | HEART RATE: 55 BPM | BODY MASS INDEX: 31.41 KG/M2 | WEIGHT: 266 LBS | DIASTOLIC BLOOD PRESSURE: 60 MMHG | TEMPERATURE: 98.4 F

## 2024-12-12 DIAGNOSIS — N25.81 SECONDARY HYPERPARATHYROIDISM (HCC): ICD-10-CM

## 2024-12-12 DIAGNOSIS — Z98.84 GASTRIC BYPASS STATUS FOR OBESITY: ICD-10-CM

## 2024-12-12 DIAGNOSIS — N18.31 STAGE 3A CHRONIC KIDNEY DISEASE: ICD-10-CM

## 2024-12-12 DIAGNOSIS — Z87.440 HISTORY OF UTI: ICD-10-CM

## 2024-12-12 DIAGNOSIS — E61.1 IRON DEFICIENCY: ICD-10-CM

## 2024-12-12 PROCEDURE — 3078F DIAST BP <80 MM HG: CPT | Performed by: INTERNAL MEDICINE

## 2024-12-12 PROCEDURE — G2211 COMPLEX E/M VISIT ADD ON: HCPCS | Performed by: INTERNAL MEDICINE

## 2024-12-12 PROCEDURE — 3074F SYST BP LT 130 MM HG: CPT | Performed by: INTERNAL MEDICINE

## 2024-12-12 PROCEDURE — 99214 OFFICE O/P EST MOD 30 MIN: CPT | Performed by: INTERNAL MEDICINE

## 2024-12-12 ASSESSMENT — FIBROSIS 4 INDEX: FIB4 SCORE: 1.12

## 2024-12-12 ASSESSMENT — ENCOUNTER SYMPTOMS
ABDOMINAL PAIN: 0
FEVER: 0
SHORTNESS OF BREATH: 0

## 2024-12-12 NOTE — PROGRESS NOTES
Chief Complaint   Patient presents with    Chronic Kidney Disease       CC: follow up CKD    HPI:  Jhony Rodriguez is a 74 y.o. male with a history of obesity s/p RYGB, BPH s/p TURP 1/2024, CKD3 who presents today for follow up nephrology care.  He was previously seen by Dr. Chu.    Re: Obesity. He says his maximum weight was between 480-500 lbs. He admits he ate bad food. He had RYGB in 2004 with Dr. Garcia. He denies history of kidney stones. He still drinks 1/2 - 1 gallon of water per day.     Re: BPH. Started having LUTS in 10/2023. He saw urology and was told he had BPH. He had TURP in 1/2024 with Dr. Cazares. Since surgery, he has been able to urinate well. Urine stream remains strong, has 1x nocturia.     Re: CKD. He was born full term. No history of kidney stones. He had CT with BPH obstruction 1/2024 but did not require dialysis. He rarely takes NSAIDs.      Re: HTN. He is prescribed lasix 80mg daily. He notices that he urinates more when he does take lasix. He takes the lasix every other day. He denies LE edema. He checks BP at home, usually in 110's/ 60's.         Past Medical History:   Diagnosis Date    Amnesia, global, transient- 2008, resolved; no recurrence; possibly due to due to medication interaction- celebrex and mobic   01/19/2017    IMO load March 2020    Arthritis     osteo knees, hips    Atrial fibrillation (HCC) 2007    resolved with Ablation; [facility]    Bilateral leg edema 03/12/2020    BMI 40.0-44.9, adult (HCC) 02/08/2022    Dyslipidemia     H/O cardiac radiofrequency ablation 2006    Hip pain, right     History of atrial fibrillation 05/20/2020    History of obstructive sleep apnea- resolved with weight loss 06/20/2016    Hypertension     Obesity     Obesity (BMI 30-39.9) 02/13/2018    Primary osteoarthritis of right hip- THR tbd 5/2020-n dr barraza 05/23/2019    Prophylactic antibiotic- for dental work due to TKRs 09/14/2017    S/P total knee arthroplasty, bilateral  04/13/2020    Sleep apnea     BIPAP    Snoring     has had sleep study no cpap       Past Surgical History:   Procedure Laterality Date    TURP-VAPOR  01/15/2024    AK TOTAL HIP ARTHROPLASTY Right 05/20/2020    Procedure: ARTHROPLASTY, HIP, TOTAL;  Surgeon: Connor Fonseca M.D.;  Location: SURGERY [facility];  Service: Orthopedics    KNEE ARTHROPLASTY TOTAL Left 05/05/2015    Procedure: KNEE ARTHROPLASTY TOTAL ;  Surgeon: Mike Michele M.D.;  Location: SURGERY [facility];  Service:     KNEE ARTHROPLASTY TOTAL  01/06/2015    Performed by Mike Michele M.D. at SURGERY [facility]    KNEE ARTHROPLASTY TOTAL Bilateral 2015    4 months apart    INGUINAL HERNIA REPAIR  08/16/2013    Performed by Martin Dillon M.D. at SURGERY [facility]    LAPAROTOMY  2006    bowel perforation repair, following gastric  bypass    KNEE ARTHROSCOPY Left 2006    left knee meniscectomy; dr Michele    ORIF, WRIST Right 2005    GASTRIC BYPASS LAPAROSCOPIC  2004    dr. gallo    ARTHROSCOPY, KNEE          Outpatient Encounter Medications as of 12/12/2024   Medication Sig Dispense Refill    clobetasol (TEMOVATE) 0.05 % Cream Apply 1 Application topically 2 times a day. 45 g 2    vitamin D2, Ergocalciferol, (DRISDOL) 1.25 MG (61506 UT) Cap capsule Take 1 Capsule by mouth every 7 days. 12 Capsule 0    furosemide (LASIX) 80 MG Tab Take 1 Tablet by mouth every day. 100 Tablet 1    Misc. Devices Misc CPAP machine by DME company of choice.  Same parameters as previously.  Patient will have to consult with the DME company to let them know what primaries he was on.  Because I have no idea.  Otherwise he will have to go back to pulmonary to get a new prescription. 1 Each 1    Cholecalciferol (DIALYVITE VITAMIN D 5000 PO) Take 5,000 Units by mouth every day.      Cyanocobalamin (B-12) 1000 MCG Tab CR Take 1 Tablet by mouth see administration instructions. 2 times a week 100 Tablet 3     No facility-administered encounter  "medications on file as of 12/12/2024.        Allergies   Allergen Reactions    Meloxicam      alterred mentation    Penicillins Rash and Swelling     Rash  Tolerated cefazolin 5/5/15             Review of Systems   Constitutional:  Negative for fever.   Respiratory:  Negative for shortness of breath.    Cardiovascular:  Negative for chest pain.   Gastrointestinal:  Negative for abdominal pain.   Genitourinary:  Negative for dysuria and hematuria.   All other systems reviewed and are negative.      /60 (BP Location: Right arm, Patient Position: Sitting, BP Cuff Size: Adult)   Pulse (!) 55   Temp 36.9 °C (98.4 °F) (Temporal)   Ht 1.943 m (6' 4.5\")   Wt 121 kg (266 lb)   SpO2 95%   BMI 31.96 kg/m²     Physical Exam  Constitutional:       General: He is not in acute distress.  HENT:      Mouth/Throat:      Pharynx: No oropharyngeal exudate.   Eyes:      General: No scleral icterus.  Neck:      Trachea: No tracheal deviation.   Cardiovascular:      Rate and Rhythm: Normal rate and regular rhythm.      Heart sounds: Normal heart sounds. No murmur heard.  Pulmonary:      Effort: Pulmonary effort is normal.      Breath sounds: Normal breath sounds. No stridor. No rales.   Abdominal:      General: Bowel sounds are normal.      Palpations: Abdomen is soft.      Tenderness: There is no abdominal tenderness.   Musculoskeletal:         General: Normal range of motion.      Cervical back: Neck supple.      Right lower leg: Edema (1+) present.      Left lower leg: Edema (1+) present.   Skin:     General: Skin is warm and dry.      Findings: No rash.   Neurological:      General: No focal deficit present.      Mental Status: He is alert and oriented to person, place, and time.   Psychiatric:         Mood and Affect: Mood and affect normal.         Behavior: Behavior normal.         Labs reviewed.    Recent Labs     01/31/24  0738 06/11/24  0639 12/05/24  0703   ALBUMIN  --  3.6 3.8   HDL  --  56  --    TRIGLYCERIDE  --  " 63  --    SODIUM 141 140 141   POTASSIUM 4.6 4.9 5.1   CHLORIDE 108 109 109   CO2 24 21 25   BUN 30* 25* 27*   CREATININE 1.76* 1.47* 1.46*   PHOSPHORUS  --   --  3.5       Lab Results   Component Value Date/Time    WBC 6.1 12/05/2024 07:03 AM    WBC 5.8 12/06/2010 08:03 AM    RBC 4.83 12/05/2024 07:03 AM    RBC 4.97 12/06/2010 08:03 AM    HEMOGLOBIN 13.4 (L) 12/05/2024 07:03 AM    HEMATOCRIT 43.6 12/05/2024 07:03 AM    MCV 90.3 12/05/2024 07:03 AM    MCV 85 12/06/2010 08:03 AM    MCH 27.7 12/05/2024 07:03 AM    MCH 28.0 12/06/2010 08:03 AM    MCHC 30.7 (L) 12/05/2024 07:03 AM    MPV 10.6 12/05/2024 07:03 AM             URINALYSIS:  Lab Results   Component Value Date/Time    COLORURINE Yellow 12/05/2024 0703    CLARITY Cloudy (A) 12/05/2024 0703    SPECGRAVITY 1.016 12/05/2024 0703    PHURINE 6.5 12/05/2024 0703    KETONES Negative 12/05/2024 0703    PROTEINURIN Negative 12/05/2024 0703    BILIRUBINUR Negative 12/05/2024 0703    UROBILU 1.0 12/05/2024 0703    NITRITE Negative 12/05/2024 0703    LEUKESTERAS Negative 12/05/2024 0703    OCCULTBLOOD Negative 12/05/2024 0703       UPC  Lab Results   Component Value Date/Time    TOTPROTUR 8.7 12/05/2024 0703      Lab Results   Component Value Date/Time    CREATININEU 91.50 12/05/2024 0703           Imaging report(s) reviewed  No orders to display         Assessment:  Jhony Rodriguez is a 74 y.o. male with a history of obesity s/p RYGB, BPH s/p TURP 1/2024, CKD3 who presents today for follow up nephrology care.     Plan:  1. Stage 3a chronic kidney disease  -Creatinine and GFR are stable within stage IIIa CKD.  Underlying CKD likely from CT from obstructive uropathy in late 2023/ early 2024, age-related kidney decline, and at risk for oxalate nephropathy from history of RYGB.  I recommend avoiding NSAIDs and other nephrotoxins.  I recommend a whole food, plant-based, low-salt diet.  I explained the importance of blood pressure control to help slow CKD progression.   If hypertension worsens, I would recommend starting ACE inhibitor or ARB.    KFRE 2-Year: 0.3% at 12/5/2024  7:03 AM  Calculated from:  Serum Creatinine: 1.46 mg/dL at 12/5/2024  7:03 AM  Urine Albumin Creatinine Ratio: 56 mg/g at 1/31/2024  7:38 AM  Age: 74 years  Sex: Male at 12/5/2024  7:03 AM  Has CKD-3 to CKD-5: Yes     KFRE 5-Year: 1% at 12/5/2024  7:03 AM  Calculated from:  Serum Creatinine: 1.46 mg/dL at 12/5/2024  7:03 AM  Urine Albumin Creatinine Ratio: 56 mg/g at 1/31/2024  7:38 AM  Age: 74 years  Sex: Male at 12/5/2024  7:03 AM  Has CKD-3 to CKD-5: Yes      2. Benign nodular prostatic hyperplasia with lower urinary tract symptoms  -Symptoms well-controlled after TURP surgery January 2024.  Patient remains off of tamsulosin.    3. Gastric bypass status for obesity-2005 dr gallo  -Patient denies history of kidney stones.  He remains at risk for oxalate nephropathy with history of gastric bypass.  I recommend that he drink at least 2 L of liquid per day, preferably water.    4. Vitamin D deficiency  -Improved after 12-week course of ergocalciferol, I recommend over-the-counter maintenance vitamin D 5000 units daily.    5. Anemia, unspecified type  -Mild, and with mild iron deficiency.  I recommend increasing intake of beans and dark leafy green vegetables, with lemon juice (vitamin C) to help improve absorption of iron.  If iron deficiency anemia worsens, I would recommend ferrous sulfate 325 mg p.o. daily.    6.  Hypertension  - Currently very well-controlled on Lasix 80 mg every other day.  If hypertension worsens, I would recommend starting ACE inhibitor or ARB.      Return to clinic in 12 months with pre-clinic labs.    Tello Zayas MD  Nephrology  [facility]

## 2024-12-23 ENCOUNTER — TELEPHONE (OUTPATIENT)
Dept: FAMILY PLANNING/WOMEN'S HEALTH CLINIC | Facility: PHYSICIAN GROUP | Age: 74
End: 2024-12-23
Payer: MEDICARE

## 2024-12-23 NOTE — TELEPHONE ENCOUNTER
I called and LVM for patient advising him  to callback to reschedule her appointment on 01/21/25 to a different time due to a staff meeting that will take place at the same time of his schedule appointment. SCP  number was provided for callback.

## 2025-01-17 ASSESSMENT — PATIENT HEALTH QUESTIONNAIRE - PHQ9
2. FEELING DOWN, DEPRESSED, IRRITABLE, OR HOPELESS: NOT AT ALL
1. LITTLE INTEREST OR PLEASURE IN DOING THINGS: NOT AT ALL

## 2025-01-17 ASSESSMENT — ACTIVITIES OF DAILY LIVING (ADL): BATHING_REQUIRES_ASSISTANCE: 0

## 2025-01-17 ASSESSMENT — ENCOUNTER SYMPTOMS: GENERAL WELL-BEING: GOOD

## 2025-01-20 PROBLEM — I73.9 PERIPHERAL VASCULAR DISEASE, UNSPECIFIED (HCC): Status: RESOLVED | Noted: 2023-02-13 | Resolved: 2025-01-20

## 2025-01-20 NOTE — ASSESSMENT & PLAN NOTE
Chronic, stable. He takes lasix 80 mg qod. Reports this has been helpful. No known heart failure or venous insufficiency. He denies SOB or PENN. Follow up with PCP at least annually for continued monitoring and management.

## 2025-01-20 NOTE — ASSESSMENT & PLAN NOTE
Chronic, stable. Secondary to CKD. He does see Dr. Zayas with Nephrology. PTH was 152 in Dec 2024. He is on vitamin d3. Follow up with nephrology as previously scheduled.

## 2025-01-20 NOTE — ASSESSMENT & PLAN NOTE
Chronic, stable. He does not require BiPAP nor a CPAP anymore since losing weight. He reports he sleeps good and wakes up feeling well rested. Follow up with PCP at least annually for continued monitoring and management.

## 2025-01-20 NOTE — ASSESSMENT & PLAN NOTE
Chronic, stable. Last GFR in Dec 2024 was 51. He does follow with nephrology. We discussed avoiding nephrotoxic medication such as NSAIDs as well as maintaining adequate hydration. Follow up with PCP for continued monitoring.

## 2025-01-21 ENCOUNTER — OFFICE VISIT (OUTPATIENT)
Dept: FAMILY PLANNING/WOMEN'S HEALTH CLINIC | Facility: PHYSICIAN GROUP | Age: 75
End: 2025-01-21
Payer: MEDICARE

## 2025-01-21 VITALS
WEIGHT: 278 LBS | DIASTOLIC BLOOD PRESSURE: 70 MMHG | BODY MASS INDEX: 33.85 KG/M2 | OXYGEN SATURATION: 99 % | HEIGHT: 76 IN | SYSTOLIC BLOOD PRESSURE: 112 MMHG | HEART RATE: 52 BPM

## 2025-01-21 DIAGNOSIS — R60.0 EDEMA OF BOTH LOWER LEGS: ICD-10-CM

## 2025-01-21 DIAGNOSIS — N18.31 STAGE 3A CHRONIC KIDNEY DISEASE: ICD-10-CM

## 2025-01-21 DIAGNOSIS — G47.33 OBSTRUCTIVE SLEEP APNEA SYNDROME: ICD-10-CM

## 2025-01-21 DIAGNOSIS — N25.81 SECONDARY HYPERPARATHYROIDISM (HCC): ICD-10-CM

## 2025-01-21 PROBLEM — I73.9 PERIPHERAL VASCULAR DISEASE, UNSPECIFIED (HCC): Status: RESOLVED | Noted: 2023-02-13 | Resolved: 2025-01-21

## 2025-01-21 PROCEDURE — 1126F AMNT PAIN NOTED NONE PRSNT: CPT

## 2025-01-21 PROCEDURE — 3074F SYST BP LT 130 MM HG: CPT

## 2025-01-21 PROCEDURE — 3078F DIAST BP <80 MM HG: CPT

## 2025-01-21 PROCEDURE — G0439 PPPS, SUBSEQ VISIT: HCPCS

## 2025-01-21 SDOH — ECONOMIC STABILITY: HOUSING INSECURITY: AT ANY TIME IN THE PAST 12 MONTHS, WERE YOU HOMELESS OR LIVING IN A SHELTER (INCLUDING NOW)?: NO

## 2025-01-21 SDOH — ECONOMIC STABILITY: FOOD INSECURITY: HOW HARD IS IT FOR YOU TO PAY FOR THE VERY BASICS LIKE FOOD, HOUSING, MEDICAL CARE, AND HEATING?: NOT HARD AT ALL

## 2025-01-21 SDOH — ECONOMIC STABILITY: FOOD INSECURITY: WITHIN THE PAST 12 MONTHS, YOU WORRIED THAT YOUR FOOD WOULD RUN OUT BEFORE YOU GOT THE MONEY TO BUY MORE.: NEVER TRUE

## 2025-01-21 SDOH — ECONOMIC STABILITY: HOUSING INSECURITY: IN THE LAST 12 MONTHS, WAS THERE A TIME WHEN YOU WERE NOT ABLE TO PAY THE MORTGAGE OR RENT ON TIME?: NO

## 2025-01-21 SDOH — ECONOMIC STABILITY: FOOD INSECURITY: WITHIN THE PAST 12 MONTHS, THE FOOD YOU BOUGHT JUST DIDN'T LAST AND YOU DIDN'T HAVE MONEY TO GET MORE.: NEVER TRUE

## 2025-01-21 SDOH — ECONOMIC STABILITY: HOUSING INSECURITY: IN THE PAST 12 MONTHS, HOW MANY TIMES HAVE YOU MOVED WHERE YOU WERE LIVING?: 1

## 2025-01-21 SDOH — ECONOMIC STABILITY: TRANSPORTATION INSECURITY: IN THE PAST 12 MONTHS, HAS LACK OF TRANSPORTATION KEPT YOU FROM MEDICAL APPOINTMENTS OR FROM GETTING MEDICATIONS?: NO

## 2025-01-21 ASSESSMENT — PAIN SCALES - GENERAL: PAINLEVEL_OUTOF10: NO PAIN

## 2025-01-21 ASSESSMENT — FIBROSIS 4 INDEX: FIB4 SCORE: 1.12

## 2025-01-21 ASSESSMENT — PATIENT HEALTH QUESTIONNAIRE - PHQ9: CLINICAL INTERPRETATION OF PHQ2 SCORE: 0

## 2025-01-21 ASSESSMENT — ACTIVITIES OF DAILY LIVING (ADL): LACK_OF_TRANSPORTATION: NO

## 2025-01-21 NOTE — PROGRESS NOTES
Comprehensive Health Assessment Program     Jhony Rodriguez is a 74 y.o. here for his comprehensive health assessment.    Patient Active Problem List    Diagnosis Date Noted    Secondary hyperparathyroidism (HCC) 12/12/2024    Stage 3a chronic kidney disease 09/25/2023    Normocytic hypochromic anemia 09/25/2023    Vitamin D insufficiency 06/15/2023    Mild mitral regurgitation by prior echocardiogram 05/31/2023    Mild aortic insufficiency 05/31/2023    Grade I hemorrhoids 03/08/2023    Class 1 obesity with serious comorbidity and body mass index (BMI) of 32.0 to 32.9 in adult 03/12/2020    Edema of both lower legs 03/12/2020    Obstructive sleep apnea syndrome 09/12/2019    Hypogonadism male 05/22/2019    Iron deficiency- due to gastric bypass 01/19/2017    Benign nodular prostatic hyperplasia with lower urinary tract symptoms 01/19/2017    Vitamin B 12 deficiency- due to gastric bypass 06/20/2016    Gastric bypass status for obesity-2005 dr gallo 11/19/2012    S/P RF ablation operation for arrhythmia-2008; no recurrence; no meds 12/10/2010       Current Outpatient Medications   Medication Sig Dispense Refill    clobetasol (TEMOVATE) 0.05 % Cream Apply 1 Application topically 2 times a day. 45 g 2    furosemide (LASIX) 80 MG Tab Take 1 Tablet by mouth every day. 100 Tablet 1    Cholecalciferol (DIALYVITE VITAMIN D 5000 PO) Take 5,000 Units by mouth every day.      Cyanocobalamin (B-12) 1000 MCG Tab CR Take 1 Tablet by mouth see administration instructions. 2 times a week 100 Tablet 3    Misc. Devices Misc CPAP machine by DME company of choice.  Same parameters as previously.  Patient will have to consult with the DME company to let them know what primaries he was on.  Because I have no idea.  Otherwise he will have to go back to pulmonary to get a new prescription. (Patient not taking: Reported on 1/21/2025) 1 Each 1     No current facility-administered medications for this visit.          Current  supplements as per medication list.     Allergies:   Meloxicam and Penicillins  Social History     Tobacco Use    Smoking status: Never    Smokeless tobacco: Never   Vaping Use    Vaping status: Never Used   Substance Use Topics    Alcohol use: Yes     Alcohol/week: 1.2 oz     Types: 1 Glasses of wine, 1 Cans of beer per week     Comment: one per month    Drug use: No     Family History   Problem Relation Age of Onset    Thyroid Mother     Heart Disease Mother     Heart Disease Brother     Cancer Maternal Aunt     Cancer Maternal Uncle     Cancer Maternal Grandfather     Sleep Apnea Neg Hx     Kidney Disease Neg Hx      Jhony  has a past medical history of Amnesia, global, transient- 2008, resolved; no recurrence; possibly due to due to medication interaction- celebrex and mobic   (01/19/2017), Arthritis, Atrial fibrillation ([facility]) (2007), Bilateral leg edema (03/12/2020), BMI 40.0-44.9, adult ([facility]) (02/08/2022), Dyslipidemia, H/O cardiac radiofrequency ablation (2006), Hip pain, right, History of atrial fibrillation (05/20/2020), History of obstructive sleep apnea- resolved with weight loss (06/20/2016), Hypertension, Obesity, Obesity (BMI 30-39.9) (02/13/2018), Peripheral vascular disease, unspecified ([facility]) (02/13/2023), Primary osteoarthritis of right hip- THR tbd 5/2020-n dr barraza (05/23/2019), Prophylactic antibiotic- for dental work due to TKRs (09/14/2017), S/P total knee arthroplasty, bilateral (04/13/2020), Sleep apnea, and Snoring.   Past Surgical History:   Procedure Laterality Date    TURP-VAPOR  01/15/2024    IA TOTAL HIP ARTHROPLASTY Right 05/20/2020    Procedure: ARTHROPLASTY, HIP, TOTAL;  Surgeon: Connor Fonseca M.D.;  Location: SURGERY [facility] ORS;  Service: Orthopedics    KNEE ARTHROPLASTY TOTAL Left 05/05/2015    Procedure: KNEE ARTHROPLASTY TOTAL ;  Surgeon: Mike Michele M.D.;  Location: SURGERY [facility];  Service:     KNEE ARTHROPLASTY TOTAL  01/06/2015    Performed by  Mike Michele M.D. at SURGERY [facility]    KNEE ARTHROPLASTY TOTAL Bilateral 2015    4 months apart    INGUINAL HERNIA REPAIR  08/16/2013    Performed by Martin Dillon M.D. at SURGERY [facility]    LAPAROTOMY  2006    bowel perforation repair, following gastric  bypass    KNEE ARTHROSCOPY Left 2006    left knee meniscectomy; dr Michele    ORIF, WRIST Right 2005    GASTRIC BYPASS LAPAROSCOPIC  2004    dr. gallo    ARTHROSCOPY, KNEE         Screening:  In the last six months have you experienced any leakage of urine? No    Depression Screening  Little interest or pleasure in doing things?  0 - not at all  Feeling down, depressed , or hopeless? 0 - not at all  Patient Health Questionnaire Score: 0     If depressive symptoms identified deferred to follow up visit unless specifically addressed in assessment and plan.    Interpretation of PHQ-9 Total Score   Score Severity   1-4 No Depression   5-9 Mild Depression   10-14 Moderate Depression   15-19 Moderately Severe Depression   20-27 Severe Depression    Screening for Cognitive Impairment  Do you or any of your friends or family members have any concern about your memory? No  Three Minute Recall (Leader, Season, Table) 3/3    Jose clock face with all 12 numbers and set the hands to show 10 minutes after 11.  Yes    Cognitive concerns identified deferred for follow up unless specifically addressed in assessment and plan.    Fall Risk Assessment  Has the patient had two or more falls in the last year or any fall with injury in the last year?  No    Safety Assessment  Do you always wear your seatbelt?  Yes  Any changes to home needed to function safely? No  Difficulty hearing.  No  Patient counseled about all safety risks that were identified.    Functional Assessment ADLs  Are there any barriers preventing you from cooking for yourself or meeting nutritional needs?  No.    Are there any barriers preventing you from driving safely or obtaining  transportation?  No.    Are there any barriers preventing you from using a telephone or calling for help?  No    Are there any barriers preventing you from shopping?  No.    Are there any barriers preventing you from taking care of your own finances?  No    Are there any barriers preventing you from managing your medications?  No    Are there any barriers preventing you from showering, bathing or dressing yourself? No    Are there any barriers preventing you from doing housework or laundry? No  Are there any barriers preventing you from using the toilet?No  Are you currently engaging in any exercise or physical activity?  Yes.  Walking, yard work when weather permits.     Self-Assessment of Health  What is your perception of your health? Good    Do you sleep more than six hours a night? Yes  Not using CPAP  In the past 7 days, how much did pain keep you from doing your normal work? None    Do you spend quality time with family or friends (virtually or in person)? Yes    Do you usually eat a heart healthy diet that constists of a variety of fruits, vegetables, whole grains and fiber? Yes    Do you eat foods high in fat and/or Fast Food more than three times per week? No    How concerned are you that your medical conditions are not being well managed? Not at all    Are you worried that in the next 2 months, you may not have stable housing that you own, rent, or stay in as part of a household? No      Advance Care Planning  Do you have an Advance Directive, Living Will, Durable Power of , or POLST? Yes    Living Will Durable Power of    is not on file - instructed patient to bring in a copy to scan into their chart      Health Maintenance Summary            Annual Wellness Visit (Yearly) Next due on 1/21/2026 01/21/2025  Level of Service: [facility] ANNUAL WELLNESS VISIT-INCLUDES PPPS SUBSEQUE*    02/16/2024  Level of Service: [facility] ANNUAL WELLNESS VISIT-INCLUDES PPPS SUBSEQUE*    09/25/2023  Level of  Service: ANNUAL WELLNESS VISIT-INCLUDES PPPS SUBSEQUE*    09/25/2023  Visit Dx: Medicare annual wellness visit, subsequent    02/13/2023  Level of Service: [place] ANNUAL WELLNESS VISIT-INCLUDES PPPS SUBSEQUE*    Only the first 5 history entries have been loaded, but more history exists.              IMM DTaP/Tdap/Td Vaccine (2 - Td or Tdap) Next due on 3/14/2027      03/14/2017  Imm Admin: Tdap Vaccine              Colorectal Cancer Screening (Colonoscopy - Every 3 Years) Next due on 6/26/2027 06/26/2024  COLONOSCOPY RESULTS    03/29/2024  COLOGUARD COLON CANCER SCREENING    09/25/2023  OCCULT BLOOD FECES IMMUNOASSAY    01/25/2019  REFERRAL TO GI FOR COLONOSCOPY    06/22/2016  OCCULT BLOOD,FECAL,IMMUNOASSAY    Only the first 5 history entries have been loaded, but more history exists.              Pneumococcal Vaccine: 65+ Years (Series Information) Completed      10/08/2018  Imm Admin: Pneumococcal polysaccharide vaccine (PPSV-23)    09/14/2017  Imm Admin: Pneumococcal Conjugate Vaccine (Prevnar/PCV-13)              Zoster (Shingles) Vaccines (Series Information) Completed      08/13/2021  Imm Admin: Zoster Vaccine Recombinant (RZV) (SHINGRIX)    06/09/2021  Imm Admin: Zoster Vaccine Recombinant (RZV) (SHINGRIX)              Hepatitis C Screening  Completed      09/28/2021  Hepatitis C Antibody component of HEP C VIRUS ANTIBODY              Hepatitis B Vaccine (Hep B) (Series Information) Completed      09/25/2023  Imm Admin: Hepatitis B Vaccine (Adol/Adult)    03/22/2023  Imm Admin: Hepatitis B Vaccine (Adol/Adult)    10/25/2022  Imm Admin: Hepatitis B Vaccine (Adol/Adult)              Influenza Vaccine (Series Information) Completed      10/10/2024  Imm Admin: Influenza high-dose trivalent (PF)    09/25/2023  Imm Admin: Influenza Vaccine Adult HD    09/06/2022  Imm Admin: Influenza, Unspecified - HISTORICAL DATA    10/07/2021  Imm Admin: Influenza, Unspecified - HISTORICAL DATA    09/21/2020  Imm Admin:  "Influenza Vaccine Adult HD    Only the first 5 history entries have been loaded, but more history exists.              COVID-19 Vaccine (Series Information) Completed      10/10/2024  Imm Admin: COVID-19, mRNA, LNP-S, PF, joselin-sucrose, 30 mcg/0.3 mL    10/10/2023  Imm Admin: COVID-19 Vaccine, unspecified - HISTORICAL DATA    09/27/2022  Imm Admin: MODERNA BIVALENT BOOSTER SARS-COV-2 VACCINE (6+)    04/08/2022  Imm Admin: MODERNA SARS-COV-2 VACCINE (12+)    10/26/2021  Imm Admin: MODERNA SARS-COV-2 VACCINE (12+)    Only the first 5 history entries have been loaded, but more history exists.              Hepatitis A Vaccine (Hep A) (Series Information) Aged Out      No completion history exists for this topic.              HPV Vaccines (Series Information) Aged Out      No completion history exists for this topic.              Polio Vaccine (Inactivated Polio) (Series Information) Aged Out      No completion history exists for this topic.              Meningococcal Immunization (Series Information) Aged Out      No completion history exists for this topic.                    Patient Care Team:  Nash Hernandez M.D. as PCP - General (Family Medicine)  [employer] (DME Supplier)      Financial Resource Strain: Low Risk  (1/21/2025)    Overall Financial Resource Strain (CARDIA)     Difficulty of Paying Living Expenses: Not hard at all      Transportation Needs: No Transportation Needs (1/21/2025)    PRAPARE - Transportation     Lack of Transportation (Medical): No     Lack of Transportation (Non-Medical): No      Food Insecurity: No Food Insecurity (1/21/2025)    Hunger Vital Sign     Worried About Running Out of Food in the Last Year: Never true     Ran Out of Food in the Last Year: Never true        Encounter Vitals  Blood Pressure : 112/70  Pulse: (!) 52  Pulse Oximetry: 99 %  Weight: (!) 126 kg (278 lb)  Height: 193 cm (6' 4\")  BMI (Calculated): 33.84  Pain Score: No pain     Physical Exam:  Constitutional: NAD  HENMT: " NC/AT, EOMI  Cardiovascular: RRR, no m/r/g heard  Lungs: CTAB, no w/r/r  Extremities: No c/c/e  Neurologic: Alert & oriented x3, CN II-XII grossly intact    Assessment and Plan. The following treatment and monitoring plan is recommended:  Edema of both lower legs  Chronic, stable. He takes lasix 80 mg qod. Reports this has been helpful. No known heart failure or venous insufficiency. He denies SOB or PENN. Follow up with PCP at least annually for continued monitoring and management.      Obstructive sleep apnea syndrome  Chronic, stable. He does not require BiPAP nor a CPAP anymore since losing weight. He reports he sleeps good and wakes up feeling well rested. Follow up with PCP at least annually for continued monitoring and management.        Secondary hyperparathyroidism (HCC)  Chronic, stable. Secondary to CKD. He does see Dr. Zayas with Nephrology. PTH was 152 in Dec 2024. He is on vitamin d3. Follow up with nephrology as previously scheduled.    Stage 3a chronic kidney disease  Chronic, stable. Last GFR in Dec 2024 was 51. He does follow with nephrology. We discussed avoiding nephrotoxic medication such as NSAIDs as well as maintaining adequate hydration. Follow up with PCP for continued monitoring.      Services suggested: No services needed at this time  Health Care Screening: Age-appropriate preventive services recommended by USPTF and ACIP covered by Medicare were discussed today. Services ordered if indicated and agreed upon by the patient.  Referrals offered: Community-based lifestyle interventions to reduce health risks and promote self-management and wellness, fall prevention, nutrition, physical activity, tobacco-use cessation, weight loss, and mental health services as per orders if indicated.    Discussion today about general wellness and lifestyle habits:    Prevent falls and reduce trip hazards; Cautioned about securing or removing rugs.  Have a working fire alarm and carbon monoxide  detector.  Engage in regular physical activity and social activities.    Follow-up: Return for appointment with Primary Care Provider as needed..

## 2025-01-21 NOTE — ASSESSMENT & PLAN NOTE
Patient had abnormal [employer] screening done in the past. He followed that with an NAKUL which was normal. He remains asymptomatic with no prior diagnosis of PAD/PVD. Follow up with PCP at least annually for further monitoring.

## 2025-01-25 ENCOUNTER — APPOINTMENT (OUTPATIENT)
Dept: RADIOLOGY | Facility: MEDICAL CENTER | Age: 75
End: 2025-01-25
Attending: STUDENT IN AN ORGANIZED HEALTH CARE EDUCATION/TRAINING PROGRAM
Payer: MEDICARE

## 2025-01-25 ENCOUNTER — APPOINTMENT (OUTPATIENT)
Dept: RADIOLOGY | Facility: MEDICAL CENTER | Age: 75
End: 2025-01-25
Payer: MEDICARE

## 2025-01-25 ENCOUNTER — HOSPITAL ENCOUNTER (EMERGENCY)
Facility: MEDICAL CENTER | Age: 75
End: 2025-01-25
Attending: STUDENT IN AN ORGANIZED HEALTH CARE EDUCATION/TRAINING PROGRAM
Payer: MEDICARE

## 2025-01-25 VITALS
WEIGHT: 272.71 LBS | HEIGHT: 76 IN | BODY MASS INDEX: 33.21 KG/M2 | SYSTOLIC BLOOD PRESSURE: 122 MMHG | DIASTOLIC BLOOD PRESSURE: 64 MMHG | RESPIRATION RATE: 17 BRPM | HEART RATE: 58 BPM | OXYGEN SATURATION: 94 % | TEMPERATURE: 96.6 F

## 2025-01-25 DIAGNOSIS — R11.0 NAUSEA: ICD-10-CM

## 2025-01-25 DIAGNOSIS — R10.13 EPIGASTRIC ABDOMINAL PAIN: ICD-10-CM

## 2025-01-25 LAB
ALBUMIN SERPL BCP-MCNC: 4.1 G/DL (ref 3.2–4.9)
ALBUMIN/GLOB SERPL: 1.4 G/DL
ALP SERPL-CCNC: 113 U/L (ref 30–99)
ALT SERPL-CCNC: 12 U/L (ref 2–50)
ANION GAP SERPL CALC-SCNC: 12 MMOL/L (ref 7–16)
AST SERPL-CCNC: 20 U/L (ref 12–45)
BASOPHILS # BLD AUTO: 0.3 % (ref 0–1.8)
BASOPHILS # BLD: 0.04 K/UL (ref 0–0.12)
BILIRUB SERPL-MCNC: 0.6 MG/DL (ref 0.1–1.5)
BUN SERPL-MCNC: 29 MG/DL (ref 8–22)
CALCIUM ALBUM COR SERPL-MCNC: 8.9 MG/DL (ref 8.5–10.5)
CALCIUM SERPL-MCNC: 9 MG/DL (ref 8.4–10.2)
CHLORIDE SERPL-SCNC: 104 MMOL/L (ref 96–112)
CO2 SERPL-SCNC: 19 MMOL/L (ref 20–33)
CREAT SERPL-MCNC: 1.32 MG/DL (ref 0.5–1.4)
EKG IMPRESSION: NORMAL
EOSINOPHIL # BLD AUTO: 0 K/UL (ref 0–0.51)
EOSINOPHIL NFR BLD: 0 % (ref 0–6.9)
ERYTHROCYTE [DISTWIDTH] IN BLOOD BY AUTOMATED COUNT: 41.7 FL (ref 35.9–50)
GFR SERPLBLD CREATININE-BSD FMLA CKD-EPI: 57 ML/MIN/1.73 M 2
GLOBULIN SER CALC-MCNC: 3 G/DL (ref 1.9–3.5)
GLUCOSE SERPL-MCNC: 168 MG/DL (ref 65–99)
HCT VFR BLD AUTO: 46 % (ref 42–52)
HGB BLD-MCNC: 15.2 G/DL (ref 14–18)
IMM GRANULOCYTES # BLD AUTO: 0.08 K/UL (ref 0–0.11)
IMM GRANULOCYTES NFR BLD AUTO: 0.6 % (ref 0–0.9)
LIPASE SERPL-CCNC: 112 U/L (ref 11–82)
LYMPHOCYTES # BLD AUTO: 0.25 K/UL (ref 1–4.8)
LYMPHOCYTES NFR BLD: 1.9 % (ref 22–41)
MCH RBC QN AUTO: 28.7 PG (ref 27–33)
MCHC RBC AUTO-ENTMCNC: 33 G/DL (ref 32.3–36.5)
MCV RBC AUTO: 87 FL (ref 81.4–97.8)
MONOCYTES # BLD AUTO: 0.39 K/UL (ref 0–0.85)
MONOCYTES NFR BLD AUTO: 2.9 % (ref 0–13.4)
NEUTROPHILS # BLD AUTO: 12.47 K/UL (ref 1.82–7.42)
NEUTROPHILS NFR BLD: 94.3 % (ref 44–72)
NRBC # BLD AUTO: 0 K/UL
NRBC BLD-RTO: 0 /100 WBC (ref 0–0.2)
PLATELET # BLD AUTO: 300 K/UL (ref 164–446)
PMV BLD AUTO: 10.2 FL (ref 9–12.9)
POTASSIUM SERPL-SCNC: 4.7 MMOL/L (ref 3.6–5.5)
PROT SERPL-MCNC: 7.1 G/DL (ref 6–8.2)
RBC # BLD AUTO: 5.29 M/UL (ref 4.7–6.1)
SODIUM SERPL-SCNC: 135 MMOL/L (ref 135–145)
TROPONIN T SERPL-MCNC: 18 NG/L (ref 6–19)
WBC # BLD AUTO: 13.2 K/UL (ref 4.8–10.8)

## 2025-01-25 PROCEDURE — 84484 ASSAY OF TROPONIN QUANT: CPT

## 2025-01-25 PROCEDURE — 36415 COLL VENOUS BLD VENIPUNCTURE: CPT

## 2025-01-25 PROCEDURE — 83690 ASSAY OF LIPASE: CPT

## 2025-01-25 PROCEDURE — 99284 EMERGENCY DEPT VISIT MOD MDM: CPT

## 2025-01-25 PROCEDURE — 71045 X-RAY EXAM CHEST 1 VIEW: CPT

## 2025-01-25 PROCEDURE — 85025 COMPLETE CBC W/AUTO DIFF WBC: CPT

## 2025-01-25 PROCEDURE — 93005 ELECTROCARDIOGRAM TRACING: CPT | Mod: TC

## 2025-01-25 PROCEDURE — 93005 ELECTROCARDIOGRAM TRACING: CPT | Mod: TC | Performed by: STUDENT IN AN ORGANIZED HEALTH CARE EDUCATION/TRAINING PROGRAM

## 2025-01-25 PROCEDURE — 80053 COMPREHEN METABOLIC PANEL: CPT

## 2025-01-25 PROCEDURE — 700111 HCHG RX REV CODE 636 W/ 250 OVERRIDE (IP): Performed by: STUDENT IN AN ORGANIZED HEALTH CARE EDUCATION/TRAINING PROGRAM

## 2025-01-25 RX ORDER — ONDANSETRON 4 MG/1
4 TABLET, ORALLY DISINTEGRATING ORAL ONCE
Status: COMPLETED | OUTPATIENT
Start: 2025-01-25 | End: 2025-01-25

## 2025-01-25 RX ORDER — ONDANSETRON 4 MG/1
4 TABLET, ORALLY DISINTEGRATING ORAL EVERY 6 HOURS PRN
Qty: 10 TABLET | Refills: 0 | Status: SHIPPED | OUTPATIENT
Start: 2025-01-25

## 2025-01-25 RX ADMIN — ONDANSETRON 4 MG: 4 TABLET, ORALLY DISINTEGRATING ORAL at 18:22

## 2025-01-25 ASSESSMENT — FIBROSIS 4 INDEX: FIB4 SCORE: 1.12

## 2025-01-26 NOTE — ED NOTES
Pt. Verbalizes understanding of discharge instructions. accompanied to lobby with spouse. Pt. Alert/awake in NAD.  All questions answered and understood. Advised to ff-up with PCP. Medication teaching done.

## 2025-01-26 NOTE — ED PROVIDER NOTES
CHIEF COMPLAINT  Chief Complaint   Patient presents with    Epigastric Pain     With N/V and one episode of diarrhea. Reports relief after burping. Reports hx of gastric bypass approx 20yr ago.        LIMITATION TO HISTORY   Select: None    HPI    Jhony Rodriguez is a 74 y.o. male who presents to the Emergency Department presented for evaluation of nausea vomiting with associated epigastric fullness as well as diarrhea, which started today.  Patient stated that he thinks he ate some bad spaghetti and shortly thereafter he became nauseous, had a fullness sensation in his epigastrium, did have 2 separate episodes of nonbloody nonbilious of vomiting and an episode of diarrhea.  Last episode of vomiting occurred just prior to arrival in the emergency department he stated after vomiting he felt near complete relief of his symptoms.  No current pain, nausea, chest pain shortness of breath, fevers, or other complaints.    OUTSIDE HISTORIAN(S):  Select: None    EXTERNAL RECORDS REVIEWED  Select: Other office visit 1/21/2025, patient does have a history of stage III chronic kidney disease      PAST MEDICAL HISTORY  Past Medical History:   Diagnosis Date    Amnesia, global, transient- 2008, resolved; no recurrence; possibly due to due to medication interaction- celebrex and mobic   01/19/2017    IMO load March 2020    Arthritis     osteo knees, hips    Atrial fibrillation ([facility]) 2007    resolved with Ablation; [facility]    Bilateral leg edema 03/12/2020    BMI 40.0-44.9, adult ([facility]) 02/08/2022    Dyslipidemia     H/O cardiac radiofrequency ablation 2006    Hip pain, right     History of atrial fibrillation 05/20/2020    History of obstructive sleep apnea- resolved with weight loss 06/20/2016    Hypertension     Obesity     Obesity (BMI 30-39.9) 02/13/2018    Peripheral vascular disease, unspecified ([facility]) 02/13/2023    QuantaFloTM Results Interpretation    1.40 - 1.00    0.99 - 0.90    0.89 - 0.60    0.59 - 0.30     0.29 - 0.00      Normal    Borderline    Abnormal    Significant    Severe      Correspondence to % Disease    Normal    < 30%    30% - 50 %    51% - 75%    > 75%      Reported as Hemodynamically Significant by Duplex Scan    -    No    Not Usually    Often    Usually     Potential Recommendations*       Primary osteoarthritis of right hip- THR tbd 5/2020-n dr barraza 05/23/2019    Prophylactic antibiotic- for dental work due to TKRs 09/14/2017    S/P total knee arthroplasty, bilateral 04/13/2020    Sleep apnea     BIPAP    Snoring     has had sleep study no cpap     .    SURGICAL HISTORY  Past Surgical History:   Procedure Laterality Date    TURP-VAPOR  01/15/2024    CO TOTAL HIP ARTHROPLASTY Right 05/20/2020    Procedure: ARTHROPLASTY, HIP, TOTAL;  Surgeon: Connor Fonseca M.D.;  Location: SURGERY [facility];  Service: Orthopedics    KNEE ARTHROPLASTY TOTAL Left 05/05/2015    Procedure: KNEE ARTHROPLASTY TOTAL ;  Surgeon: Mike Michele M.D.;  Location: SURGERY [facility];  Service:     KNEE ARTHROPLASTY TOTAL  01/06/2015    Performed by Mike Michele M.D. at SURGERY [facility]    KNEE ARTHROPLASTY TOTAL Bilateral 2015    4 months apart    INGUINAL HERNIA REPAIR  08/16/2013    Performed by Martin Dillon M.D. at SURGERY [facility]    LAPAROTOMY  2006    bowel perforation repair, following gastric  bypass    KNEE ARTHROSCOPY Left 2006    left knee meniscectomy; dr Michele    ORIF, WRIST Right 2005    GASTRIC BYPASS LAPAROSCOPIC  2004    dr. gallo    ARTHROSCOPY, KNEE           FAMILY HISTORY  Family History   Problem Relation Age of Onset    Thyroid Mother     Heart Disease Mother     Heart Disease Brother     Cancer Maternal Aunt     Cancer Maternal Uncle     Cancer Maternal Grandfather     Sleep Apnea Neg Hx     Kidney Disease Neg Hx           SOCIAL HISTORY  Social History     Socioeconomic History    Marital status:      Spouse name: Not on file    Number of children:  Not on file    Years of education: Not on file    Highest education level: Bachelor's degree (e.g., BA, AB, BS)   Occupational History    Not on file   Tobacco Use    Smoking status: Never    Smokeless tobacco: Never   Vaping Use    Vaping status: Never Used   Substance and Sexual Activity    Alcohol use: Yes     Alcohol/week: 1.2 oz     Types: 1 Glasses of wine, 1 Cans of beer per week     Comment: one per month    Drug use: No    Sexual activity: Not Currently     Partners: Female   Other Topics Concern    Not on file   Social History Narrative    Not on file     Social Drivers of Health     Financial Resource Strain: Low Risk  (1/21/2025)    Overall Financial Resource Strain (CARDIA)     Difficulty of Paying Living Expenses: Not hard at all   Food Insecurity: No Food Insecurity (1/21/2025)    Hunger Vital Sign     Worried About Running Out of Food in the Last Year: Never true     Ran Out of Food in the Last Year: Never true   Transportation Needs: No Transportation Needs (1/21/2025)    PRAPARE - Transportation     Lack of Transportation (Medical): No     Lack of Transportation (Non-Medical): No   Physical Activity: Insufficiently Active (9/22/2023)    Exercise Vital Sign     Days of Exercise per Week: 2 days     Minutes of Exercise per Session: 60 min   Stress: No Stress Concern Present (9/22/2023)    [employer] [place] [employer] - Occupational Stress Questionnaire     Feeling of Stress : Only a little   Social Connections: Unknown (9/22/2023)    Social Connection and Isolation Panel [NHANES]     Frequency of Communication with Friends and Family: More than three times a week     Frequency of Social Gatherings with Friends and Family: More than three times a week     Attends [employer] Services: Patient declined     Active Member of Clubs or Organizations: No     Attends Club or Organization Meetings: Patient declined     Marital Status:    Intimate Partner Violence: Not on file   Housing  "Stability: Low Risk  (1/21/2025)    Housing Stability Vital Sign     Unable to Pay for Housing in the Last Year: No     Number of Times Moved in the Last Year: 1     Homeless in the Last Year: No         CURRENT MEDICATIONS  No current facility-administered medications on file prior to encounter.     Current Outpatient Medications on File Prior to Encounter   Medication Sig Dispense Refill    clobetasol (TEMOVATE) 0.05 % Cream Apply 1 Application topically 2 times a day. 45 g 2    furosemide (LASIX) 80 MG Tab Take 1 Tablet by mouth every day. 100 Tablet 1    Misc. Devices Misc CPAP machine by DME company of choice.  Same parameters as previously.  Patient will have to consult with the DME company to let them know what primaries he was on.  Because I have no idea.  Otherwise he will have to go back to pulmonary to get a new prescription. (Patient not taking: Reported on 1/21/2025) 1 Each 1    Cholecalciferol (DIALYVITE VITAMIN D 5000 PO) Take 5,000 Units by mouth every day.      Cyanocobalamin (B-12) 1000 MCG Tab CR Take 1 Tablet by mouth see administration instructions. 2 times a week 100 Tablet 3           ALLERGIES  Allergies   Allergen Reactions    Meloxicam      alterred mentation    Penicillins Rash and Swelling     Rash  Tolerated cefazolin 5/5/15       PHYSICAL EXAM  VITAL SIGNS:/64   Pulse (!) 58   Temp 35.9 °C (96.6 °F) (Temporal)   Resp 17   Ht 1.93 m (6' 4\")   Wt 124 kg (272 lb 11.3 oz)   SpO2 94%   BMI 33.20 kg/m²       VITALS - vital signs documented prior to this note have been reviewed and noted,  GENERAL - awake, alert, oriented, GCS 15, no apparent distress, non-toxic  appearing  HEENT - normocephalic, atraumatic, pupils equal, sclera anicteric, mucus  membranes moist  NECK - supple, no meningismus, full active range of motion, trachea midline  CARDIOVASCULAR - regular rate/rhythm, no murmurs/gallops/rubs  PULMONARY - no respiratory distress, speaking in full sentences, clear " to  auscultation bilaterally, no wheezing/ronchi/rales, no accessory muscle use  GASTROINTESTINAL -bowel sounds present normal active negative Mas sign no McBurney's point tenderness no rebound guarding or peritonitis no CVA tenderness no palpable abdominal masses no overlying abrasions lesions or contusions  GENITOURINARY - Deferred  NEUROLOGIC - Awake alert, normal mental status, speech fluid, cognition  normal, moves all extremities  MUSCULOSKELETAL - no obvious asymmetry or deformities present  EXTREMITIES - warm, well-perfused, no cyanosis or significant edema  DERMATOLOGIC - warm, dry, no rashes, no jaundice  PSYCHIATRIC - normal affect, normal insight, normal concentration    DIAGNOSTIC STUDIES / PROCEDURES  EKG  I have independently interpreted this EKG  Interpreted below by myself as EKG shows a sinus bradycardia rate 59 with a PAC as well as left bundle branch block, no Sgarbossa appears unchanged when compared to prior       Report   Date Value Ref Range Status   2025       [facility] Emergency Dept.    Test Date:  2025  Pt Name:    YOSSI MONTES               Department: [facility]  MRN:        [id number]                      Room:  Gender:     Male                         Technician: [id number]  :        1950                   Requested By:ER TRIAGE PROTOCOL  Order #:    [id number]                    Reading MD: Conrad Hoyt    Measurements  Intervals                                Axis  Rate:       59                           P:          47  KS:         188                          QRS:        -15  QRSD:       129                          T:          78  QT:         441  QTc:        437    Interpretive Statements  Sinus rhythm  Atrial premature complex  Left bundle branch block  Compared to ECG 2024 12:39:46  Atrial premature complex(es) now present  Sinus bradycardia no longer present  Electronically Signed On 2025 18:16:57 PST by Conrad  DiPrinzio                LABS  Labs Reviewed   CBC WITH DIFFERENTIAL - Abnormal; Notable for the following components:       Result Value    WBC 13.2 (*)     Neutrophils-Polys 94.30 (*)     Lymphocytes 1.90 (*)     Neutrophils (Absolute) 12.47 (*)     Lymphs (Absolute) 0.25 (*)     All other components within normal limits   COMP METABOLIC PANEL - Abnormal; Notable for the following components:    Co2 19 (*)     Glucose 168 (*)     Bun 29 (*)     Alkaline Phosphatase 113 (*)     All other components within normal limits   LIPASE - Abnormal; Notable for the following components:    Lipase 112 (*)     All other components within normal limits   ESTIMATED GFR - Abnormal; Notable for the following components:    GFR (CKD-EPI) 57 (*)     All other components within normal limits   TROPONIN   URINALYSIS       Patient does have a leukocytosis, no other SIRS criteria may be demargination from his vomiting.  Lipase is elevated, though no left upper quadrant abdominal pain, is not  greater than 3 times the upper limit of normal, lowering concern for pancreatitis, may be due to the patient's nausea vomiting.  Troponin negative EKG nonischemic doubt atypical ACS as a cause of his vomiting and epigastric discomfort    RADIOLOGY  I have independently interpreted the diagnostic imaging associated with this visit and am waiting the final reading from the radiologist.   My preliminary interpretation is as follows: Chest x-ray shows no acute cardiopulmonary process      Radiologist interpretation:   DX-CHEST-PORTABLE (1 VIEW)   Final Result         No acute cardiac or pulmonary abnormality is identified.           COURSE & MEDICAL DECISION MAKING    ED COURSE:        INTERVENTIONS BY ME:  Medications   ondansetron (Zofran ODT) dispertab 4 mg (4 mg Oral Given 1/25/25 1822)       Response on recheck: Reevaluation at 1900 patient is resting comfortably no acute distress his repeat abdominal exam does not demonstrate any tenderness, and  he is tolerating p.o..          INITIAL ASSESSMENT, COURSE AND PLAN  Care Narrative: Patient presented for evaluation of epigastric abdominal discomfort as well as vomiting and diarrhea which occurred prior to arrival.  Differential include was not limited to dehydration electrolyte abnormality foodborne illness viral illness cholecystitis choledocholithiasis, pancreatitis atypical ACS among many other considerations.  Patient had an episode of vomiting just prior to coming into the ER and stated that his symptoms completely resolved.  On my examination he has no tenderness, and no ongoing discomfort.  Labs were obtained which did show a leukocytosis though he has no other SIRS criteria I believe this may be a demargination from his acute vomiting.  Lipase is also elevated at 112 though no left upper quadrant pain, suspect may be due to his vomiting.  He was given a dose of Zofran and p.o. trial was able to tolerate p.o. and at this point given that his symptoms have resolved and he has no ongoing vomiting, will defer CT abdomen pelvis as overall clinical suspicion for small bowel obstruction perforated viscus intra-abdominal abscess cholecystitis or other acute abdominal pathology is low at this time.  Suspect his symptoms may have been due to a foodborne illness.  Return precautions discussed including returning for an abdominal recheck should his symptoms return, or any he develops any other new or concerning symptoms he is discharged in stable condition.             ADDITIONAL PROBLEM LIST    DISPOSITION AND DISCUSSIONS    Escalation of care considered, and ultimately not performed:diagnostic imaging    Decision tools and prescription drugs considered including, but not limited to: Zofran    FINAL DIAGNOSIS  1. Epigastric abdominal pain    2. Nausea             Electronically signed by: Conrad Hoyt DO ,7:19 PM 01/25/25

## 2025-01-26 NOTE — DISCHARGE INSTRUCTIONS
If you develop any fevers return of your pain return for recheck use Zofran as needed for nausea vomiting return with other concerns.  Uncontrolled vomiting

## 2025-01-26 NOTE — ED TRIAGE NOTES
"Chief Complaint   Patient presents with    Epigastric Pain     With N/V and one episode of diarrhea. Reports relief after burping. Reports hx of gastric bypass approx 20yr ago.      Physical Exam  Pulmonary:      Effort: Pulmonary effort is normal.   Skin:     General: Skin is warm and dry.   Neurological:      Mental Status: He is alert.       BP (!) 153/70   Pulse 65   Temp 35.9 °C (96.6 °F) (Temporal)   Resp 18   Ht 1.93 m (6' 4\")   Wt 124 kg (272 lb 11.3 oz)   SpO2 92%   BMI 33.20 kg/m²     "

## (undated) DEVICE — SODIUM CHL IRRIGATION 0.9% 1000ML (12EA/CA)

## (undated) DEVICE — Device

## (undated) DEVICE — GLOVE SURGICAL PROTEXIS PI 8.0 LF - (50PR/BX)

## (undated) DEVICE — ELECTRODE 850 FOAM ADHESIVE - HYDROGEL RADIOTRNSPRNT (50/PK)

## (undated) DEVICE — SODIUM CHL. IRRIGATION 0.9% 3000ML (4EA/CA 65CA/PF)

## (undated) DEVICE — BAG SPONGE COUNT 10.25 X 32 - BLUE (250/CA)

## (undated) DEVICE — HEAD HOLDER JUNIOR/ADULT

## (undated) DEVICE — CHLORAPREP 26 ML APPLICATOR - ORANGE TINT(25/CA)

## (undated) DEVICE — NEPTUNE 4 PORT MANIFOLD - (20/PK)

## (undated) DEVICE — DRESSING AQUACEL AG ADVANTAGE 3.5 X 10" (10EA/BX)"

## (undated) DEVICE — PACK TOTAL HIP - (1/CA)

## (undated) DEVICE — SUCTION INSTRUMENT YANKAUER BULBOUS TIP W/O VENT (50EA/CA)

## (undated) DEVICE — HUMID-VENT HEAT AND MOISTURE EXCHANGE- (50/BX)

## (undated) DEVICE — TUBE CONNECTING SUCTION - CLEAR PLASTIC STERILE 72 IN (50EA/CA)

## (undated) DEVICE — GLOVE BIOGEL PI INDICATOR SZ 7.5 SURGICAL PF LF -(50/BX 4BX/CA)

## (undated) DEVICE — STOCKINET TUBULAR 6IN STERILE - 6 X 48YDS (25/CA)

## (undated) DEVICE — KIT ANESTHESIA W/CIRCUIT & 3/LT BAG W/FILTER (20EA/CA)

## (undated) DEVICE — SENSOR SPO2 NEO LNCS ADHESIVE (20/BX) SEE USER NOTES

## (undated) DEVICE — LACTATED RINGERS INJ 1000 ML - (14EA/CA 60CA/PF)

## (undated) DEVICE — PILLOW ABDUCTION HIP LARGE - (6EA/CA)

## (undated) DEVICE — WATER IRRIGATION STERILE 1000ML (12EA/CA)

## (undated) DEVICE — ELECTRODE DUAL RETURN W/ CORD - (50/PK)

## (undated) DEVICE — PILLOW ABDUCTION HIP SMALL - (6EA/CA)

## (undated) DEVICE — PROTECTOR ULNA NERVE - (36PR/CA)

## (undated) DEVICE — SUTURE 2-0 VICRYL PLUS CT-1 36 (36PK/BX)"

## (undated) DEVICE — BLADE SAGITTAL SAW DUAL CUT 75.0 X 25.0MM (1/EA)

## (undated) DEVICE — GLOVE BIOGEL PI ULTRATOUCH SZ 8.5 SURGICAL PF LF - (200PR/CA)

## (undated) DEVICE — GLOVE, LITE (PAIR)

## (undated) DEVICE — GOWN WARMING STANDARD FLEX - (30/CA)

## (undated) DEVICE — MASK ANESTHESIA ADULT  - (100/CA)

## (undated) DEVICE — CANISTER SUCTION RIGID RED 1500CC (40EA/CA)

## (undated) DEVICE — TUBE E-T HI-LO CUFF 8.5MM (10EA/PK)

## (undated) DEVICE — GLOVE BIOGEL INDICATOR SZ 8.5 SURGICAL PF LTX - (50/BX 4BX/CA)

## (undated) DEVICE — STAPLER SKIN DISP - (6/BX 10BX/CA) VISISTAT

## (undated) DEVICE — LENS/HOOD FOR SPACESUIT - (32/PK) PEEL AWAY FACE

## (undated) DEVICE — HANDPIECE 10FT INTPLS SCT PLS IRRIGATION HAND CONTROL SET (6/PK)

## (undated) DEVICE — TIP INTPLS HFLO ML ORFC BTRY - (12/CS)  FOR SURGILAV